# Patient Record
Sex: MALE | Race: WHITE | Employment: FULL TIME | URBAN - METROPOLITAN AREA
[De-identification: names, ages, dates, MRNs, and addresses within clinical notes are randomized per-mention and may not be internally consistent; named-entity substitution may affect disease eponyms.]

---

## 2018-01-18 ENCOUNTER — APPOINTMENT (OUTPATIENT)
Dept: CT IMAGING | Age: 68
DRG: 392 | End: 2018-01-18
Attending: PHYSICIAN ASSISTANT
Payer: MEDICARE

## 2018-01-18 ENCOUNTER — HOSPITAL ENCOUNTER (INPATIENT)
Age: 68
LOS: 4 days | Discharge: HOME | DRG: 392 | End: 2018-01-22
Attending: SURGERY | Admitting: SURGERY
Payer: MEDICARE

## 2018-01-18 DIAGNOSIS — K57.92 ACUTE DIVERTICULITIS: Primary | ICD-10-CM

## 2018-01-18 LAB
ANION GAP SERPL CALC-SCNC: 7 MMOL/L (ref 5–15)
BASOPHILS # BLD: 0 K/UL (ref 0–0.1)
BASOPHILS NFR BLD: 0 % (ref 0–1)
BUN SERPL-MCNC: 15 MG/DL (ref 6–20)
BUN/CREAT SERPL: 17 (ref 12–20)
CALCIUM SERPL-MCNC: 9.7 MG/DL (ref 8.5–10.1)
CHLORIDE SERPL-SCNC: 100 MMOL/L (ref 97–108)
CO2 SERPL-SCNC: 32 MMOL/L (ref 21–32)
CREAT SERPL-MCNC: 0.89 MG/DL (ref 0.7–1.3)
EOSINOPHIL # BLD: 0.1 K/UL (ref 0–0.4)
EOSINOPHIL NFR BLD: 1 % (ref 0–7)
ERYTHROCYTE [DISTWIDTH] IN BLOOD BY AUTOMATED COUNT: 13.1 % (ref 11.5–14.5)
GLUCOSE SERPL-MCNC: 79 MG/DL (ref 65–100)
HCT VFR BLD AUTO: 44.2 % (ref 36.6–50.3)
HGB BLD-MCNC: 15.4 G/DL (ref 12.1–17)
LYMPHOCYTES # BLD: 1.6 K/UL (ref 0.8–3.5)
LYMPHOCYTES NFR BLD: 17 % (ref 12–49)
MAGNESIUM SERPL-MCNC: 2.1 MG/DL (ref 1.6–2.4)
MCH RBC QN AUTO: 30.7 PG (ref 26–34)
MCHC RBC AUTO-ENTMCNC: 34.8 G/DL (ref 30–36.5)
MCV RBC AUTO: 88.2 FL (ref 80–99)
MONOCYTES # BLD: 0.9 K/UL (ref 0–1)
MONOCYTES NFR BLD: 10 % (ref 5–13)
NEUTS SEG # BLD: 6.7 K/UL (ref 1.8–8)
NEUTS SEG NFR BLD: 72 % (ref 32–75)
PLATELET # BLD AUTO: 263 K/UL (ref 150–400)
POTASSIUM SERPL-SCNC: 4 MMOL/L (ref 3.5–5.1)
RBC # BLD AUTO: 5.01 M/UL (ref 4.1–5.7)
SODIUM SERPL-SCNC: 139 MMOL/L (ref 136–145)
WBC # BLD AUTO: 9.3 K/UL (ref 4.1–11.1)

## 2018-01-18 PROCEDURE — 85025 COMPLETE CBC W/AUTO DIFF WBC: CPT | Performed by: PHYSICIAN ASSISTANT

## 2018-01-18 PROCEDURE — 74011250637 HC RX REV CODE- 250/637: Performed by: PHYSICIAN ASSISTANT

## 2018-01-18 PROCEDURE — 74177 CT ABD & PELVIS W/CONTRAST: CPT

## 2018-01-18 PROCEDURE — 74011636320 HC RX REV CODE- 636/320: Performed by: RADIOLOGY

## 2018-01-18 PROCEDURE — 65270000029 HC RM PRIVATE

## 2018-01-18 PROCEDURE — 74011000258 HC RX REV CODE- 258: Performed by: SURGERY

## 2018-01-18 PROCEDURE — 83735 ASSAY OF MAGNESIUM: CPT | Performed by: PHYSICIAN ASSISTANT

## 2018-01-18 PROCEDURE — 74011250636 HC RX REV CODE- 250/636: Performed by: SURGERY

## 2018-01-18 PROCEDURE — 36415 COLL VENOUS BLD VENIPUNCTURE: CPT | Performed by: PHYSICIAN ASSISTANT

## 2018-01-18 PROCEDURE — 74011250636 HC RX REV CODE- 250/636: Performed by: PHYSICIAN ASSISTANT

## 2018-01-18 PROCEDURE — 80048 BASIC METABOLIC PNL TOTAL CA: CPT | Performed by: PHYSICIAN ASSISTANT

## 2018-01-18 RX ORDER — ENOXAPARIN SODIUM 100 MG/ML
40 INJECTION SUBCUTANEOUS EVERY 24 HOURS
Status: DISCONTINUED | OUTPATIENT
Start: 2018-01-18 | End: 2018-01-22 | Stop reason: HOSPADM

## 2018-01-18 RX ORDER — SODIUM CHLORIDE 0.9 % (FLUSH) 0.9 %
5-10 SYRINGE (ML) INJECTION AS NEEDED
Status: DISCONTINUED | OUTPATIENT
Start: 2018-01-18 | End: 2018-01-22 | Stop reason: HOSPADM

## 2018-01-18 RX ORDER — HYDROMORPHONE HYDROCHLORIDE 2 MG/ML
0.5 INJECTION, SOLUTION INTRAMUSCULAR; INTRAVENOUS; SUBCUTANEOUS
Status: DISCONTINUED | OUTPATIENT
Start: 2018-01-18 | End: 2018-01-20

## 2018-01-18 RX ORDER — NALOXONE HYDROCHLORIDE 0.4 MG/ML
0.4 INJECTION, SOLUTION INTRAMUSCULAR; INTRAVENOUS; SUBCUTANEOUS AS NEEDED
Status: DISCONTINUED | OUTPATIENT
Start: 2018-01-18 | End: 2018-01-22 | Stop reason: HOSPADM

## 2018-01-18 RX ORDER — DIPHENHYDRAMINE HYDROCHLORIDE 50 MG/ML
12.5 INJECTION, SOLUTION INTRAMUSCULAR; INTRAVENOUS
Status: DISCONTINUED | OUTPATIENT
Start: 2018-01-18 | End: 2018-01-22 | Stop reason: HOSPADM

## 2018-01-18 RX ORDER — DEXTROSE, SODIUM CHLORIDE, AND POTASSIUM CHLORIDE 5; .45; .15 G/100ML; G/100ML; G/100ML
75 INJECTION INTRAVENOUS CONTINUOUS
Status: DISCONTINUED | OUTPATIENT
Start: 2018-01-18 | End: 2018-01-22

## 2018-01-18 RX ORDER — ONDANSETRON 2 MG/ML
4 INJECTION INTRAMUSCULAR; INTRAVENOUS
Status: DISCONTINUED | OUTPATIENT
Start: 2018-01-18 | End: 2018-01-22 | Stop reason: HOSPADM

## 2018-01-18 RX ORDER — SODIUM CHLORIDE 0.9 % (FLUSH) 0.9 %
5-10 SYRINGE (ML) INJECTION EVERY 8 HOURS
Status: DISCONTINUED | OUTPATIENT
Start: 2018-01-18 | End: 2018-01-22 | Stop reason: HOSPADM

## 2018-01-18 RX ORDER — ALPRAZOLAM 0.5 MG/1
0.5 TABLET ORAL
Status: DISCONTINUED | OUTPATIENT
Start: 2018-01-18 | End: 2018-01-22 | Stop reason: HOSPADM

## 2018-01-18 RX ADMIN — DEXTROSE MONOHYDRATE, SODIUM CHLORIDE, AND POTASSIUM CHLORIDE 125 ML/HR: 50; 4.5; 1.49 INJECTION, SOLUTION INTRAVENOUS at 12:31

## 2018-01-18 RX ADMIN — HYDROMORPHONE HYDROCHLORIDE 0.5 MG: 2 INJECTION INTRAMUSCULAR; INTRAVENOUS; SUBCUTANEOUS at 18:35

## 2018-01-18 RX ADMIN — Medication 10 ML: at 12:31

## 2018-01-18 RX ADMIN — PIPERACILLIN SODIUM AND TAZOBACTAM SODIUM 3.38 G: 3; .375 INJECTION, POWDER, LYOPHILIZED, FOR SOLUTION INTRAVENOUS at 12:30

## 2018-01-18 RX ADMIN — HYDROMORPHONE HYDROCHLORIDE 0.5 MG: 2 INJECTION INTRAMUSCULAR; INTRAVENOUS; SUBCUTANEOUS at 15:28

## 2018-01-18 RX ADMIN — HYDROMORPHONE HYDROCHLORIDE 0.5 MG: 2 INJECTION INTRAMUSCULAR; INTRAVENOUS; SUBCUTANEOUS at 21:06

## 2018-01-18 RX ADMIN — ALPRAZOLAM 0.5 MG: 0.5 TABLET ORAL at 21:06

## 2018-01-18 RX ADMIN — IOPAMIDOL 94 ML: 755 INJECTION, SOLUTION INTRAVENOUS at 14:10

## 2018-01-18 RX ADMIN — PIPERACILLIN SODIUM AND TAZOBACTAM SODIUM 3.38 G: 3; .375 INJECTION, POWDER, LYOPHILIZED, FOR SOLUTION INTRAVENOUS at 21:05

## 2018-01-18 RX ADMIN — HYDROMORPHONE HYDROCHLORIDE 0.5 MG: 2 INJECTION INTRAMUSCULAR; INTRAVENOUS; SUBCUTANEOUS at 12:31

## 2018-01-18 RX ADMIN — ENOXAPARIN SODIUM 40 MG: 40 INJECTION SUBCUTANEOUS at 16:27

## 2018-01-18 NOTE — IP AVS SNAPSHOT
[address] 1007 [facility] 
[phone] Patient: Luis M Hutchinson MRN: [id number] PEREZ:[id number] About your hospitalization You were admitted on:  January 18, 2018 You last received care in the:  [facility]  MED SURG 2 You were discharged on:  January 22, 2018 Why you were hospitalized Your primary diagnosis was:  Not on File Your diagnoses also included:  Acute Diverticulitis Follow-up Information Follow up With Details Comments Contact Info Ahsan Orozco MD Schedule an appointment as soon as possible for a visit  [address] 1007 [facility] 
[phone] Marry Isaacs MD Schedule an appointment as soon as possible for a visit in 4 weeks to discuss colonoscopy  [facility] [place] [address] [facility] [address] 
[phone] Carolynn Borja MD Go on 1/26/2018 Hospital follow-up appointment at 1:00PM [address] 
[address] 1007 [facility] 
[phone] Discharge Orders None A check keyon indicates which time of day the medication should be taken. My Medications START taking these medications Instructions Each Dose to Equal  
 Morning Noon Evening Bedtime  
 levoFLOXacin 750 mg tablet Commonly known as:  Kirstie Hamilton Your last dose was: Your next dose is: Take 1 Tab by mouth daily. 750 mg  
    
   
   
   
  
 metroNIDAZOLE 500 mg tablet Commonly known as:  FLAGYL Your last dose was: Your next dose is: Take 1 Tab by mouth three (3) times daily. 500 mg  
    
   
   
   
  
 traMADol 50 mg tablet Commonly known as:  ULTRAM  
   
Your last dose was: Your next dose is: Take 1 Tab by mouth every six (6) hours as needed. Max Daily Amount: 200 mg. Indications: Pain 50 mg CONTINUE taking these medications Instructions Each Dose to Equal  
 Morning Noon Evening Bedtime ALPRAZolam 0.5 mg tablet Commonly known as:  Claudia Carcamo Your last dose was: Your next dose is: Take 1 Tab by mouth every eight (8) hours as needed for Anxiety. Max Daily Amount: 1.5 mg.  
 0.5 mg  
    
   
   
   
  
  
STOP taking these medications HYDROcodone-acetaminophen 5-325 mg per tablet Commonly known as:  Debbie Apa Where to Get Your Medications Information on where to get these meds will be given to you by the nurse or doctor. ! Ask your nurse or doctor about these medications  
  levoFLOXacin 750 mg tablet  
 metroNIDAZOLE 500 mg tablet  
 traMADol 50 mg tablet Discharge Instructions Patient Discharge Instructions Moise Rosales / [id number] : 1950 Admitted 2018 Discharged: 2018 Take Home Medications · It is important that you take the medication exactly as they are prescribed. · Keep your medication in the bottles provided by the pharmacist and keep a list of the medication names, dosages, and times to be taken in your wallet. · Do not take other medications without consulting your doctor. · Do not drive, drink alcohol, or operate machinery when taking sedating pain medication. · Take colace daily while on pain medication to help prevent constipation. You may take over the counter laxatives such as Dulcolax or Miralax as needed for constipation What to do at [facility] Recommended diet: Low residue diet Recommended activity: As tolerated Follow up with Dr. Sumanth Concepcion in 2 weeks. Call [facility] to make an appointment. Follow up with Dr. Jae Bell (gastroenterology) for colonoscopy. Call Dr. Sumanth Concepcion or go to the ER if you develop worsening pain, fever, vomiting, or any other symptoms that concern you. Unresulted Labs-Please follow up with your PCP about these lab tests Order Current Status MAGNESIUM Collected (01/22/18 0400) Providers Seen During Your Hospitalization Provider Specialty Primary office phone Yan Sheffield MD General Surgery [phone] Your Primary Care Physician (PCP) Primary Care Physician Office Phone Office Fax Alicia Walls, [address]oni Clay [facility] [phone] You are allergic to the following No active allergies Recent Documentation Height Weight BMI Smoking Status 1.753 m 66.7 kg 21.71 kg/m2 Never Smoker Emergency Contacts Name Discharge Info Relation Home Work Mobile Nick Ramirez DISCHARGE CAREGIVER [3] Spouse [3] [phone] Patient Belongings The following personal items are in your possession at time of discharge: 
  Dental Appliances: None         Home Medications: None   Jewelry: With patient  Clothing: At bedside    Other Valuables: Cell Phone, At bedside Please provide this summary of care documentation to your next provider. Signatures-by signing, you are acknowledging that this After Visit Summary has been reviewed with you and you have received a copy. Patient Signature:  ____________________________________________________________ Date:  ____________________________________________________________  
  
Mamta Early Provider Signature:  ____________________________________________________________ Date:  ____________________________________________________________

## 2018-01-18 NOTE — PROGRESS NOTES
Met with pt for d/c planning. He lives with his wife in [place] and works full time at [address] [place] [address] in . Pt reported that he has never had HH, and he does not own any DME. Pt has Rx coverage. Pharmacy is [facility]. No discharge needs identified. CM will follow and assist as needed. Juan Lang LCSW    Care Management Interventions  PCP Verified by CM:  Yes (Dr. Breanna Awad)  Discharge Durable Medical Equipment: No  Physical Therapy Consult: No  Occupational Therapy Consult: No  Speech Therapy Consult: No  Current Support Network: Lives with Spouse  Confirm Follow Up Transport: Family  Plan discussed with Pt/Family/Caregiver: Yes  Discharge Location  Discharge Placement: Home

## 2018-01-18 NOTE — H&P
Surgery History and Physical    Subjective: Leila Cedeno is a 79 y.o. male who was direct admitted to the hospital from our office for acute diverticulitis. Pt states he developed abdominal pain in mid-December. He was seen by his PCP and was given course of Cipro/Flagyl without improvement. He returned to his PCP earlier this week and CT was obtained showing sigmoid diverticulitis, prompting referral to Dr. Robert Yost. Pt reports increased pain and nausea with PO intake. He has had some constipation and chills. He denies fever, hematochezia or melena. Last colonoscopy 9 years ago. Past Medical History:   Diagnosis Date    Hypertension      No past surgical history on file. No family history on file.   Social History     Social History    Marital status:      Spouse name: N/A    Number of children: N/A    Years of education: N/A     Social History Main Topics    Smoking status: Never Smoker    Smokeless tobacco: Not on file    Alcohol use Not on file    Drug use: Not on file    Sexual activity: Not on file     Other Topics Concern    Not on file     Social History Narrative      Current Facility-Administered Medications   Medication Dose Route Frequency    sodium chloride (NS) flush 5-10 mL  5-10 mL IntraVENous Q8H    sodium chloride (NS) flush 5-10 mL  5-10 mL IntraVENous PRN    HYDROmorphone (DILAUDID) injection 0.5 mg  0.5 mg IntraVENous Q3H PRN    naloxone (NARCAN) injection 0.4 mg  0.4 mg IntraVENous PRN    diphenhydrAMINE (BENADRYL) injection 12.5 mg  12.5 mg IntraVENous Q4H PRN    ondansetron (ZOFRAN) injection 4 mg  4 mg IntraVENous Q4H PRN    dextrose 5% - 0.45% NaCl with KCl 20 mEq/L infusion  125 mL/hr IntraVENous CONTINUOUS    piperacillin-tazobactam (ZOSYN) 3.375 g in 0.9% sodium chloride (MBP/ADV) 100 mL  3.375 g IntraVENous Q8H    diatrizoate meglumine-d.sodium (MD-GASTROVIEW,GASTROGRAFIN) 66-10 % contrast solution 30 mL  30 mL Oral RAD ONCE      No Known Allergies    Review of Systems:REVIEW OF SYSTEMS:     []     Unable to obtain  ROS due to  []    mental status change  []    sedated   []    intubated   []    Total of 12 systems reviewed as follows:    Constitutional: neg for fevers, weight loss, malaise, + chills  Eyes: negative for blurry vision  Ears, nose, mouth, throat, and face: negative for sore throat  Respiratory: negative for SOB  Cardiovascular: negative for CP  Gastrointestinal: + for nausea and abdominal pain,  constipation, negative for vomiting, diarrhea, melena, hematochezia  Genitourinary: negative for dysuria  Integument/breast: neg for skin rash  Hematologic/lymphatic: neg for bruising  Musculoskeletal: negative for muscle aches  Neurological: no dizziness or h/a    Objective:      Patient Vitals for the past 8 hrs:   BP Temp Pulse Resp SpO2   18 1139 123/77 97.8 °F (36.6 °C) (!) 53 16 96 %       Temp (24hrs), Av.8 °F (36.6 °C), Min:97.8 °F (36.6 °C), Max:97.8 °F (36.6 °C)      Physical Exam:  General:  Alert, cooperative, no distress, appears stated age. Eyes:  Conjunctivae/corneas clear. Nose: Nares normal. Septum midline   Mouth/Throat: Lips, mucosa, and tongue normal.    Neck: Supple, symmetrical, trachea midline   Lungs:   Clear to auscultation bilaterally. Heart:  Regular rate and rhythm   Abdomen:   Soft, non-distended, suprapubic and LLQ tenderness, no peritonitis    Extremities: Extremities normal, atraumatic, no cyanosis or edema. Skin: Skin color, texture, turgor normal. No rashes or lesions   Neuro: Alert, oriented, speech clear     Labs: No results for input(s): WBC, HGB, HCT, PLT, HGBEXT, HCTEXT, PLTEXT in the last 72 hours. No results for input(s): NA, K, CL, CO2, GLU, BUN, CREA, CA, MG, PHOS, ALB, TBIL, TBILI, SGOT, ALT in the last 72 hours. No results for input(s): INR in the last 72 hours.     No lab exists for component: INREXT      Assessment and Plan:     Acute diverticulitis    NPO, IVF, Zosyn, labs, obtain CT abd/pelvis. Further plan to follow review of imaging.        Signed By: DRU Serna     January 18, 2018

## 2018-01-18 NOTE — PROGRESS NOTES
1135: Notified Olga GONSALES that pt has arrived to floor. PA stated to keep pt NPO for now. PA to place admission orders. 1231: Notified Radiology contrast is being started.

## 2018-01-18 NOTE — PROGRESS NOTES
Primary Nurse Ebenezer Rodriguez RN and Nicolas Perry RN performed a dual skin assessment on this patient. No impairment noted. Leandro score is 22.

## 2018-01-19 LAB
ANION GAP SERPL CALC-SCNC: 8 MMOL/L (ref 5–15)
BASOPHILS # BLD: 0 K/UL (ref 0–0.1)
BASOPHILS NFR BLD: 1 % (ref 0–1)
BUN SERPL-MCNC: 12 MG/DL (ref 6–20)
BUN/CREAT SERPL: 13 (ref 12–20)
CALCIUM SERPL-MCNC: 9.3 MG/DL (ref 8.5–10.1)
CHLORIDE SERPL-SCNC: 101 MMOL/L (ref 97–108)
CO2 SERPL-SCNC: 30 MMOL/L (ref 21–32)
CREAT SERPL-MCNC: 0.89 MG/DL (ref 0.7–1.3)
EOSINOPHIL # BLD: 0.2 K/UL (ref 0–0.4)
EOSINOPHIL NFR BLD: 2 % (ref 0–7)
ERYTHROCYTE [DISTWIDTH] IN BLOOD BY AUTOMATED COUNT: 13 % (ref 11.5–14.5)
GLUCOSE SERPL-MCNC: 95 MG/DL (ref 65–100)
HCT VFR BLD AUTO: 43.2 % (ref 36.6–50.3)
HGB BLD-MCNC: 14.8 G/DL (ref 12.1–17)
LYMPHOCYTES # BLD: 1.7 K/UL (ref 0.8–3.5)
LYMPHOCYTES NFR BLD: 26 % (ref 12–49)
MAGNESIUM SERPL-MCNC: 2 MG/DL (ref 1.6–2.4)
MCH RBC QN AUTO: 30.5 PG (ref 26–34)
MCHC RBC AUTO-ENTMCNC: 34.3 G/DL (ref 30–36.5)
MCV RBC AUTO: 89.1 FL (ref 80–99)
MONOCYTES # BLD: 0.8 K/UL (ref 0–1)
MONOCYTES NFR BLD: 13 % (ref 5–13)
NEUTS SEG # BLD: 3.9 K/UL (ref 1.8–8)
NEUTS SEG NFR BLD: 58 % (ref 32–75)
PLATELET # BLD AUTO: 237 K/UL (ref 150–400)
POTASSIUM SERPL-SCNC: 3.8 MMOL/L (ref 3.5–5.1)
RBC # BLD AUTO: 4.85 M/UL (ref 4.1–5.7)
SODIUM SERPL-SCNC: 139 MMOL/L (ref 136–145)
WBC # BLD AUTO: 6.6 K/UL (ref 4.1–11.1)

## 2018-01-19 PROCEDURE — 83735 ASSAY OF MAGNESIUM: CPT | Performed by: PHYSICIAN ASSISTANT

## 2018-01-19 PROCEDURE — 65270000029 HC RM PRIVATE

## 2018-01-19 PROCEDURE — 74011250636 HC RX REV CODE- 250/636: Performed by: PHYSICIAN ASSISTANT

## 2018-01-19 PROCEDURE — 74011250637 HC RX REV CODE- 250/637: Performed by: PHYSICIAN ASSISTANT

## 2018-01-19 PROCEDURE — 80048 BASIC METABOLIC PNL TOTAL CA: CPT | Performed by: PHYSICIAN ASSISTANT

## 2018-01-19 PROCEDURE — 74011000258 HC RX REV CODE- 258: Performed by: SURGERY

## 2018-01-19 PROCEDURE — 74011250636 HC RX REV CODE- 250/636: Performed by: SURGERY

## 2018-01-19 PROCEDURE — 85025 COMPLETE CBC W/AUTO DIFF WBC: CPT | Performed by: PHYSICIAN ASSISTANT

## 2018-01-19 PROCEDURE — 74011000250 HC RX REV CODE- 250: Performed by: PHYSICIAN ASSISTANT

## 2018-01-19 PROCEDURE — 36415 COLL VENOUS BLD VENIPUNCTURE: CPT | Performed by: PHYSICIAN ASSISTANT

## 2018-01-19 RX ORDER — HYDROCODONE BITARTRATE AND ACETAMINOPHEN 10; 325 MG/1; MG/1
1 TABLET ORAL
Status: DISCONTINUED | OUTPATIENT
Start: 2018-01-19 | End: 2018-01-20

## 2018-01-19 RX ORDER — LEVOFLOXACIN 750 MG/1
750 TABLET ORAL DAILY
Qty: 12 TAB | Refills: 0 | Status: ON HOLD | OUTPATIENT
Start: 2018-01-19 | End: 2018-02-23

## 2018-01-19 RX ORDER — DOCUSATE SODIUM 100 MG/1
100 CAPSULE, LIQUID FILLED ORAL DAILY
Status: DISCONTINUED | OUTPATIENT
Start: 2018-01-19 | End: 2018-01-22 | Stop reason: HOSPADM

## 2018-01-19 RX ORDER — METRONIDAZOLE 500 MG/100ML
500 INJECTION, SOLUTION INTRAVENOUS EVERY 12 HOURS
Status: DISCONTINUED | OUTPATIENT
Start: 2018-01-19 | End: 2018-01-22 | Stop reason: HOSPADM

## 2018-01-19 RX ORDER — HYDROCODONE BITARTRATE AND ACETAMINOPHEN 5; 325 MG/1; MG/1
1 TABLET ORAL
Qty: 20 TAB | Refills: 0 | Status: SHIPPED | OUTPATIENT
Start: 2018-01-19 | End: 2018-01-22

## 2018-01-19 RX ORDER — METRONIDAZOLE 500 MG/1
500 TABLET ORAL 3 TIMES DAILY
Qty: 36 TAB | Refills: 0 | Status: ON HOLD | OUTPATIENT
Start: 2018-01-19 | End: 2018-02-23

## 2018-01-19 RX ORDER — LEVOFLOXACIN 5 MG/ML
750 INJECTION, SOLUTION INTRAVENOUS DAILY
Status: DISCONTINUED | OUTPATIENT
Start: 2018-01-19 | End: 2018-01-22 | Stop reason: HOSPADM

## 2018-01-19 RX ORDER — KETOROLAC TROMETHAMINE 30 MG/ML
15 INJECTION, SOLUTION INTRAMUSCULAR; INTRAVENOUS EVERY 6 HOURS
Status: COMPLETED | OUTPATIENT
Start: 2018-01-19 | End: 2018-01-22

## 2018-01-19 RX ADMIN — METRONIDAZOLE 500 MG: 500 INJECTION, SOLUTION INTRAVENOUS at 23:09

## 2018-01-19 RX ADMIN — HYDROMORPHONE HYDROCHLORIDE 0.5 MG: 2 INJECTION INTRAMUSCULAR; INTRAVENOUS; SUBCUTANEOUS at 07:09

## 2018-01-19 RX ADMIN — Medication 10 ML: at 22:00

## 2018-01-19 RX ADMIN — PIPERACILLIN SODIUM AND TAZOBACTAM SODIUM 3.38 G: 3; .375 INJECTION, POWDER, LYOPHILIZED, FOR SOLUTION INTRAVENOUS at 04:26

## 2018-01-19 RX ADMIN — LEVOFLOXACIN 750 MG: 5 INJECTION, SOLUTION INTRAVENOUS at 11:20

## 2018-01-19 RX ADMIN — ENOXAPARIN SODIUM 40 MG: 40 INJECTION SUBCUTANEOUS at 17:01

## 2018-01-19 RX ADMIN — METRONIDAZOLE 500 MG: 500 INJECTION, SOLUTION INTRAVENOUS at 11:20

## 2018-01-19 RX ADMIN — HYDROCODONE BITARTRATE AND ACETAMINOPHEN 1 TABLET: 10; 325 TABLET ORAL at 09:18

## 2018-01-19 RX ADMIN — HYDROCODONE BITARTRATE AND ACETAMINOPHEN 1 TABLET: 10; 325 TABLET ORAL at 14:56

## 2018-01-19 RX ADMIN — ALPRAZOLAM 0.5 MG: 0.5 TABLET ORAL at 21:06

## 2018-01-19 RX ADMIN — HYDROMORPHONE HYDROCHLORIDE 0.5 MG: 2 INJECTION INTRAMUSCULAR; INTRAVENOUS; SUBCUTANEOUS at 04:21

## 2018-01-19 RX ADMIN — DOCUSATE SODIUM 100 MG: 100 CAPSULE, LIQUID FILLED ORAL at 11:21

## 2018-01-19 RX ADMIN — HYDROCODONE BITARTRATE AND ACETAMINOPHEN 1 TABLET: 10; 325 TABLET ORAL at 21:06

## 2018-01-19 RX ADMIN — KETOROLAC TROMETHAMINE 15 MG: 30 INJECTION, SOLUTION INTRAMUSCULAR at 11:20

## 2018-01-19 RX ADMIN — HYDROMORPHONE HYDROCHLORIDE 0.5 MG: 2 INJECTION INTRAMUSCULAR; INTRAVENOUS; SUBCUTANEOUS at 00:55

## 2018-01-19 RX ADMIN — KETOROLAC TROMETHAMINE 15 MG: 30 INJECTION, SOLUTION INTRAMUSCULAR at 23:09

## 2018-01-19 RX ADMIN — KETOROLAC TROMETHAMINE 15 MG: 30 INJECTION, SOLUTION INTRAMUSCULAR at 17:01

## 2018-01-19 NOTE — ROUTINE PROCESS
Bedside and Verbal shift change report given to eRmy Bishop 69 (oncoming nurse) by Olivia OLIVAS (offgoing nurse). Report included the following information SBAR, Kardex, Intake/Output and Recent Results.

## 2018-01-19 NOTE — DISCHARGE INSTRUCTIONS
Patient Discharge Instructions    Mick Ramon / [id number] : 1950    Admitted 2018 Discharged: 2018     Take Home Medications       · It is important that you take the medication exactly as they are prescribed. · Keep your medication in the bottles provided by the pharmacist and keep a list of the medication names, dosages, and times to be taken in your wallet. · Do not take other medications without consulting your doctor. · Do not drive, drink alcohol, or operate machinery when taking sedating pain medication. · Take colace daily while on pain medication to help prevent constipation. You may take over the counter laxatives such as Dulcolax or Miralax as needed for constipation      What to do at Home    Recommended diet: Low residue diet     Recommended activity: As tolerated    Follow up with Dr. Lawler in 2 weeks. Call [facility] to make an appointment. Follow up with Dr. Katharina Hidalgo (gastroenterology) for colonoscopy. Call Dr. Lawler or go to the ER if you develop worsening pain, fever, vomiting, or any other symptoms that concern you.

## 2018-01-19 NOTE — PROGRESS NOTES
General Surgery Daily Progress Note    Patient: Angela Recinos MRN: [id number]  SSN: [id number]    YOB: 1950  Age: 79 y.o. Sex: male      Admit Date: 1/18/2018    Subjective:   Pain moderately improved. Denies N/V. No fever. Current Facility-Administered Medications   Medication Dose Route Frequency    ketorolac (TORADOL) injection 15 mg  15 mg IntraVENous Q6H    HYDROcodone-acetaminophen (NORCO)  mg tablet 1 Tab  1 Tab Oral Q4H PRN    levoFLOXacin (LEVAQUIN) 750 mg in D5W IVPB  750 mg IntraVENous Q24H    metroNIDAZOLE (FLAGYL) IVPB premix 500 mg  500 mg IntraVENous Q8H    docusate sodium (COLACE) capsule 100 mg  100 mg Oral DAILY    sodium chloride (NS) flush 5-10 mL  5-10 mL IntraVENous Q8H    sodium chloride (NS) flush 5-10 mL  5-10 mL IntraVENous PRN    HYDROmorphone (DILAUDID) injection 0.5 mg  0.5 mg IntraVENous Q3H PRN    naloxone (NARCAN) injection 0.4 mg  0.4 mg IntraVENous PRN    diphenhydrAMINE (BENADRYL) injection 12.5 mg  12.5 mg IntraVENous Q4H PRN    ondansetron (ZOFRAN) injection 4 mg  4 mg IntraVENous Q4H PRN    dextrose 5% - 0.45% NaCl with KCl 20 mEq/L infusion  75 mL/hr IntraVENous CONTINUOUS    enoxaparin (LOVENOX) injection 40 mg  40 mg SubCUTAneous Q24H    ALPRAZolam (XANAX) tablet 0.5 mg  0.5 mg Oral Q8H PRN        Objective:      01/17 1901 - 01/19 0700  In: 120 [P.O.:120]  Out: -   Patient Vitals for the past 8 hrs:   BP Temp Pulse Resp SpO2   01/19/18 0721 113/77 97.9 °F (36.6 °C) (!) 59 17 99 %   01/19/18 0413 145/81 99.1 °F (37.3 °C) 63 16 96 %       Physical Exam:  General: Alert, cooperative, NAD  Lungs: Unlabored  Heart:  Regular rate and  rhythm  Abdomen: Soft, mild LLQ tenderness, non-distended. + bowel sounds.    Extremities: Warm, moves all, no edema  Skin:  Warm and dry, no rash    Labs: Recent Labs      01/19/18   0426   WBC  6.6   HGB  14.8   HCT  43.2   PLT  237     Recent Labs      01/19/18   0426   NA  139   K  3.8   CL  101   CO2 30   GLU  95   BUN  12   CREA  0.89   CA  9.3   MG  2.0       Assessment / Plan:   · Acute sigmoid diverticulitis  · Advance diet  · Will switch to Levaquin/Flagyl IV today  · Decrease IVF  · Ambulate  · Nutritionist consult for low residue diet education. · If doing well tomorrow, plan to transition to low residue diet and observe on PO ABX for 24 hours with tentative discharge Sunday. · Will need outpatient follow up with GI for colonoscopy in 6-8 weeks.

## 2018-01-19 NOTE — PROGRESS NOTES
[address]   Automatic Dosing Conversion for Metronidazole (Flagyl©)   Metronidazole dosed at 500mg every 12 hours obtains peak serum levels of approximately 23ug/ml (more than 10 times the JOSHUA for B. fragilis [1-2ug/ml]) and trough levels of about 7 ug/ml. Based on these pharmacokinetic and pharmacodynamic properties, dosing of metronidazole every 12 or every 24 hours is appropriate for the treatment of anaerobic infections. Pharmacy will automatically change the dose of metronidazole to 500 mg every 12 hours for all indications except for the following:   · C. difficile infection   · CNS infections   · Non-anaerobic infections (giardiasis, trichomonas, H pylori, etc)      Metronidazole 500 mg q8h changed to 500 mg 12h    Thank you,    Harini Garcia.  Patrick Sears [facility]

## 2018-01-19 NOTE — PROGRESS NOTES
Problem: Falls - Risk of  Goal: *Absence of Falls  Document Sachin Fall Risk and appropriate interventions in the flowsheet.    Outcome: Progressing Towards Goal  Fall Risk Interventions:            Medication Interventions: Evaluate medications/consider consulting pharmacy, Patient to call before getting OOB, Teach patient to arise slowly

## 2018-01-19 NOTE — ROUTINE PROCESS
Bedside and Verbal shift change report given to Jostin Sierra RN (oncoming nurse) by Arielle Davis RN (offgoing nurse). Report included the following information SBAR, Kardex, Intake/Output, MAR and Recent Results.

## 2018-01-19 NOTE — PROGRESS NOTES
Nutrition Assessment:    RECOMMENDATIONS/INTERVENTION(S):   Advance diet to GI Lite as able  Monitor PO intakes, wt, GI status- BMs, lipase - 1658. Add Ensure HP vanilla- for lunch    ASSESSMENT:   1/19: 79 yr old male admitted for acute diverticulitis. PMHx: HTN, diverticulosis. Lipase elevated 1658. Consult received for Low Fiber diet education. Handout given and discussed with pt and spouse. Pt states last BM was 12/23. Then pt states he drank a bottle of miralax within a couple days and had diarrhea \"last week. \" Last BM likely closer to 1/15. Pt was constipated between end of December and last week but has had multiple BMs last week. Pt states he's lost 25 lbs over the last year. Claims he weighed 168 lbs a year ago and was down to 157 lbs last month and now weighs 143 lbs. Per chart, pt weighed 157 lbs 2+ yrs ago, and currently weighs 147 lbs. 10 lbs wt loss, unsure time frame. Pt had outburst at spouse about adding comments about wt loss. Spouse stated pt was trying to lose wt, pt disagrees, adamantly. Pt on full liquid diet currently. Advance to GI lite as able per GI. No chew/swallow difficulties. No food allergies. ALT 84, AST 38.      SUBJECTIVE/OBJECTIVE:   Diet Order: Full liquids  % Eaten:  Patient Vitals for the past 168 hrs:   % Diet Eaten   01/19/18 1507 100 %   01/19/18 0900 100 %     Pertinent Medications: [x] Reviewed    Past Medical History:   Diagnosis Date    Hypertension         Chemistries:  Lab Results   Component Value Date/Time    Sodium 139 01/19/2018 04:26 AM    Potassium 3.8 01/19/2018 04:26 AM    Chloride 101 01/19/2018 04:26 AM    CO2 30 01/19/2018 04:26 AM    Anion gap 8 01/19/2018 04:26 AM    Glucose 95 01/19/2018 04:26 AM    BUN 12 01/19/2018 04:26 AM    Creatinine 0.89 01/19/2018 04:26 AM    BUN/Creatinine ratio 13 01/19/2018 04:26 AM    GFR est AA >60 01/19/2018 04:26 AM    GFR est non-AA >60 01/19/2018 04:26 AM    Calcium 9.3 01/19/2018 04:26 AM    AST (SGOT) 38 11/18/2015 07:13 PM    Alk. phosphatase 42 11/18/2015 07:13 PM    Protein, total 6.9 11/18/2015 07:13 PM    Albumin 3.5 11/18/2015 07:13 PM    Globulin 3.4 11/18/2015 07:13 PM    A-G Ratio 1.0 11/18/2015 07:13 PM    ALT (SGPT) 84 11/18/2015 07:13 PM      Anthropometrics: Height: 5' 9\" (175.3 cm) Weight: 66.7 kg (147 lb)    IBW (%IBW):   ( ) UBW (%UBW):   (  %)    BMI: Body mass index is 21.71 kg/(m^2). This BMI is indicative of:     [x] Underweight for age    [] Normal    [] Overweight    []  Obesity    []  Extreme Obesity (BMI>40)    Estimated Nutrition Needs (Based on): 1862 Kcals/day (MXR(5775G4.6)) , 67 g (73g/day(1.0-1.1g/kg)) Protein  Carbohydrate: At Least 130 g/day  Fluids: 1850 mL/day    Last BM: 1/15? []Active     []Hyperactive  []Hypoactive       [] Absent   BS  Skin:    [x] Intact   [] Incision  [] Breakdown   [] DTI   [] Tears/Excoriation/Abrasion  []Edema [] Other:    Wt Readings from Last 30 Encounters:   01/19/18 66.7 kg (147 lb)   11/18/15 71.2 kg (157 lb)   07/26/15 69.9 kg (154 lb)      NUTRITION DIAGNOSES:   Problem:  Inadequate energy intake     Etiology: related to decreased ability to consume sufficient energy     Signs/Symptoms: as evidenced by Poor po intakes PTA, abdominal pain, constipation,       NUTRITION INTERVENTIONS:  Meals/Snacks: General/healthful diet   Supplements: Commercial supplement              GOAL:   Pt will consume >75% of meals w/o pain, tolerate low fiber within 2-4 days    Cultural, Lutheran, or Ethnic Dietary Needs: None     LEARNING NEEDS (Diet, Food/Nutrient-Drug Interaction):    [x] None Identified   [] Identified and Education Provided/Documented   [] Identified and Pt declined/was not appropriate      [x] Interdisciplinary Care Plan Reviewed/Documented    [x] Discharge Needs:    TBD   [] No Nutrition Related Discharge Needs    NUTRITION RISK:   Pt Is At Nutrition Risk  [x]     No Nutrition Risk Identified  []       PT SEEN FOR:    [x]  MD Consult: []Calorie Count []Diabetic Diet Education        [x]Diet Education     []Electrolyte Management     []General Nutrition Management and Supplements     []Management of Tube Feeding     []TPN Recommendations    []  RN Referral:  []MST score >=2     []Enteral/Parenteral Nutrition PTA     []Pregnant: Gestational DM or Multigestation                 [] Pressure Ulcer      []  Low BMI      []  Length of Stay       [] Dysphagia Diet     [] Ventilator      [] Follow-Up        Previous Recommendations:   [] Implemented          [] Not Implemented          [x] Not Applicable    Previous Goal:   [] Met              [] Progressing Towards Goal              [] Not Progressing Towards Goal   [x] Not Applicable              Madison Duron, [address]  Pager: [phone]  Office: [phone]

## 2018-01-20 LAB
ANION GAP SERPL CALC-SCNC: 8 MMOL/L (ref 5–15)
BASOPHILS # BLD: 0 K/UL (ref 0–0.1)
BASOPHILS NFR BLD: 0 % (ref 0–1)
BUN SERPL-MCNC: 11 MG/DL (ref 6–20)
BUN/CREAT SERPL: 14 (ref 12–20)
CALCIUM SERPL-MCNC: 9.3 MG/DL (ref 8.5–10.1)
CHLORIDE SERPL-SCNC: 103 MMOL/L (ref 97–108)
CO2 SERPL-SCNC: 28 MMOL/L (ref 21–32)
CREAT SERPL-MCNC: 0.78 MG/DL (ref 0.7–1.3)
EOSINOPHIL # BLD: 0.2 K/UL (ref 0–0.4)
EOSINOPHIL NFR BLD: 3 % (ref 0–7)
ERYTHROCYTE [DISTWIDTH] IN BLOOD BY AUTOMATED COUNT: 12.9 % (ref 11.5–14.5)
GLUCOSE BLD STRIP.AUTO-MCNC: 101 MG/DL (ref 65–100)
GLUCOSE SERPL-MCNC: 109 MG/DL (ref 65–100)
HCT VFR BLD AUTO: 39.6 % (ref 36.6–50.3)
HGB BLD-MCNC: 14.2 G/DL (ref 12.1–17)
LYMPHOCYTES # BLD: 1.8 K/UL (ref 0.8–3.5)
LYMPHOCYTES NFR BLD: 28 % (ref 12–49)
MAGNESIUM SERPL-MCNC: 2 MG/DL (ref 1.6–2.4)
MCH RBC QN AUTO: 31.2 PG (ref 26–34)
MCHC RBC AUTO-ENTMCNC: 35.9 G/DL (ref 30–36.5)
MCV RBC AUTO: 87 FL (ref 80–99)
MONOCYTES # BLD: 0.6 K/UL (ref 0–1)
MONOCYTES NFR BLD: 10 % (ref 5–13)
NEUTS SEG # BLD: 3.9 K/UL (ref 1.8–8)
NEUTS SEG NFR BLD: 59 % (ref 32–75)
PLATELET # BLD AUTO: 232 K/UL (ref 150–400)
POTASSIUM SERPL-SCNC: 3.8 MMOL/L (ref 3.5–5.1)
RBC # BLD AUTO: 4.55 M/UL (ref 4.1–5.7)
SERVICE CMNT-IMP: ABNORMAL
SODIUM SERPL-SCNC: 139 MMOL/L (ref 136–145)
WBC # BLD AUTO: 6.5 K/UL (ref 4.1–11.1)

## 2018-01-20 PROCEDURE — 82962 GLUCOSE BLOOD TEST: CPT

## 2018-01-20 PROCEDURE — 36415 COLL VENOUS BLD VENIPUNCTURE: CPT | Performed by: PHYSICIAN ASSISTANT

## 2018-01-20 PROCEDURE — 74011250636 HC RX REV CODE- 250/636: Performed by: PHYSICIAN ASSISTANT

## 2018-01-20 PROCEDURE — 83735 ASSAY OF MAGNESIUM: CPT | Performed by: PHYSICIAN ASSISTANT

## 2018-01-20 PROCEDURE — 80048 BASIC METABOLIC PNL TOTAL CA: CPT | Performed by: PHYSICIAN ASSISTANT

## 2018-01-20 PROCEDURE — 74011000250 HC RX REV CODE- 250: Performed by: PHYSICIAN ASSISTANT

## 2018-01-20 PROCEDURE — 74011250637 HC RX REV CODE- 250/637: Performed by: SURGERY

## 2018-01-20 PROCEDURE — 85025 COMPLETE CBC W/AUTO DIFF WBC: CPT | Performed by: PHYSICIAN ASSISTANT

## 2018-01-20 PROCEDURE — 65270000029 HC RM PRIVATE

## 2018-01-20 PROCEDURE — 74011250636 HC RX REV CODE- 250/636: Performed by: SURGERY

## 2018-01-20 PROCEDURE — 74011250637 HC RX REV CODE- 250/637: Performed by: PHYSICIAN ASSISTANT

## 2018-01-20 RX ORDER — HYDROMORPHONE HCL IN 0.9% NACL 15 MG/30ML
PATIENT CONTROLLED ANALGESIA VIAL INTRAVENOUS CONTINUOUS
Status: DISCONTINUED | OUTPATIENT
Start: 2018-01-20 | End: 2018-01-22

## 2018-01-20 RX ORDER — ACETAMINOPHEN 500 MG
1000 TABLET ORAL EVERY 8 HOURS
Status: DISCONTINUED | OUTPATIENT
Start: 2018-01-20 | End: 2018-01-21

## 2018-01-20 RX ORDER — DIAZEPAM 5 MG/1
2.5 TABLET ORAL
Status: DISCONTINUED | OUTPATIENT
Start: 2018-01-20 | End: 2018-01-20

## 2018-01-20 RX ADMIN — DEXTROSE MONOHYDRATE, SODIUM CHLORIDE, AND POTASSIUM CHLORIDE 75 ML/HR: 50; 4.5; 1.49 INJECTION, SOLUTION INTRAVENOUS at 02:47

## 2018-01-20 RX ADMIN — METRONIDAZOLE 500 MG: 500 INJECTION, SOLUTION INTRAVENOUS at 11:55

## 2018-01-20 RX ADMIN — DEXTROSE MONOHYDRATE, SODIUM CHLORIDE, AND POTASSIUM CHLORIDE 75 ML/HR: 50; 4.5; 1.49 INJECTION, SOLUTION INTRAVENOUS at 19:12

## 2018-01-20 RX ADMIN — KETOROLAC TROMETHAMINE 15 MG: 30 INJECTION, SOLUTION INTRAMUSCULAR at 17:29

## 2018-01-20 RX ADMIN — HYDROCODONE BITARTRATE AND ACETAMINOPHEN 1 TABLET: 10; 325 TABLET ORAL at 02:46

## 2018-01-20 RX ADMIN — DOCUSATE SODIUM 100 MG: 100 CAPSULE, LIQUID FILLED ORAL at 09:20

## 2018-01-20 RX ADMIN — KETOROLAC TROMETHAMINE 15 MG: 30 INJECTION, SOLUTION INTRAMUSCULAR at 11:54

## 2018-01-20 RX ADMIN — ALPRAZOLAM 0.5 MG: 0.5 TABLET ORAL at 19:23

## 2018-01-20 RX ADMIN — ACETAMINOPHEN 1000 MG: 500 TABLET ORAL at 19:23

## 2018-01-20 RX ADMIN — HYDROMORPHONE HYDROCHLORIDE 0.5 MG: 2 INJECTION INTRAMUSCULAR; INTRAVENOUS; SUBCUTANEOUS at 17:29

## 2018-01-20 RX ADMIN — ENOXAPARIN SODIUM 40 MG: 40 INJECTION SUBCUTANEOUS at 17:29

## 2018-01-20 RX ADMIN — Medication: at 19:14

## 2018-01-20 RX ADMIN — Medication 10 ML: at 06:59

## 2018-01-20 RX ADMIN — LEVOFLOXACIN 750 MG: 5 INJECTION, SOLUTION INTRAVENOUS at 09:20

## 2018-01-20 RX ADMIN — KETOROLAC TROMETHAMINE 15 MG: 30 INJECTION, SOLUTION INTRAMUSCULAR at 06:58

## 2018-01-20 NOTE — PROGRESS NOTES
4537-Responded to RRT and have paged general surgery. Patient with stable vital signs. Expresses extreme displeasure with his pain management. Complains of lower abdominal pain. Vitals are stable. Sats are 100% on room air. Neurological assessment negative. Patient has received scheduled toradol and one dose of Hydromorphone 0.5mg within the last 1/2 hour. Primary RN reports that patient has consistently reported at least 5/10 pain all day. Anticipate call back from surgery attending on call.  Adam OLIVAS

## 2018-01-20 NOTE — PROGRESS NOTES
Problem: Pain  Goal: *Control of Pain  Outcome: Progressing Towards Goal  Pain is being controlled with current pain medication.

## 2018-01-20 NOTE — PROGRESS NOTES
97 Raz Perry responded to RRT call. Pt screaming out in pain. VSS. He is complaining of some hand numbness bilaterally. NIH scale completed with a score of zero. Dr. Max Fuentes called by bedside RN. Latanya Buck, ICU RN remains at bedside for further monitoring.

## 2018-01-20 NOTE — ROUTINE PROCESS
Bedside and Verbal shift change report given to Suzanne RN (oncoming nurse) by Olivia RN (offgoing nurse). Report included the following information SBAR, Kardex, Intake/Output and Recent Results.

## 2018-01-20 NOTE — PROGRESS NOTES
Bedside and Verbal shift change report given to Miryam Kellogg RN (oncoming nurse) by Hermann Bertrand RN (offgoing nurse). Report included the following information SBAR, Kardex, ED Summary, Intake/Output, MAR and Recent Results.

## 2018-01-20 NOTE — PROGRESS NOTES
Spiritual Care Partner Volunteer visited patient in 26 on 1.19.18.   Documented by:    [id number] Temi Pollock M.Div M.S, Eugenio 601 available at [address] [place] [phone]

## 2018-01-21 LAB — MAGNESIUM SERPL-MCNC: 1.8 MG/DL (ref 1.6–2.4)

## 2018-01-21 PROCEDURE — 74011250637 HC RX REV CODE- 250/637: Performed by: PHYSICIAN ASSISTANT

## 2018-01-21 PROCEDURE — 74011250636 HC RX REV CODE- 250/636: Performed by: SURGERY

## 2018-01-21 PROCEDURE — 74011000250 HC RX REV CODE- 250: Performed by: PHYSICIAN ASSISTANT

## 2018-01-21 PROCEDURE — 65270000029 HC RM PRIVATE

## 2018-01-21 PROCEDURE — 74011250637 HC RX REV CODE- 250/637: Performed by: SURGERY

## 2018-01-21 PROCEDURE — 83735 ASSAY OF MAGNESIUM: CPT | Performed by: PHYSICIAN ASSISTANT

## 2018-01-21 PROCEDURE — 36415 COLL VENOUS BLD VENIPUNCTURE: CPT | Performed by: PHYSICIAN ASSISTANT

## 2018-01-21 PROCEDURE — 74011250636 HC RX REV CODE- 250/636: Performed by: PHYSICIAN ASSISTANT

## 2018-01-21 RX ORDER — ACETAMINOPHEN 325 MG/1
650 TABLET ORAL
Status: DISCONTINUED | OUTPATIENT
Start: 2018-01-21 | End: 2018-01-22 | Stop reason: HOSPADM

## 2018-01-21 RX ADMIN — DOCUSATE SODIUM 100 MG: 100 CAPSULE, LIQUID FILLED ORAL at 12:14

## 2018-01-21 RX ADMIN — KETOROLAC TROMETHAMINE 15 MG: 30 INJECTION, SOLUTION INTRAMUSCULAR at 00:00

## 2018-01-21 RX ADMIN — Medication: at 22:52

## 2018-01-21 RX ADMIN — KETOROLAC TROMETHAMINE 15 MG: 30 INJECTION, SOLUTION INTRAMUSCULAR at 17:31

## 2018-01-21 RX ADMIN — ALPRAZOLAM 0.5 MG: 0.5 TABLET ORAL at 21:59

## 2018-01-21 RX ADMIN — ACETAMINOPHEN 1000 MG: 500 TABLET ORAL at 03:05

## 2018-01-21 RX ADMIN — METRONIDAZOLE 500 MG: 500 INJECTION, SOLUTION INTRAVENOUS at 12:15

## 2018-01-21 RX ADMIN — DEXTROSE MONOHYDRATE, SODIUM CHLORIDE, AND POTASSIUM CHLORIDE 75 ML/HR: 50; 4.5; 1.49 INJECTION, SOLUTION INTRAVENOUS at 12:14

## 2018-01-21 RX ADMIN — KETOROLAC TROMETHAMINE 15 MG: 30 INJECTION, SOLUTION INTRAMUSCULAR at 12:14

## 2018-01-21 RX ADMIN — KETOROLAC TROMETHAMINE 15 MG: 30 INJECTION, SOLUTION INTRAMUSCULAR at 23:47

## 2018-01-21 RX ADMIN — ENOXAPARIN SODIUM 40 MG: 40 INJECTION SUBCUTANEOUS at 17:31

## 2018-01-21 RX ADMIN — KETOROLAC TROMETHAMINE 15 MG: 30 INJECTION, SOLUTION INTRAMUSCULAR at 05:26

## 2018-01-21 RX ADMIN — METRONIDAZOLE 500 MG: 500 INJECTION, SOLUTION INTRAVENOUS at 00:01

## 2018-01-21 RX ADMIN — Medication 10 ML: at 05:28

## 2018-01-21 RX ADMIN — METRONIDAZOLE 500 MG: 500 INJECTION, SOLUTION INTRAVENOUS at 23:47

## 2018-01-21 RX ADMIN — LEVOFLOXACIN 750 MG: 5 INJECTION, SOLUTION INTRAVENOUS at 12:33

## 2018-01-21 NOTE — PROGRESS NOTES
General Surgery Daily Progress Note    Patient: Marylee Pace MRN: [id number]  SSN: [id number]    YOB: 1950  Age: 79 y.o. Sex: male      Admit Date: 1/18/2018    Subjective:   Pain slightly better today, worst pain yesterday @ 1730. Rapid response was called. Vital signs were unchanged at the time, but patient could not feel his fingers or toes. Pain regimen was adjusted and he felt better. Currently denies N/V. No fever. No BM. Current Facility-Administered Medications   Medication Dose Route Frequency    acetaminophen (TYLENOL) tablet 650 mg  650 mg Oral Q4H PRN    HYDROmorphone (PF) 15 mg/30 ml (DILAUDID) PCA   IntraVENous CONTINUOUS    ketorolac (TORADOL) injection 15 mg  15 mg IntraVENous Q6H    levoFLOXacin (LEVAQUIN) 750 mg in D5W IVPB  750 mg IntraVENous DAILY    metroNIDAZOLE (FLAGYL) IVPB premix 500 mg  500 mg IntraVENous Q12H    docusate sodium (COLACE) capsule 100 mg  100 mg Oral DAILY    sodium chloride (NS) flush 5-10 mL  5-10 mL IntraVENous Q8H    sodium chloride (NS) flush 5-10 mL  5-10 mL IntraVENous PRN    naloxone (NARCAN) injection 0.4 mg  0.4 mg IntraVENous PRN    diphenhydrAMINE (BENADRYL) injection 12.5 mg  12.5 mg IntraVENous Q4H PRN    ondansetron (ZOFRAN) injection 4 mg  4 mg IntraVENous Q4H PRN    dextrose 5% - 0.45% NaCl with KCl 20 mEq/L infusion  75 mL/hr IntraVENous CONTINUOUS    enoxaparin (LOVENOX) injection 40 mg  40 mg SubCUTAneous Q24H    ALPRAZolam (XANAX) tablet 0.5 mg  0.5 mg Oral Q8H PRN        Objective:         Patient Vitals for the past 8 hrs:   BP Temp Pulse Resp SpO2   01/21/18 0512 125/77 98.3 °F (36.8 °C) 60 18 96 %       Physical Exam:  General: Alert, cooperative, NAD  Lungs: Unlabored  Heart:  Regular rate and  rhythm  Abdomen: Soft, mild suprapubic tenderness, non-distended. + bowel sounds.    Extremities: Warm, moves all, no edema  Skin:  Warm and dry, no rash    Labs:   Recent Labs      01/20/18   0300   WBC  6.5   HGB 14.2   HCT  39.6   PLT  232     Recent Labs      01/21/18   0345  01/20/18   0300   NA   --   139   K   --   3.8   CL   --   103   CO2   --   28   GLU   --   109*   BUN   --   11   CREA   --   0.78   CA   --   9.3   MG  1.8  2.0       Assessment / Plan:   · Acute sigmoid diverticulitis. Still has pain. · Stay with fulls. NPO after midnight for possible AM CT. Will discuss with Chris  · AM Labs  · Continue PCA. · Levaquin/Flagyl IV   · Ambulate  · Nutritionist consult for low residue diet education. · Will need outpatient follow up with GI for colonoscopy in 6-8 weeks.      Ruddy Mascorro MD

## 2018-01-21 NOTE — PROGRESS NOTES
Problem: Falls - Risk of  Goal: *Absence of Falls  Document Sachin Fall Risk and appropriate interventions in the flowsheet.    Outcome: Progressing Towards Goal  Fall Risk Interventions:            Medication Interventions: Teach patient to arise slowly

## 2018-01-21 NOTE — ROUTINE PROCESS
Bedside and Verbal shift change report given to Phoebe Doe RN (oncoming nurse) by BRENDON Posada (offgoing nurse). Report included the following information SBAR, Kardex, Procedure Summary, Intake/Output, MAR, Accordion, Recent Results and Med Rec Status.

## 2018-01-21 NOTE — ROUTINE PROCESS
0700: Bedside and Verbal shift change report given to BRENDON Costello (oncoming nurse) by BRENDON Posada (offgoing nurse). Report included the following information SBAR, Kardex, Procedure Summary, Intake/Output, MAR, Accordion, Recent Results and Med Rec Status. 1900: Bedside and Verbal shift change report [facility] (on[facility] nurse) by BRENDON Costello (offgoing nurse). Report included the following information SBAR, Kardex, Procedure Summary, Intake/Output, MAR, Accordion, Recent Results and Med Rec Status.

## 2018-01-21 NOTE — PROGRESS NOTES
General Surgery Daily Progress Note    Patient: Annabel Castellon MRN: [id number]  SSN: [id number]    YOB: 1950  Age: 79 y.o. Sex: male      Admit Date: 1/18/2018    Subjective:   Pain still at 5/10. Denies N/V. No fever. Current Facility-Administered Medications   Medication Dose Route Frequency    HYDROmorphone (PF) 15 mg/30 ml (DILAUDID) PCA   IntraVENous CONTINUOUS    acetaminophen (TYLENOL) tablet 1,000 mg  1,000 mg Oral Q8H    ketorolac (TORADOL) injection 15 mg  15 mg IntraVENous Q6H    levoFLOXacin (LEVAQUIN) 750 mg in D5W IVPB  750 mg IntraVENous DAILY    metroNIDAZOLE (FLAGYL) IVPB premix 500 mg  500 mg IntraVENous Q12H    docusate sodium (COLACE) capsule 100 mg  100 mg Oral DAILY    sodium chloride (NS) flush 5-10 mL  5-10 mL IntraVENous Q8H    sodium chloride (NS) flush 5-10 mL  5-10 mL IntraVENous PRN    naloxone (NARCAN) injection 0.4 mg  0.4 mg IntraVENous PRN    diphenhydrAMINE (BENADRYL) injection 12.5 mg  12.5 mg IntraVENous Q4H PRN    ondansetron (ZOFRAN) injection 4 mg  4 mg IntraVENous Q4H PRN    dextrose 5% - 0.45% NaCl with KCl 20 mEq/L infusion  75 mL/hr IntraVENous CONTINUOUS    enoxaparin (LOVENOX) injection 40 mg  40 mg SubCUTAneous Q24H    ALPRAZolam (XANAX) tablet 0.5 mg  0.5 mg Oral Q8H PRN        Objective:      01/19 0701 - 01/20 1900  In: 960 [P.O.:960]  Out: -   Patient Vitals for the past 8 hrs:   BP Temp Pulse Resp SpO2   01/20/18 1743 134/90 97.9 °F (36.6 °C) 82 - 100 %   01/20/18 1727 (!) 143/91 - 68 18 97 %   01/20/18 1228 148/72 98.2 °F (36.8 °C) 66 18 98 %       Physical Exam:  General: Alert, cooperative, NAD  Lungs: Unlabored  Heart:  Regular rate and  rhythm  Abdomen: Soft, mild LLQ tenderness, non-distended. + bowel sounds.    Extremities: Warm, moves all, no edema  Skin:  Warm and dry, no rash    Labs:   Recent Labs      01/20/18   0300   WBC  6.5   HGB  14.2   HCT  39.6   PLT  232     Recent Labs      01/20/18   0300   NA  139   K  3.8 CL  103   CO2  28   GLU  109*   BUN  11   CREA  0.78   CA  9.3   MG  2.0       Assessment / Plan:   · Acute sigmoid diverticulitis. Still has pain. · Stay with fulls. · Will increase pain medication over the next 12 hours and re-assess,. · Levaquin/Flagyl IV   · Decrease IVF  · Ambulate  · Nutritionist consult for low residue diet education. · Will need outpatient follow up with GI for colonoscopy in 6-8 weeks.

## 2018-01-22 VITALS
SYSTOLIC BLOOD PRESSURE: 130 MMHG | HEART RATE: 55 BPM | OXYGEN SATURATION: 96 % | WEIGHT: 147 LBS | HEIGHT: 69 IN | DIASTOLIC BLOOD PRESSURE: 82 MMHG | RESPIRATION RATE: 18 BRPM | BODY MASS INDEX: 21.77 KG/M2 | TEMPERATURE: 98.1 F

## 2018-01-22 PROCEDURE — 74011250636 HC RX REV CODE- 250/636: Performed by: PHYSICIAN ASSISTANT

## 2018-01-22 PROCEDURE — 74011250637 HC RX REV CODE- 250/637: Performed by: PHYSICIAN ASSISTANT

## 2018-01-22 RX ORDER — TRAMADOL HYDROCHLORIDE 50 MG/1
50 TABLET ORAL
Status: DISCONTINUED | OUTPATIENT
Start: 2018-01-22 | End: 2018-01-22 | Stop reason: HOSPADM

## 2018-01-22 RX ORDER — TRAMADOL HYDROCHLORIDE 50 MG/1
50 TABLET ORAL
Qty: 20 TAB | Refills: 0 | Status: SHIPPED | OUTPATIENT
Start: 2018-01-22

## 2018-01-22 RX ADMIN — KETOROLAC TROMETHAMINE 15 MG: 30 INJECTION, SOLUTION INTRAMUSCULAR at 06:00

## 2018-01-22 RX ADMIN — DEXTROSE MONOHYDRATE, SODIUM CHLORIDE, AND POTASSIUM CHLORIDE 75 ML/HR: 50; 4.5; 1.49 INJECTION, SOLUTION INTRAVENOUS at 06:35

## 2018-01-22 RX ADMIN — DOCUSATE SODIUM 100 MG: 100 CAPSULE, LIQUID FILLED ORAL at 08:51

## 2018-01-22 RX ADMIN — LEVOFLOXACIN 750 MG: 5 INJECTION, SOLUTION INTRAVENOUS at 08:52

## 2018-01-22 NOTE — ROUTINE PROCESS
1/22/18   10:25AM  PCP HELEN appt scheduled with Dr. Jayla Banda on 1/26/18 at 1:00PM. Appt added to AVS. Olvin Baldwin CM Specialist

## 2018-01-22 NOTE — DISCHARGE SUMMARY
Surgery Discharge Summary     Patient ID:  Moise Rosales  [id number]  54 y.o.  1950    Admit date: 1/18/2018    Discharge date and time: 1/22/2018 11:04 AM     Admission Diagnoses:    Patient Active Problem List   Diagnosis Code    Acute diverticulitis K57.92       Discharge Diagnoses: There are no discharge diagnoses documented for the most recent discharge. Patient Active Problem List   Diagnosis Code    Acute diverticulitis K57.92       Procedures for this admission: [facility] Course: Mr. Sara Obrien was directly admitted to the hospital for acute sigmoid diverticulitis with failure of outpatient management. CT on admission showed sigmoid diverticulitis without perforation or abscess. He was managed with bowel rest and IV ABX. Pain improved and he was transition to PO ABX and diet was advanced. Pt was discharged home in stable condition on Levaquin/Flagyl and low residue diet. He was instructed to f/u with GI for colonoscopy in 6-8 weeks. Disposition: home    Discharged Condition : stable    Instructions: Follow-up in office  in 2 weeks.               Signed:  DRU Augustin  1/22/2018  11:12 AM

## 2018-01-22 NOTE — PROGRESS NOTES
Discharge instructions and prescriptions reviewed with the patient and his wife and any questions answered. Peripheral IV removed and work note given to patient.

## 2018-01-22 NOTE — ROUTINE PROCESS
Bedside and Written shift change report given to Marquis Mckeon RN (oncoming nurse) by BRENDON Posada (offgoing nurse).  Report included the following information SBAR, Kardex, Procedure Summary, Intake/Output, MAR, Accordion and Recent Results

## 2018-01-22 NOTE — PROGRESS NOTES
9680  Patient informed RN that doctor stated he was allowed to not be bothered from 7726-3857 in order for him to get sleep. RN reviewed progress notes from MDs and could not find anything indicating permission to stop vitals. RN informed patient of this and informed him that he had the right to refuse any treatment he wishes. Informed RN that he did not want to be awakened for vitals during the night. RN clarified with patient any overnight medications that were ordered. Patient stated it was ok for RN to awaken patient for overnight medication. Will continue to monitor patient. 3:25 AM  Charge RN notified by patient that someone had come in and drawn blood against his wishes. Stated he will be naming said person in his lawsuit when he files it in the morning. Reiterated that his doctor promised him he would not be bothered until 0700. RN came to see patient. Patient requested name of person to be written on board. RN stated that she would review with charge nurse if this was acceptable. Patient states he is furious. RN reminded patient of earlier discussion that there were no orders to not disturb after 2200. However, orders were placed to draw blood at 0400. Will notify supervisor of situation and continue to monitor. 0330  Supervisor notified of situation. Paged on call for Dr. Lona Downs to notify of situation. Left message with on call center. Will continue to monitor.

## 2018-02-18 NOTE — H&P
Patient Name: Bandar Fernandez   Account #: [id number]    Gender: Male    (age): 1950 (79)       Provider:     Francisco Javier Goddard MD        Referring Physician:     Jayla Banda MD  [address], [place], [address]  [phone] (phone)  [phone] (fax)        Chief Complaint: abdominal pain           History of Present Illness: The patient complains of abdominal pain. Symptoms began 7 weeks ago. It is localized as right lower quadrant pain. It is detailed as moderate in nature. Pain quality is described as band-like. It also radiates to the testicular. Discomfort typically lasts many minutes. It usually starts daily. Symptom triggers include nothing specific. Alleviating factors include nothing specific. Symptoms have been non-progressive/stable since onset. Alarm features noted: none. The patient also reports none,. Colonoscopy ten years ago. Finished recommended antibiotics after d/c from [facility] with resolution left. See [facility] CT report; inflammation about sigmoid without other finding. Past Medical History      Medical Conditions: Diverticulitis  High blood pressure  pancreatitis   Surgical Procedures: Tonsillectomy   Dx Studies: Colonoscopy,   Endo Posting, 2018   Medications: amlodipine 10 mg  Antioxidant  clonidine HCl 0.2 mg  metoprolol succinate 50 mg  tramadol 50 mg   Allergies: Patient has no known allergies   Immunizations: No Immunizations      Social History      Alcohol: Alcohol consumption: Monthly. Tobacco: Never smoker   Drugs: None   Exercise: Exercise less than 3 times a week. Caffeine: Daily. Family History No history of Colon Cancer, Colon Polyps, Esophogeal Cancer, GI Cancers, IBD (Crohn's or UC), Liver disease        Review of Systems:   Cardiovascular: Denies chest pain, irregular heart beat, palpitations, peripheral edema, syncope, Sweats.    Constitutional: Presents suffers from fatigue, loss of appetite. Denies fever, weight gain, weight loss. ENMT: Denies nose bleeds, sore throat, hearing loss. Endocrine: Denies excessive thirst, heat intolerance. Eyes: Denies loss of vision. Gastrointestinal: Presents suffers from abdominal pain, abdominal swelling, change in bowel habits, constipation, stomach cramps, Stool incontinence. Denies diarrhea, Bloating/gas, heartburn, jaundice, nausea, rectal bleeding, vomiting, dysphagia, rectal pain, hematemesis. Genitourinary: Denies dark urine, dysuria, frequent urination, hematuria, incontinence. Hematologic/Lymphatic: Denies easy bruising, prolonged bleeding. Integumentary: Denies itching, rashes, sun sensitivity. Musculoskeletal: Presents suffers from gout, joint pain. Denies arthritis, back pain, muscle weakness, stiffness. Neurological: Presents suffers from frequent headaches. Denies dizziness, fainting, memory loss. Psychiatric: Denies anxiety, depression, difficulty sleeping, hallucinations, nervousness, panic attacks, paranoia. Respiratory: Presents suffers from wheezing. Denies cough, dyspnea. Vital Signs: See RN notes      Physical Exam:   Constitutional:      Appearance: No distress, appears comfortable. Skin:      Inspection: No rash, no jaundice. Head/face: Inspection: Normacephalic, atraumatic. Eyes:      Conjunctivae/lids: Normal.   ENMT:      External: Normal.   Neck:      Neck: Normal appearance, trachea midline. Respiratory:      Effort: Normal respiratory effort, comfortable, speaks in complete sentences. Auscultation: normal breath sounds, no rubs, wheezes or rhonchi. Cardiovascular: Auscultation: normal, S1 and S2, no gallops , no rubs or murmurs . Gastrointestinal/Abdomen:      Abdomen: non-distended, nontender. Liver/Spleen: normal, normal size, Liver size and consistency normal, spleen is non-palpable.    Musculoskeletal:      Gait/station: normal.   Muscles: normal strength and tone, no atrophy or abnormal movements. Psychiatric:      Judgment/insight: Normal, normal judgement, normal insight. Mood and affect: Normal mood, affect full, no evidence of depression, anxiety or agitation. Lymphatic:      Neck: No lymphadenopathy in the cervical or supraclavicular chain. Impressions: Abnormal findings on diagnostic imaging of other parts of digestive tract  Screening colonoscopy (Screening for colonic neoplasia)  Low back pain       Plan: I've discussed colonoscopy possible biopsy, polypectomy, cautery, injection, alternatives, complications including but not limited to pain, cardiopulmonary event, bleeding, perforation requiring additional blood transfusion or operative repair; all questions answered.

## 2018-02-23 ENCOUNTER — HOSPITAL ENCOUNTER (OUTPATIENT)
Age: 68
Setting detail: OUTPATIENT SURGERY
Discharge: HOME | End: 2018-02-23
Attending: INTERNAL MEDICINE | Admitting: INTERNAL MEDICINE
Payer: MEDICARE

## 2018-02-23 ENCOUNTER — ANESTHESIA EVENT (OUTPATIENT)
Dept: ENDOSCOPY | Age: 68
End: 2018-02-23
Payer: MEDICARE

## 2018-02-23 ENCOUNTER — ANESTHESIA (OUTPATIENT)
Dept: ENDOSCOPY | Age: 68
End: 2018-02-23
Payer: MEDICARE

## 2018-02-23 VITALS
BODY MASS INDEX: 21.77 KG/M2 | RESPIRATION RATE: 14 BRPM | WEIGHT: 147 LBS | HEART RATE: 58 BPM | TEMPERATURE: 97.7 F | DIASTOLIC BLOOD PRESSURE: 69 MMHG | OXYGEN SATURATION: 100 % | HEIGHT: 69 IN | SYSTOLIC BLOOD PRESSURE: 115 MMHG

## 2018-02-23 PROCEDURE — 76040000019: Performed by: INTERNAL MEDICINE

## 2018-02-23 PROCEDURE — 77030009426 HC FCPS BIOP ENDOSC BSC -B: Performed by: INTERNAL MEDICINE

## 2018-02-23 PROCEDURE — 76060000031 HC ANESTHESIA FIRST 0.5 HR: Performed by: INTERNAL MEDICINE

## 2018-02-23 PROCEDURE — 74011250636 HC RX REV CODE- 250/636: Performed by: INTERNAL MEDICINE

## 2018-02-23 PROCEDURE — 77030013992 HC SNR POLYP ENDOSC BSC -B: Performed by: INTERNAL MEDICINE

## 2018-02-23 PROCEDURE — 88305 TISSUE EXAM BY PATHOLOGIST: CPT | Performed by: INTERNAL MEDICINE

## 2018-02-23 PROCEDURE — 74011250636 HC RX REV CODE- 250/636

## 2018-02-23 PROCEDURE — 74011250637 HC RX REV CODE- 250/637: Performed by: INTERNAL MEDICINE

## 2018-02-23 RX ORDER — METOPROLOL SUCCINATE 50 MG/1
50 TABLET, EXTENDED RELEASE ORAL DAILY
COMMUNITY

## 2018-02-23 RX ORDER — PROPOFOL 10 MG/ML
INJECTION, EMULSION INTRAVENOUS
Status: DISCONTINUED | OUTPATIENT
Start: 2018-02-23 | End: 2018-02-23 | Stop reason: HOSPADM

## 2018-02-23 RX ORDER — FLUMAZENIL 0.1 MG/ML
0.2 INJECTION INTRAVENOUS
Status: DISCONTINUED | OUTPATIENT
Start: 2018-02-23 | End: 2018-02-23 | Stop reason: HOSPADM

## 2018-02-23 RX ORDER — SODIUM CHLORIDE 9 MG/ML
50 INJECTION, SOLUTION INTRAVENOUS CONTINUOUS
Status: DISCONTINUED | OUTPATIENT
Start: 2018-02-23 | End: 2018-02-23 | Stop reason: HOSPADM

## 2018-02-23 RX ORDER — LEVOFLOXACIN 750 MG/1
750 TABLET ORAL ONCE
Status: COMPLETED | OUTPATIENT
Start: 2018-02-23 | End: 2018-02-23

## 2018-02-23 RX ORDER — PROPOFOL 10 MG/ML
INJECTION, EMULSION INTRAVENOUS AS NEEDED
Status: DISCONTINUED | OUTPATIENT
Start: 2018-02-23 | End: 2018-02-23 | Stop reason: HOSPADM

## 2018-02-23 RX ORDER — ATROPINE SULFATE 0.1 MG/ML
0.5 INJECTION INTRAVENOUS
Status: DISCONTINUED | OUTPATIENT
Start: 2018-02-23 | End: 2018-02-23 | Stop reason: HOSPADM

## 2018-02-23 RX ORDER — AMLODIPINE BESYLATE 10 MG/1
10 TABLET ORAL DAILY
COMMUNITY

## 2018-02-23 RX ORDER — CLONIDINE HYDROCHLORIDE 0.2 MG/1
0.2 TABLET ORAL
COMMUNITY

## 2018-02-23 RX ORDER — LEVOFLOXACIN 750 MG/1
750 TABLET ORAL DAILY
Qty: 10 TAB | Refills: 0 | Status: SHIPPED | OUTPATIENT
Start: 2018-02-23 | End: 2018-03-05

## 2018-02-23 RX ORDER — MULTIVIT WITH MINERALS/HERBS
1 TABLET ORAL DAILY
COMMUNITY

## 2018-02-23 RX ORDER — MELATONIN
1000 DAILY
COMMUNITY

## 2018-02-23 RX ORDER — ASCORBIC ACID 500 MG
500 TABLET ORAL DAILY
COMMUNITY

## 2018-02-23 RX ORDER — MULTIVITAMIN WITH IRON
1 TABLET ORAL DAILY
COMMUNITY

## 2018-02-23 RX ORDER — MIDAZOLAM HYDROCHLORIDE 1 MG/ML
.25-5 INJECTION, SOLUTION INTRAMUSCULAR; INTRAVENOUS
Status: DISCONTINUED | OUTPATIENT
Start: 2018-02-23 | End: 2018-02-23 | Stop reason: HOSPADM

## 2018-02-23 RX ORDER — METRONIDAZOLE 250 MG/1
500 TABLET ORAL ONCE
Status: COMPLETED | OUTPATIENT
Start: 2018-02-23 | End: 2018-02-23

## 2018-02-23 RX ORDER — NALOXONE HYDROCHLORIDE 0.4 MG/ML
0.4 INJECTION, SOLUTION INTRAMUSCULAR; INTRAVENOUS; SUBCUTANEOUS
Status: DISCONTINUED | OUTPATIENT
Start: 2018-02-23 | End: 2018-02-23 | Stop reason: HOSPADM

## 2018-02-23 RX ORDER — LANOLIN ALCOHOL/MO/W.PET/CERES
500 CREAM (GRAM) TOPICAL DAILY
COMMUNITY

## 2018-02-23 RX ORDER — FENTANYL CITRATE 50 UG/ML
100 INJECTION, SOLUTION INTRAMUSCULAR; INTRAVENOUS
Status: DISCONTINUED | OUTPATIENT
Start: 2018-02-23 | End: 2018-02-23 | Stop reason: HOSPADM

## 2018-02-23 RX ORDER — EPINEPHRINE 0.1 MG/ML
1 INJECTION INTRACARDIAC; INTRAVENOUS
Status: DISCONTINUED | OUTPATIENT
Start: 2018-02-23 | End: 2018-02-23 | Stop reason: HOSPADM

## 2018-02-23 RX ORDER — DEXTROMETHORPHAN/PSEUDOEPHED 2.5-7.5/.8
1.2 DROPS ORAL
Status: DISCONTINUED | OUTPATIENT
Start: 2018-02-23 | End: 2018-02-23 | Stop reason: HOSPADM

## 2018-02-23 RX ORDER — METRONIDAZOLE 500 MG/1
500 TABLET ORAL 2 TIMES DAILY
Qty: 20 TAB | Refills: 0 | Status: SHIPPED | OUTPATIENT
Start: 2018-02-23 | End: 2018-03-05

## 2018-02-23 RX ADMIN — PROPOFOL 80 MG: 10 INJECTION, EMULSION INTRAVENOUS at 07:16

## 2018-02-23 RX ADMIN — METRONIDAZOLE 500 MG: 250 TABLET ORAL at 08:08

## 2018-02-23 RX ADMIN — PROPOFOL 125 MCG/KG/MIN: 10 INJECTION, EMULSION INTRAVENOUS at 07:16

## 2018-02-23 RX ADMIN — SODIUM CHLORIDE 50 ML/HR: 900 INJECTION, SOLUTION INTRAVENOUS at 07:45

## 2018-02-23 RX ADMIN — LEVOFLOXACIN 750 MG: 750 TABLET, FILM COATED ORAL at 08:09

## 2018-02-23 RX ADMIN — SODIUM CHLORIDE 50 ML/HR: 900 INJECTION, SOLUTION INTRAVENOUS at 07:00

## 2018-02-23 NOTE — PROCEDURES
[address]  [phone]      2018    Colonoscopy Procedure Note  Moise Rosales  :  1950  [facility] Medical Record Number: [id number]    Indications:     Screening colonoscopy  PCP:  Wayne Pathak MD  Anesthesia/Sedation: Conscious Sedation/Moderate Sedation  Endoscopist:  Dr. Abhinav Tenorio  Complications:  None  Estimate Blood Loss:  None    Permit:  The indications, risks, benefits and alternatives were reviewed with the patient or their decision maker who was provided an opportunity to ask questions and all questions were answered. The specific risks of colonoscopy with conscious sedation were reviewed, including but not limited to anesthetic complication, bleeding, adverse drug reaction, missed lesion, infection, IV site reactions, and intestinal perforation which would lead to the need for surgical repair. Alternatives to colonoscopy including radiographic imaging, observation without testing, or laboratory testing were reviewed including the limitations of those alternatives. After considering the options and having all their questions answered, the patient or their decision maker provided both verbal and written consent to proceed. Procedure in Detail:  After obtaining informed consent, positioning of the patient in the left lateral decubitus position, and conduction of a pre-procedure pause or \"time out\" the endoscope was introduced into the anus and advanced to the terminal ileum. The quality of the colonic preparation was adequate. A careful inspection was made as the colonoscope was withdrawn, findings and interventions are described below. Appendiceal orifice photographed    Findings:    Too numerous to count sigmoid, descending diverticulosis with sigmoid intense edema, purulent exudate  Sessile 10 mm transverse polyp area hepatic flexure    Specimens: polyp removed cold snare    Complications:   None; patient tolerated the procedure well. Estimated blood loss: none    Impression:  Active colonic diverticulitits; incidental small polyp    Recommendations:      - resume antibiotics; advise follow up Dr Max Fuentes   No routine follow up exam planned    Thank you for entrusting me with this patient's care. Please do not hesitate to contact me with any questions or if I can be of assistance with any of your other patients' GI needs.     Signed By: Segundo Damon MD                        February 23, 2018

## 2018-02-23 NOTE — ANESTHESIA POSTPROCEDURE EVALUATION
Post-Anesthesia Evaluation and Assessment    Patient: Marii Falcon MRN: [id number]  SSN: [id number]    YOB: 1950  Age: 79 y.o. Sex: male       Cardiovascular Function/Vital Signs  Visit Vitals    BP 93/65    Pulse (!) 59    Temp 36.5 °C (97.7 °F)    Resp 17    Ht 5' 9\" (1.753 m)    Wt 66.7 kg (147 lb)    SpO2 99%    BMI 21.71 kg/m2       Patient is status post MAC anesthesia for Procedure(s):  COLONOSCOPY  ENDOSCOPIC POLYPECTOMY. Nausea/Vomiting: None    Postoperative hydration reviewed and adequate. Pain:  Pain Scale 1: Numeric (0 - 10) (02/23/18 0747)  Pain Intensity 1: 0 (02/23/18 0747)   Managed    Neurological Status: At baseline    Mental Status and Level of Consciousness: Arousable    Pulmonary Status:   O2 Device: Room air (02/23/18 0747)   Adequate oxygenation and airway patent    Complications related to anesthesia: None    Post-anesthesia assessment completed.  No concerns    Signed By: Emilio Milton MD     February 23, 2018

## 2018-02-23 NOTE — ANESTHESIA PREPROCEDURE EVALUATION
Anesthetic History   No history of anesthetic complications            Review of Systems / Medical History  Patient summary reviewed, nursing notes reviewed and pertinent labs reviewed    Pulmonary  Within defined limits                 Neuro/Psych   Within defined limits           Cardiovascular    Hypertension: well controlled                   GI/Hepatic/Renal  Within defined limits              Endo/Other  Within defined limits           Other Findings              Physical Exam    Airway  Mallampati: II  TM Distance: 4 - 6 cm  Neck ROM: normal range of motion   Mouth opening: Normal     Cardiovascular    Rhythm: regular  Rate: normal         Dental  No notable dental hx       Pulmonary  Breath sounds clear to auscultation               Abdominal         Other Findings            Anesthetic Plan    ASA: 2  Anesthesia type: MAC            Anesthetic plan and risks discussed with: Patient

## 2018-02-23 NOTE — PERIOP NOTES
[id number]  Anesthesia staff at patient's bedside administering anesthesia and monitoring patients vital signs throughout procedure. See anesthesia note. 0839  Endoscope was pre-cleaned at bedside immediately following procedure by Lila Mc 50  Patient tolerated procedure without problems. Abdomen soft and patient arousable and voices no complaints Report received from CRNA, see anesthesia note. Patient transported to endoscopy recovery area. Report given to Alvarez Nava RN.

## 2018-02-23 NOTE — ROUTINE PROCESS
Mick Ramon  1950  [id number]    Situation:  Verbal report received from: Bee Pope RN  Procedure: Procedure(s):  COLONOSCOPY  ENDOSCOPIC POLYPECTOMY    Background:    Preoperative diagnosis: ABNORMAL FINDINGS ON DIAGNOSTIC IMAGING OF OTHER PARTS OF DIGESTIVE TRACT  SCREENING FOR COLONIC NEOPLASIA  Postoperative diagnosis: * No post-op diagnosis entered *    :  Dr. Katharina Hidalgo  Assistant(s): Endoscopy Technician-1: Prince Williamson  Endoscopy RN-1: Bee Pope RN    Specimens:   ID Type Source Tests Collected by Time Destination   1 : hepatic flexure polyp Preservative Hepatic Flexure  Calli Duarte MD 2/23/2018 2093 Pathology     H. Pylori  no    Assessment:  Intra-procedure medications     Anesthesia gave intra-procedu    Intravenous fluids: NS@ KVO     Vital signs stable     Abdominal assessment: round and soft     Recommendation:  Discharge patient per MD order. Family or Friend   Permission to share finding with family or friend yes. Endoscopy discharge instructions have been reviewed and given to patient and spouse. The patient and spouse verbalized understanding and acceptance of instructions.

## 2018-02-23 NOTE — DISCHARGE INSTRUCTIONS
[facility]  [id number]  1950    DISCHARGE INSTRUCTIONS  Discomfort:  Redness at IV site- apply warm compress to area; if redness or soreness persist- contact your physician. There may be a slight amount of blood passed from the rectum. Gaseous discomfort - walking, belching will help relieve any discomfort. You may not operate a vehicle for 12 hours. You may not engage in an occupation involving machinery or appliances for rest of today. You may not drink alcoholic beverages for at least 12 hours. Avoid making any critical decisions for at least 24 hours. DIET:   High fiber diet. - however -  remember your colon is empty and a heavy meal will produce gas. Avoid these foods:  vegetables, fried / greasy foods, carbonated drinks for today. ACTIVITY:  You may resume your normal daily activities it is recommended that you spend the remainder of the day resting -  avoid any strenuous activity. CALL M.D. ANY SIGN OF:   Increasing pain, nausea, vomiting  Abdominal distension (swelling)  New increased bleeding (oral or rectal)  Fever (chills)  Pain in chest area  Bloody discharge from nose or mouth  Shortness of breath     Follow-up Instructions:   Active colonic diverticulitits; incidental small polyp  Resume antibiotics    Call Dr. Xavier Jasso for follow up appointment        Gm Tom from Nurse    The following personal items collected during your admission are returned to you:   Dental Appliance: Dental Appliances: Lowers, Uppers  Vision: Visual Aid: Glasses, At bedside  Hearing Aid:    Jewelry:    Clothing:    Other Valuables:    Valuables sent to safe:

## 2018-03-05 ENCOUNTER — HOSPITAL ENCOUNTER (OUTPATIENT)
Dept: PREADMISSION TESTING | Age: 68
Discharge: HOME | DRG: 331 | End: 2018-03-05
Payer: MEDICARE

## 2018-03-05 VITALS
RESPIRATION RATE: 16 BRPM | BODY MASS INDEX: 21.58 KG/M2 | HEART RATE: 83 BPM | OXYGEN SATURATION: 97 % | WEIGHT: 145.72 LBS | SYSTOLIC BLOOD PRESSURE: 104 MMHG | HEIGHT: 69 IN | TEMPERATURE: 98 F | DIASTOLIC BLOOD PRESSURE: 71 MMHG

## 2018-03-05 LAB
ABO + RH BLD: NORMAL
ALBUMIN SERPL-MCNC: 3.8 G/DL (ref 3.5–5)
ALBUMIN/GLOB SERPL: 1.4 {RATIO} (ref 1.1–2.2)
ALP SERPL-CCNC: 45 U/L (ref 45–117)
ALT SERPL-CCNC: 69 U/L (ref 12–78)
ANION GAP SERPL CALC-SCNC: 7 MMOL/L (ref 5–15)
AST SERPL-CCNC: 42 U/L (ref 15–37)
ATRIAL RATE: 56 BPM
BASOPHILS # BLD: 0 K/UL (ref 0–0.1)
BASOPHILS NFR BLD: 0 % (ref 0–1)
BILIRUB SERPL-MCNC: 0.4 MG/DL (ref 0.2–1)
BLOOD GROUP ANTIBODIES SERPL: NORMAL
BUN SERPL-MCNC: 15 MG/DL (ref 6–20)
BUN/CREAT SERPL: 21 (ref 12–20)
CALCIUM SERPL-MCNC: 8.9 MG/DL (ref 8.5–10.1)
CALCULATED P AXIS, ECG09: 65 DEGREES
CALCULATED R AXIS, ECG10: 53 DEGREES
CALCULATED T AXIS, ECG11: 50 DEGREES
CHLORIDE SERPL-SCNC: 103 MMOL/L (ref 97–108)
CO2 SERPL-SCNC: 33 MMOL/L (ref 21–32)
CREAT SERPL-MCNC: 0.7 MG/DL (ref 0.7–1.3)
DIAGNOSIS, 93000: NORMAL
DIFFERENTIAL METHOD BLD: NORMAL
EOSINOPHIL # BLD: 0.1 K/UL (ref 0–0.4)
EOSINOPHIL NFR BLD: 1 % (ref 0–7)
ERYTHROCYTE [DISTWIDTH] IN BLOOD BY AUTOMATED COUNT: 13 % (ref 11.5–14.5)
GLOBULIN SER CALC-MCNC: 2.8 G/DL (ref 2–4)
GLUCOSE SERPL-MCNC: 72 MG/DL (ref 65–100)
HCT VFR BLD AUTO: 42 % (ref 36.6–50.3)
HGB BLD-MCNC: 14.2 G/DL (ref 12.1–17)
IMM GRANULOCYTES # BLD: 0 K/UL (ref 0–0.04)
IMM GRANULOCYTES NFR BLD AUTO: 0 % (ref 0–0.5)
LYMPHOCYTES # BLD: 1.4 K/UL (ref 0.8–3.5)
LYMPHOCYTES NFR BLD: 20 % (ref 12–49)
MCH RBC QN AUTO: 31.2 PG (ref 26–34)
MCHC RBC AUTO-ENTMCNC: 33.8 G/DL (ref 30–36.5)
MCV RBC AUTO: 92.3 FL (ref 80–99)
MONOCYTES # BLD: 0.9 K/UL (ref 0–1)
MONOCYTES NFR BLD: 13 % (ref 5–13)
NEUTS SEG # BLD: 4.5 K/UL (ref 1.8–8)
NEUTS SEG NFR BLD: 65 % (ref 32–75)
NRBC # BLD: 0 K/UL (ref 0–0.01)
NRBC BLD-RTO: 0 PER 100 WBC
P-R INTERVAL, ECG05: 170 MS
PLATELET # BLD AUTO: 195 K/UL (ref 150–400)
PMV BLD AUTO: 11.4 FL (ref 8.9–12.9)
POTASSIUM SERPL-SCNC: 4.3 MMOL/L (ref 3.5–5.1)
PROT SERPL-MCNC: 6.6 G/DL (ref 6.4–8.2)
Q-T INTERVAL, ECG07: 416 MS
QRS DURATION, ECG06: 82 MS
QTC CALCULATION (BEZET), ECG08: 401 MS
RBC # BLD AUTO: 4.55 M/UL (ref 4.1–5.7)
SODIUM SERPL-SCNC: 143 MMOL/L (ref 136–145)
SPECIMEN EXP DATE BLD: NORMAL
VENTRICULAR RATE, ECG03: 56 BPM
WBC # BLD AUTO: 7 K/UL (ref 4.1–11.1)

## 2018-03-05 PROCEDURE — 93005 ELECTROCARDIOGRAM TRACING: CPT

## 2018-03-05 PROCEDURE — 85025 COMPLETE CBC W/AUTO DIFF WBC: CPT | Performed by: SURGERY

## 2018-03-05 PROCEDURE — 86900 BLOOD TYPING SEROLOGIC ABO: CPT | Performed by: ANESTHESIOLOGY

## 2018-03-05 PROCEDURE — 80053 COMPREHEN METABOLIC PANEL: CPT | Performed by: SURGERY

## 2018-03-05 PROCEDURE — 36415 COLL VENOUS BLD VENIPUNCTURE: CPT | Performed by: SURGERY

## 2018-03-05 RX ORDER — ALPRAZOLAM 1 MG/1
1 TABLET ORAL
COMMUNITY

## 2018-03-05 NOTE — PERIOP NOTES
[facility]  PREOPERATIVE INSTRUCTIONS    Surgery Date:   3/7/18      Surgery arrival time given by surgeon: NO  (If [facility] staff will call you between 3pm - 7pm the day before surgery with your arrival time. If your surgery is on a Monday, we will call you the preceding Friday. Please call [phone] after 7pm if you did not receive your arrival time.)  1. Report  to the 2nd Floor Admitting Desk on the day of your surgery. Bring your insurance card, photo identification, and any copayment (if applicable). 2. You must have a responsible adult to drive you home and stay with you the first 24 hours after surgery if you are going home the same day of your surgery. 3. Nothing to eat or drink after midnight the night before surgery. This means NO water, gum, mints, coffee, juice, etc.    4. MEDICATIONS TO TAKE THE MORNING OF SURGERY WITH A SIP OF WATER:Amlodipine  5. No alcoholic beverages 24 hours before and after your surgery. 6. If you are being admitted to the hospital,please leave personal belongings/luggage in your car until you have an assigned hospital room number. ( The hospital discharge time is 12 PM NOON. Your adult  should be at the hospital prior to the noon discharge time unless otherwise instructed.)   7. STOP Aspirin and/or any non-steroidal anti-inflammatory drugs (i.e. Ibuprofen, Naproxen, Advil, Aleve) as directed by your surgeon. You may take Tylenol. Stop herbal supplements 1 week prior to  surgery. 8. If you are currently taking Plavix, Coumadin,or any other blood-thinning/ anticoagulant medication contact your surgeon for instructions. 9. Wear comfortable clothes. Wear your glasses instead of contacts. Please leave all money, jewelry and valuables at home. No make up, particularly mascara, the day of surgery. 10.  REMOVE ALL body piercings, rings,and jewelry and leave at home. Wear your hair loose or down, no pony-tails, buns, or any metal hair clips.    11. If you shower the morning of surgery, please do not apply any lotions, powders, or deodorants afterwards. Do not shave any body area within 24 hours of your surgery. 12. Please follow all instructions to avoid any potential surgical cancellation. 13. Should your physical condition change, (i.e. fever, cold, flu, etc.) please notify your surgeon as soon as possible. 14. It is important to be on time. If a situation occurs where you may be delayed, please call:  [phone] / on the day of surgery. 15. The Preadmission Testing staff can be reached at 21 [phone]. 16. Special instructions: free  parking,bowel prep per surgeon,Bring completed PTA medication list with you on the day of your surgery. 17. The patient was contacted  in person. He  verbalize  understanding of all instructions does not  need reinforcement.

## 2018-03-06 ENCOUNTER — ANESTHESIA EVENT (OUTPATIENT)
Dept: SURGERY | Age: 68
DRG: 331 | End: 2018-03-06
Payer: MEDICARE

## 2018-03-06 NOTE — PERIOP NOTES
Patient is having surgery with both Dr. Max Fuentes and Dr. Linette Balbuena tomorrow, 3/7/2018. Spoke to [employer] from Dr. Curt Degroot office confirming that both Dr. Max Fuentes and Dr. Linette Balbuena will see the patient in preop before surgery to sign the consent.   DOS: 3/7/2018

## 2018-03-07 ENCOUNTER — ANESTHESIA (OUTPATIENT)
Dept: SURGERY | Age: 68
DRG: 331 | End: 2018-03-07
Payer: MEDICARE

## 2018-03-07 ENCOUNTER — HOSPITAL ENCOUNTER (INPATIENT)
Age: 68
LOS: 4 days | Discharge: HOME | DRG: 331 | End: 2018-03-11
Attending: SURGERY | Admitting: SURGERY
Payer: MEDICARE

## 2018-03-07 DIAGNOSIS — K57.32 SIGMOID DIVERTICULITIS: Primary | ICD-10-CM

## 2018-03-07 DIAGNOSIS — K57.92 ACUTE DIVERTICULITIS: ICD-10-CM

## 2018-03-07 PROCEDURE — 77030022474 HC RELD STPLR ENDO GIA COVD -C: Performed by: SURGERY

## 2018-03-07 PROCEDURE — 74011250636 HC RX REV CODE- 250/636: Performed by: ANESTHESIOLOGY

## 2018-03-07 PROCEDURE — 77030032490 HC SLV COMPR SCD KNE COVD -B: Performed by: SURGERY

## 2018-03-07 PROCEDURE — 76060000039 HC ANESTHESIA 4 TO 4.5 HR: Performed by: SURGERY

## 2018-03-07 PROCEDURE — 74011250636 HC RX REV CODE- 250/636: Performed by: SURGERY

## 2018-03-07 PROCEDURE — 77030011640 HC PAD GRND REM COVD -A: Performed by: SURGERY

## 2018-03-07 PROCEDURE — 77030036554: Performed by: SURGERY

## 2018-03-07 PROCEDURE — 77030022952 HC TRCR ENDOSC COVD -C: Performed by: SURGERY

## 2018-03-07 PROCEDURE — 77030002986 HC SUT PROL J&J -A: Performed by: SURGERY

## 2018-03-07 PROCEDURE — 0DJD8ZZ INSPECTION OF LOWER INTESTINAL TRACT, VIA NATURAL OR ARTIFICIAL OPENING ENDOSCOPIC: ICD-10-PCS | Performed by: SURGERY

## 2018-03-07 PROCEDURE — 77030018673: Performed by: SURGERY

## 2018-03-07 PROCEDURE — 77030018832 HC SOL IRR H20 ICUM -A: Performed by: SURGERY

## 2018-03-07 PROCEDURE — 77030026438 HC STYL ET INTUB CARD -A: Performed by: ANESTHESIOLOGY

## 2018-03-07 PROCEDURE — 77030015791 HC CATH FOL DRN LXF BARD -A: Performed by: SURGERY

## 2018-03-07 PROCEDURE — C9290 INJ, BUPIVACAINE LIPOSOME: HCPCS | Performed by: SURGERY

## 2018-03-07 PROCEDURE — 77030032490 HC SLV COMPR SCD KNE COVD -B

## 2018-03-07 PROCEDURE — 88307 TISSUE EXAM BY PATHOLOGIST: CPT | Performed by: SURGERY

## 2018-03-07 PROCEDURE — 74011000250 HC RX REV CODE- 250: Performed by: ANESTHESIOLOGY

## 2018-03-07 PROCEDURE — 0DTN0ZZ RESECTION OF SIGMOID COLON, OPEN APPROACH: ICD-10-PCS | Performed by: SURGERY

## 2018-03-07 PROCEDURE — 77030010507 HC ADH SKN DERMBND J&J -B: Performed by: SURGERY

## 2018-03-07 PROCEDURE — 77030019927 HC TBNG IRR CYSTO BAXT -A: Performed by: SURGERY

## 2018-03-07 PROCEDURE — 77030037032 HC INSRT SCIS CLICKLLINE DISP STOR -B: Performed by: SURGERY

## 2018-03-07 PROCEDURE — 77030008684 HC TU ET CUF COVD -B: Performed by: ANESTHESIOLOGY

## 2018-03-07 PROCEDURE — 77030022473 HC HNDL ENDO GIA UNIV USDA -C: Performed by: SURGERY

## 2018-03-07 PROCEDURE — 77030035051: Performed by: SURGERY

## 2018-03-07 PROCEDURE — 77030002966 HC SUT PDS J&J -A: Performed by: SURGERY

## 2018-03-07 PROCEDURE — C1765 ADHESION BARRIER: HCPCS | Performed by: SURGERY

## 2018-03-07 PROCEDURE — C1729 CATH, DRAINAGE: HCPCS | Performed by: SURGERY

## 2018-03-07 PROCEDURE — C1758 CATHETER, URETERAL: HCPCS | Performed by: SURGERY

## 2018-03-07 PROCEDURE — 97161 PT EVAL LOW COMPLEX 20 MIN: CPT

## 2018-03-07 PROCEDURE — 74011000258 HC RX REV CODE- 258: Performed by: SURGERY

## 2018-03-07 PROCEDURE — 74011000250 HC RX REV CODE- 250: Performed by: SURGERY

## 2018-03-07 PROCEDURE — 76010000175 HC OR TIME 4 TO 4.5 HR INTENSV-TIER 1: Performed by: SURGERY

## 2018-03-07 PROCEDURE — 77030018836 HC SOL IRR NACL ICUM -A: Performed by: SURGERY

## 2018-03-07 PROCEDURE — 77030008467 HC STPLR SKN COVD -B: Performed by: SURGERY

## 2018-03-07 PROCEDURE — 77030002933 HC SUT MCRYL J&J -A: Performed by: SURGERY

## 2018-03-07 PROCEDURE — 76210000006 HC OR PH I REC 0.5 TO 1 HR: Performed by: SURGERY

## 2018-03-07 PROCEDURE — 74011250637 HC RX REV CODE- 250/637: Performed by: INTERNAL MEDICINE

## 2018-03-07 PROCEDURE — 74011000250 HC RX REV CODE- 250

## 2018-03-07 PROCEDURE — 77030034850: Performed by: SURGERY

## 2018-03-07 PROCEDURE — 77030020782 HC GWN BAIR PAWS FLX 3M -B

## 2018-03-07 PROCEDURE — 77030019908 HC STETH ESOPH SIMS -A: Performed by: ANESTHESIOLOGY

## 2018-03-07 PROCEDURE — 74011250636 HC RX REV CODE- 250/636

## 2018-03-07 PROCEDURE — 77030034628 HC LIGASURE LAP SEAL DIV MD COVD -F: Performed by: SURGERY

## 2018-03-07 PROCEDURE — C1769 GUIDE WIRE: HCPCS | Performed by: SURGERY

## 2018-03-07 PROCEDURE — 97116 GAIT TRAINING THERAPY: CPT

## 2018-03-07 PROCEDURE — 65270000029 HC RM PRIVATE

## 2018-03-07 PROCEDURE — 74011250636 HC RX REV CODE- 250/636: Performed by: PHYSICIAN ASSISTANT

## 2018-03-07 PROCEDURE — 77030011264 HC ELECTRD BLD EXT COVD -A: Performed by: SURGERY

## 2018-03-07 PROCEDURE — 77030009527 HC GEL PRT SYS AMR -E: Performed by: SURGERY

## 2018-03-07 PROCEDURE — 77030008771 HC TU NG SALEM SUMP -A: Performed by: ANESTHESIOLOGY

## 2018-03-07 PROCEDURE — 0T788DZ DILATION OF BILATERAL URETERS WITH INTRALUMINAL DEVICE, VIA NATURAL OR ARTIFICIAL OPENING ENDOSCOPIC: ICD-10-PCS | Performed by: UROLOGY

## 2018-03-07 PROCEDURE — 74011250637 HC RX REV CODE- 250/637: Performed by: SURGERY

## 2018-03-07 PROCEDURE — 77030010286 HC STPLR ENDOSC COVD -D: Performed by: SURGERY

## 2018-03-07 PROCEDURE — 77030013079 HC BLNKT BAIR HGGR 3M -A: Performed by: ANESTHESIOLOGY

## 2018-03-07 PROCEDURE — 77030020061 HC IV BLD WRMR ADMIN SET 3M -B: Performed by: ANESTHESIOLOGY

## 2018-03-07 PROCEDURE — 77030008756 HC TU IRR SUC STRY -B: Performed by: SURGERY

## 2018-03-07 PROCEDURE — 77030020747 HC TU INSUF ENDOSC TELE -A: Performed by: SURGERY

## 2018-03-07 PROCEDURE — 77030035048 HC TRCR ENDOSC OPTCL COVD -B: Performed by: SURGERY

## 2018-03-07 PROCEDURE — 77030016151 HC PROTCTR LNS DFOG COVD -B: Performed by: SURGERY

## 2018-03-07 RX ORDER — HEPARIN SODIUM 5000 [USP'U]/ML
5000 INJECTION, SOLUTION INTRAVENOUS; SUBCUTANEOUS EVERY 8 HOURS
Status: DISCONTINUED | OUTPATIENT
Start: 2018-03-07 | End: 2018-03-08

## 2018-03-07 RX ORDER — ONDANSETRON 2 MG/ML
4 INJECTION INTRAMUSCULAR; INTRAVENOUS AS NEEDED
Status: DISCONTINUED | OUTPATIENT
Start: 2018-03-07 | End: 2018-03-07 | Stop reason: HOSPADM

## 2018-03-07 RX ORDER — LIDOCAINE HYDROCHLORIDE 10 MG/ML
0.1 INJECTION, SOLUTION EPIDURAL; INFILTRATION; INTRACAUDAL; PERINEURAL AS NEEDED
Status: DISCONTINUED | OUTPATIENT
Start: 2018-03-07 | End: 2018-03-07 | Stop reason: HOSPADM

## 2018-03-07 RX ORDER — ROCURONIUM BROMIDE 10 MG/ML
INJECTION, SOLUTION INTRAVENOUS AS NEEDED
Status: DISCONTINUED | OUTPATIENT
Start: 2018-03-07 | End: 2018-03-07 | Stop reason: HOSPADM

## 2018-03-07 RX ORDER — SODIUM CHLORIDE 0.9 % (FLUSH) 0.9 %
5-10 SYRINGE (ML) INJECTION AS NEEDED
Status: DISCONTINUED | OUTPATIENT
Start: 2018-03-07 | End: 2018-03-07 | Stop reason: HOSPADM

## 2018-03-07 RX ORDER — SUCCINYLCHOLINE CHLORIDE 20 MG/ML
INJECTION INTRAMUSCULAR; INTRAVENOUS AS NEEDED
Status: DISCONTINUED | OUTPATIENT
Start: 2018-03-07 | End: 2018-03-07 | Stop reason: HOSPADM

## 2018-03-07 RX ORDER — FENTANYL CITRATE 50 UG/ML
INJECTION, SOLUTION INTRAMUSCULAR; INTRAVENOUS AS NEEDED
Status: DISCONTINUED | OUTPATIENT
Start: 2018-03-07 | End: 2018-03-07 | Stop reason: HOSPADM

## 2018-03-07 RX ORDER — ALPRAZOLAM 0.5 MG/1
1 TABLET ORAL
Status: DISCONTINUED | OUTPATIENT
Start: 2018-03-07 | End: 2018-03-11 | Stop reason: HOSPADM

## 2018-03-07 RX ORDER — SODIUM CHLORIDE 0.9 % (FLUSH) 0.9 %
5-10 SYRINGE (ML) INJECTION EVERY 8 HOURS
Status: DISCONTINUED | OUTPATIENT
Start: 2018-03-07 | End: 2018-03-07 | Stop reason: HOSPADM

## 2018-03-07 RX ORDER — SODIUM CHLORIDE, SODIUM LACTATE, POTASSIUM CHLORIDE, CALCIUM CHLORIDE 600; 310; 30; 20 MG/100ML; MG/100ML; MG/100ML; MG/100ML
125 INJECTION, SOLUTION INTRAVENOUS CONTINUOUS
Status: DISCONTINUED | OUTPATIENT
Start: 2018-03-07 | End: 2018-03-08

## 2018-03-07 RX ORDER — DEXAMETHASONE SODIUM PHOSPHATE 4 MG/ML
INJECTION, SOLUTION INTRA-ARTICULAR; INTRALESIONAL; INTRAMUSCULAR; INTRAVENOUS; SOFT TISSUE AS NEEDED
Status: DISCONTINUED | OUTPATIENT
Start: 2018-03-07 | End: 2018-03-07 | Stop reason: HOSPADM

## 2018-03-07 RX ORDER — LIDOCAINE HYDROCHLORIDE 20 MG/ML
INJECTION, SOLUTION EPIDURAL; INFILTRATION; INTRACAUDAL; PERINEURAL AS NEEDED
Status: DISCONTINUED | OUTPATIENT
Start: 2018-03-07 | End: 2018-03-07 | Stop reason: HOSPADM

## 2018-03-07 RX ORDER — HYDROMORPHONE HCL IN 0.9% NACL 15 MG/30ML
PATIENT CONTROLLED ANALGESIA VIAL INTRAVENOUS
Status: DISCONTINUED | OUTPATIENT
Start: 2018-03-07 | End: 2018-03-09

## 2018-03-07 RX ORDER — NEOSTIGMINE METHYLSULFATE 1 MG/ML
INJECTION INTRAVENOUS AS NEEDED
Status: DISCONTINUED | OUTPATIENT
Start: 2018-03-07 | End: 2018-03-07 | Stop reason: HOSPADM

## 2018-03-07 RX ORDER — SODIUM CHLORIDE 0.9 % (FLUSH) 0.9 %
5-10 SYRINGE (ML) INJECTION AS NEEDED
Status: DISCONTINUED | OUTPATIENT
Start: 2018-03-07 | End: 2018-03-11 | Stop reason: HOSPADM

## 2018-03-07 RX ORDER — CEFAZOLIN SODIUM/WATER 2 G/20 ML
2 SYRINGE (ML) INTRAVENOUS
Status: DISCONTINUED | OUTPATIENT
Start: 2018-03-07 | End: 2018-03-07

## 2018-03-07 RX ORDER — ONDANSETRON 2 MG/ML
INJECTION INTRAMUSCULAR; INTRAVENOUS AS NEEDED
Status: DISCONTINUED | OUTPATIENT
Start: 2018-03-07 | End: 2018-03-07 | Stop reason: HOSPADM

## 2018-03-07 RX ORDER — DIPHENHYDRAMINE HYDROCHLORIDE 50 MG/ML
12.5 INJECTION, SOLUTION INTRAMUSCULAR; INTRAVENOUS AS NEEDED
Status: DISCONTINUED | OUTPATIENT
Start: 2018-03-07 | End: 2018-03-07 | Stop reason: HOSPADM

## 2018-03-07 RX ORDER — MIDAZOLAM HYDROCHLORIDE 1 MG/ML
INJECTION, SOLUTION INTRAMUSCULAR; INTRAVENOUS AS NEEDED
Status: DISCONTINUED | OUTPATIENT
Start: 2018-03-07 | End: 2018-03-07 | Stop reason: HOSPADM

## 2018-03-07 RX ORDER — DIPHENHYDRAMINE HYDROCHLORIDE 50 MG/ML
12.5 INJECTION, SOLUTION INTRAMUSCULAR; INTRAVENOUS
Status: ACTIVE | OUTPATIENT
Start: 2018-03-07 | End: 2018-03-08

## 2018-03-07 RX ORDER — SODIUM CHLORIDE, SODIUM LACTATE, POTASSIUM CHLORIDE, CALCIUM CHLORIDE 600; 310; 30; 20 MG/100ML; MG/100ML; MG/100ML; MG/100ML
125 INJECTION, SOLUTION INTRAVENOUS CONTINUOUS
Status: DISCONTINUED | OUTPATIENT
Start: 2018-03-07 | End: 2018-03-07 | Stop reason: HOSPADM

## 2018-03-07 RX ORDER — LABETALOL HYDROCHLORIDE 5 MG/ML
10 INJECTION, SOLUTION INTRAVENOUS
Status: DISCONTINUED | OUTPATIENT
Start: 2018-03-07 | End: 2018-03-07 | Stop reason: HOSPADM

## 2018-03-07 RX ORDER — LABETALOL HYDROCHLORIDE 5 MG/ML
INJECTION, SOLUTION INTRAVENOUS AS NEEDED
Status: DISCONTINUED | OUTPATIENT
Start: 2018-03-07 | End: 2018-03-07 | Stop reason: HOSPADM

## 2018-03-07 RX ORDER — HYDROMORPHONE HYDROCHLORIDE 2 MG/ML
INJECTION, SOLUTION INTRAMUSCULAR; INTRAVENOUS; SUBCUTANEOUS AS NEEDED
Status: DISCONTINUED | OUTPATIENT
Start: 2018-03-07 | End: 2018-03-07 | Stop reason: HOSPADM

## 2018-03-07 RX ORDER — SODIUM CHLORIDE, SODIUM LACTATE, POTASSIUM CHLORIDE, CALCIUM CHLORIDE 600; 310; 30; 20 MG/100ML; MG/100ML; MG/100ML; MG/100ML
100 INJECTION, SOLUTION INTRAVENOUS CONTINUOUS
Status: DISCONTINUED | OUTPATIENT
Start: 2018-03-07 | End: 2018-03-07 | Stop reason: HOSPADM

## 2018-03-07 RX ORDER — SODIUM CHLORIDE 0.9 % (FLUSH) 0.9 %
5-10 SYRINGE (ML) INJECTION EVERY 8 HOURS
Status: DISCONTINUED | OUTPATIENT
Start: 2018-03-07 | End: 2018-03-11 | Stop reason: HOSPADM

## 2018-03-07 RX ORDER — GLYCOPYRROLATE 0.2 MG/ML
INJECTION INTRAMUSCULAR; INTRAVENOUS AS NEEDED
Status: DISCONTINUED | OUTPATIENT
Start: 2018-03-07 | End: 2018-03-07 | Stop reason: HOSPADM

## 2018-03-07 RX ORDER — EPHEDRINE SULFATE 50 MG/ML
INJECTION, SOLUTION INTRAVENOUS AS NEEDED
Status: DISCONTINUED | OUTPATIENT
Start: 2018-03-07 | End: 2018-03-07 | Stop reason: HOSPADM

## 2018-03-07 RX ORDER — HEPARIN SODIUM 5000 [USP'U]/ML
5000 INJECTION, SOLUTION INTRAVENOUS; SUBCUTANEOUS EVERY 8 HOURS
Status: DISCONTINUED | OUTPATIENT
Start: 2018-03-07 | End: 2018-03-07

## 2018-03-07 RX ORDER — FAMOTIDINE 10 MG/ML
INJECTION INTRAVENOUS AS NEEDED
Status: DISCONTINUED | OUTPATIENT
Start: 2018-03-07 | End: 2018-03-07 | Stop reason: HOSPADM

## 2018-03-07 RX ORDER — METOPROLOL SUCCINATE 50 MG/1
50 TABLET, EXTENDED RELEASE ORAL DAILY
Status: DISCONTINUED | OUTPATIENT
Start: 2018-03-07 | End: 2018-03-11 | Stop reason: HOSPADM

## 2018-03-07 RX ORDER — ONDANSETRON 2 MG/ML
4 INJECTION INTRAMUSCULAR; INTRAVENOUS
Status: DISCONTINUED | OUTPATIENT
Start: 2018-03-07 | End: 2018-03-11 | Stop reason: HOSPADM

## 2018-03-07 RX ORDER — SODIUM CHLORIDE 0.9 % (FLUSH) 0.9 %
5-10 SYRINGE (ML) INJECTION EVERY 8 HOURS
Status: DISCONTINUED | OUTPATIENT
Start: 2018-03-07 | End: 2018-03-08

## 2018-03-07 RX ORDER — SODIUM CHLORIDE, SODIUM LACTATE, POTASSIUM CHLORIDE, CALCIUM CHLORIDE 600; 310; 30; 20 MG/100ML; MG/100ML; MG/100ML; MG/100ML
INJECTION, SOLUTION INTRAVENOUS
Status: DISCONTINUED | OUTPATIENT
Start: 2018-03-07 | End: 2018-03-07 | Stop reason: HOSPADM

## 2018-03-07 RX ORDER — PROPOFOL 10 MG/ML
INJECTION, EMULSION INTRAVENOUS AS NEEDED
Status: DISCONTINUED | OUTPATIENT
Start: 2018-03-07 | End: 2018-03-07 | Stop reason: HOSPADM

## 2018-03-07 RX ORDER — AMLODIPINE BESYLATE 5 MG/1
10 TABLET ORAL DAILY
Status: DISCONTINUED | OUTPATIENT
Start: 2018-03-08 | End: 2018-03-11 | Stop reason: HOSPADM

## 2018-03-07 RX ORDER — HYDROMORPHONE HYDROCHLORIDE 1 MG/ML
.25-1 INJECTION, SOLUTION INTRAMUSCULAR; INTRAVENOUS; SUBCUTANEOUS
Status: DISCONTINUED | OUTPATIENT
Start: 2018-03-07 | End: 2018-03-07 | Stop reason: HOSPADM

## 2018-03-07 RX ADMIN — NEOSTIGMINE METHYLSULFATE 3 MG: 1 INJECTION INTRAVENOUS at 11:22

## 2018-03-07 RX ADMIN — EPHEDRINE SULFATE 10 MG: 50 INJECTION, SOLUTION INTRAVENOUS at 07:49

## 2018-03-07 RX ADMIN — SODIUM CHLORIDE, SODIUM LACTATE, POTASSIUM CHLORIDE, CALCIUM CHLORIDE: 600; 310; 30; 20 INJECTION, SOLUTION INTRAVENOUS at 10:39

## 2018-03-07 RX ADMIN — ONDANSETRON 4 MG: 2 INJECTION INTRAMUSCULAR; INTRAVENOUS at 11:10

## 2018-03-07 RX ADMIN — METOPROLOL SUCCINATE 50 MG: 50 TABLET, FILM COATED, EXTENDED RELEASE ORAL at 16:59

## 2018-03-07 RX ADMIN — LABETALOL HYDROCHLORIDE 10 MG: 5 INJECTION INTRAVENOUS at 12:07

## 2018-03-07 RX ADMIN — MIDAZOLAM HYDROCHLORIDE 2 MG: 1 INJECTION, SOLUTION INTRAMUSCULAR; INTRAVENOUS at 07:44

## 2018-03-07 RX ADMIN — GLYCOPYRROLATE 0.5 MG: 0.2 INJECTION INTRAMUSCULAR; INTRAVENOUS at 11:22

## 2018-03-07 RX ADMIN — LABETALOL HYDROCHLORIDE 2.5 MG: 5 INJECTION, SOLUTION INTRAVENOUS at 09:10

## 2018-03-07 RX ADMIN — FAMOTIDINE 20 MG: 10 INJECTION INTRAVENOUS at 08:08

## 2018-03-07 RX ADMIN — LIDOCAINE HYDROCHLORIDE 40 MG: 20 INJECTION, SOLUTION EPIDURAL; INFILTRATION; INTRACAUDAL; PERINEURAL at 07:46

## 2018-03-07 RX ADMIN — ROCURONIUM BROMIDE 20 MG: 10 INJECTION, SOLUTION INTRAVENOUS at 08:44

## 2018-03-07 RX ADMIN — PROPOFOL 200 MG: 10 INJECTION, EMULSION INTRAVENOUS at 07:46

## 2018-03-07 RX ADMIN — Medication: at 15:50

## 2018-03-07 RX ADMIN — HEPARIN SODIUM 5000 UNITS: 5000 INJECTION, SOLUTION INTRAVENOUS; SUBCUTANEOUS at 17:27

## 2018-03-07 RX ADMIN — FENTANYL CITRATE 50 MCG: 50 INJECTION, SOLUTION INTRAMUSCULAR; INTRAVENOUS at 08:38

## 2018-03-07 RX ADMIN — Medication 10 ML: at 14:07

## 2018-03-07 RX ADMIN — MIDAZOLAM HYDROCHLORIDE 3 MG: 1 INJECTION, SOLUTION INTRAMUSCULAR; INTRAVENOUS at 07:42

## 2018-03-07 RX ADMIN — CEFOXITIN SODIUM 2 G: 2 POWDER, FOR SOLUTION INTRAVENOUS at 07:58

## 2018-03-07 RX ADMIN — ONDANSETRON 4 MG: 2 INJECTION INTRAMUSCULAR; INTRAVENOUS at 07:46

## 2018-03-07 RX ADMIN — HYDROMORPHONE HYDROCHLORIDE 0.5 MG: 2 INJECTION, SOLUTION INTRAMUSCULAR; INTRAVENOUS; SUBCUTANEOUS at 11:41

## 2018-03-07 RX ADMIN — ALPRAZOLAM 1 MG: 0.5 TABLET ORAL at 23:16

## 2018-03-07 RX ADMIN — EPHEDRINE SULFATE 10 MG: 50 INJECTION, SOLUTION INTRAVENOUS at 07:53

## 2018-03-07 RX ADMIN — ROCURONIUM BROMIDE 5 MG: 10 INJECTION, SOLUTION INTRAVENOUS at 07:46

## 2018-03-07 RX ADMIN — FENTANYL CITRATE 50 MCG: 50 INJECTION, SOLUTION INTRAMUSCULAR; INTRAVENOUS at 08:41

## 2018-03-07 RX ADMIN — DEXAMETHASONE SODIUM PHOSPHATE 8 MG: 4 INJECTION, SOLUTION INTRA-ARTICULAR; INTRALESIONAL; INTRAMUSCULAR; INTRAVENOUS; SOFT TISSUE at 07:46

## 2018-03-07 RX ADMIN — ROCURONIUM BROMIDE 45 MG: 10 INJECTION, SOLUTION INTRAVENOUS at 08:00

## 2018-03-07 RX ADMIN — SODIUM CHLORIDE, SODIUM LACTATE, POTASSIUM CHLORIDE, AND CALCIUM CHLORIDE 125 ML/HR: 600; 310; 30; 20 INJECTION, SOLUTION INTRAVENOUS at 17:28

## 2018-03-07 RX ADMIN — SODIUM CHLORIDE, SODIUM LACTATE, POTASSIUM CHLORIDE, AND CALCIUM CHLORIDE 125 ML/HR: 600; 310; 30; 20 INJECTION, SOLUTION INTRAVENOUS at 12:02

## 2018-03-07 RX ADMIN — SUCCINYLCHOLINE CHLORIDE 100 MG: 20 INJECTION INTRAMUSCULAR; INTRAVENOUS at 07:46

## 2018-03-07 RX ADMIN — ROCURONIUM BROMIDE 10 MG: 10 INJECTION, SOLUTION INTRAVENOUS at 10:32

## 2018-03-07 RX ADMIN — SODIUM CHLORIDE, SODIUM LACTATE, POTASSIUM CHLORIDE, AND CALCIUM CHLORIDE 100 ML/HR: 600; 310; 30; 20 INJECTION, SOLUTION INTRAVENOUS at 06:18

## 2018-03-07 RX ADMIN — SODIUM CHLORIDE, SODIUM LACTATE, POTASSIUM CHLORIDE, AND CALCIUM CHLORIDE: 600; 310; 30; 20 INJECTION, SOLUTION INTRAVENOUS at 09:54

## 2018-03-07 RX ADMIN — FENTANYL CITRATE 50 MCG: 50 INJECTION, SOLUTION INTRAMUSCULAR; INTRAVENOUS at 10:19

## 2018-03-07 RX ADMIN — SODIUM CHLORIDE, SODIUM LACTATE, POTASSIUM CHLORIDE, CALCIUM CHLORIDE: 600; 310; 30; 20 INJECTION, SOLUTION INTRAVENOUS at 07:50

## 2018-03-07 RX ADMIN — FENTANYL CITRATE 250 MCG: 50 INJECTION, SOLUTION INTRAMUSCULAR; INTRAVENOUS at 07:46

## 2018-03-07 RX ADMIN — Medication: at 12:11

## 2018-03-07 RX ADMIN — CEFOXITIN SODIUM 2 G: 2 POWDER, FOR SOLUTION INTRAVENOUS at 11:41

## 2018-03-07 NOTE — CONSULTS
Consult History and Physical Exam    NAME:  Niall Gilliam   :   1950   MRN:  [id number]     PCP:  Sabine Dozier MD   Referring: Stella Allen MD     Date/Time:  3/7/2018           Assessment/Plan:       HTN:  BP elevated post op but now improved. -- resume Toprol and amlodipine   -- will hold clonidine tonight    Anxiety:   -- hold QHS xanax while on PCA    Hepatitis C:  Minimally elevated AST. PLTS wnl.   -- resume outpatient follow up    Sigmoid diverticulitis (3/7/2018):  Post op care per surgeon. Subjective:   REASON FOR CONSULT: HTN    CHIEF COMPLAINT: Abd pain    HISTORY OF PRESENT ILLNESS:     Mr. Bobbi Bland is a 79 y.o.  male who is admitted for laparascopic sigmoid colectomy. Mr. Bobbi Bland today complains of abdominal pain at surgical sites. Pain better with use of PCA. Pain worse with movement. No n/v. No fever, chills. Denies sob or chest pains. Took AM BP meds today at 5 AM.  Reports bp usually well controlled. Past Medical History:   Diagnosis Date    Diverticulitis 2018    Hepatitis C 1992    Hypertension     Pancreatitis 2016        Past Surgical History:   Procedure Laterality Date    COLONOSCOPY N/A 2018    COLONOSCOPY performed by Ronald Amanda MD at [address] [place] [address]       Social History   Substance Use Topics    Smoking status: Never Smoker    Smokeless tobacco: Never Used    Alcohol use No        Family History   Problem Relation Age of Onset    Cancer Father         No Known Allergies     Prior to Admission medications    Medication Sig Start Date End Date Taking? Authorizing Provider   ALPRAZolam Venia Retort) 1 mg tablet Take 1 mg by mouth nightly as needed for Anxiety. Yes Historical Provider   metoprolol succinate (TOPROL-XL) 50 mg XL tablet Take 50 mg by mouth daily. Takes at 5 pm  Indications: hypertension   Yes Historical Provider   amLODIPine (NORVASC) 10 mg tablet Take 10 mg by mouth daily. Indications: hypertension   Yes Historical Provider   cloNIDine HCl (CATAPRES) 0.2 mg tablet Take 0.2 mg by mouth nightly. Indications: hypertension   Yes Historical Provider   cyanocobalamin (VITAMIN B12) 500 mcg tablet Take 500 mcg by mouth daily. Yes Historical Provider   multivitamin with iron (DAILY MULTI-VITAMINS/IRON) tablet Take 1 Tab by mouth daily. Historical Provider   ascorbic acid, vitamin C, (VITAMIN C) 500 mg tablet Take 500 mg by mouth daily. Historical Provider   b complex vitamins tablet Take 1 Tab by mouth daily. Historical Provider   cholecalciferol (VITAMIN D3) 1,000 unit tablet Take 1,000 Units by mouth daily. Historical Provider   traMADol (ULTRAM) 50 mg tablet Take 1 Tab by mouth every six (6) hours as needed. Max Daily Amount: 200 mg. Indications: Pain 1/22/18   DRU Dejesus         Review of Systems:  (bold if positive, if negative)    Gen:  Eyes:  ENT:  CVS:  Pulm:  GI:  Abdominal pain  :    MS:  Skin:  Hem:  Renal:    Neuro:            Objective:      VITALS:    Vital signs reviewed; most recent are:    Visit Vitals    /87 (BP 1 Location: Left arm, BP Patient Position: At rest)    Pulse 83    Temp 97.7 °F (36.5 °C)    Resp 12    Ht 5' 9\" (1.753 m)    Wt 66.1 kg (145 lb 11.6 oz)    SpO2 99%    BMI 21.52 kg/m2     SpO2 Readings from Last 6 Encounters:   03/07/18 99%   03/05/18 97%   02/23/18 100%   01/22/18 96%   11/18/15 96%   07/26/15 96%    O2 Flow Rate (L/min): 2 l/min     Intake/Output Summary (Last 24 hours) at 03/07/18 1344  Last data filed at 03/07/18 1149   Gross per 24 hour   Intake             3000 ml   Output              150 ml   Net             2850 ml            Exam:     Physical Exam:    Gen:  Well-developed, well-nourished, in no acute distress  HEENT:  Pink conjunctivae, hearing intact to voice, moist mucous membranes  Neck:  Supple  Resp:  No accessory muscle use, clear breath sounds without wheezes rales or rhonchi  Card:   No murmurs, normal S1, S2 without peripheral edema  Abd:  Soft, mild distended  Musc:  No cyanosis or clubbing  Skin:  No rashes or ulcers  Neuro:  Cranial nerves 3-12 are grossly intact,  strength is 5/5 bilaterally, dorsi / plantarflexion strength is 5/5 bilaterally, follows commands appropriately  Psych:  Alert with good insight. Oriented to person, place, and time       Labs:    Recent Labs      03/05/18   1147   WBC  7.0   HGB  14.2   HCT  42.0   PLT  195     Recent Labs      03/05/18   1147   NA  143   K  4.3   CL  103   CO2  33*   GLU  72   BUN  15   CREA  0.70   CA  8.9   ALB  3.8   TBILI  0.4   SGOT  42*   ALT  69     Lab Results   Component Value Date/Time    Glucose (POC) 101 (H) 01/20/2018 05:46 PM       No results for input(s): INR in the last 72 hours. No lab exists for component: INREXT      Reviewed prior medical records in preparation for this consult: Old medical records.     Surrogate decision maker:  spouse    Total time spent with patient: [phone] [facility] discussed with: Patient    Discussed:  Care Plan  ___________________________________________________    Attending Physician: Mary Adams MD

## 2018-03-07 NOTE — BRIEF OP NOTE
BRIEF OPERATIVE NOTE    Date of Procedure: 3/7/2018   Preoperative Diagnosis: DIVERTICULITIS SIGMOID COLON   Postoperative Diagnosis: DIVERTICULITIS SIGMOID COLON     Procedure(s):  LAPAROSCOPIC SIGMOID COLECTOMY  Surgeon(s) and Role:  Panel 1:     * Amy Griggs MD - Primary    Panel 2:     * Asher Baugh MD - Primary         Assistant Staff: None      Surgical Staff:  Circ-1: Luana Ochoa RN  Circ-Relief: Glenn Yancey RN  Scrub Tech-1: Linda Shannon  Scrub Tech-Relief: Tressia Shone  Surg Asst-1: Angie Hutchison  Surg Asst-Relief: Atilio Bautista  Event Time In   Incision Start 0802   Incision Close      Anesthesia: General   Estimated Blood Loss: 50 cc  Specimens:   ID Type Source Tests Collected by Time Destination   1 : sigmoid colon and anastomotic rings Preservative Colon  Amy Griggs MD 3/7/2018 1007 Pathology      Findings: Inflamed thickened distal sigmoid colon with multiple diverticula. No bubbles on anastomotic leak test. Anastomotic donuts intact.   Complications: None  Implants: * No implants in log *

## 2018-03-07 NOTE — IP AVS SNAPSHOT
[address] [facility] 
 
 
 [address] 
[phone] Patient: Marii Falcon MRN: [id number] ALEJANDRA:4/26/1906 About your hospitalization You were admitted on:  March 7, 2018 You last received care in the:  [facility] 4M POST SURG ORT 2 You were discharged on:  March 11, 2018 Why you were hospitalized Your primary diagnosis was:  Not on File Your diagnoses also included:  Sigmoid Diverticulitis Follow-up Information Follow up With Details Comments Contact Info Luma Peters MD Schedule an appointment as soon as possible for a visit in 2 weeks  [address] 
[phone] Maxwell Mcelroy MD   [address] 
[phone] Discharge Orders None A check keyon indicates which time of day the medication should be taken. My Medications START taking these medications Instructions Each Dose to Equal  
 Morning Noon Evening Bedtime * oxyCODONE-acetaminophen 5-325 mg per tablet Commonly known as:  PERCOCET Your last dose was: Your next dose is: Take 1-2 Tabs by mouth every four (4) hours as needed for Pain. Max Daily Amount: 12 Tabs. 1-2 Tab * oxyCODONE-acetaminophen 7.5-325 mg per tablet Commonly known as:  PERCOCET 7.5 Your last dose was: Your next dose is: Take 1 Tab by mouth every four (4) hours as needed for Pain. Max Daily Amount: 6 Tabs. 1 Tab * Notice: This list has 2 medication(s) that are the same as other medications prescribed for you. Read the directions carefully, and ask your doctor or other care provider to review them with you. CONTINUE taking these medications Instructions Each Dose to Equal  
 Morning Noon Evening Bedtime  
 amLODIPine 10 mg tablet Commonly known as:  Rachelle Murilol Your last dose was: Your next dose is: Take 10 mg by mouth daily. Indications: hypertension 10 mg  
    
   
   
   
  
 b complex vitamins tablet Your last dose was: Your next dose is: Take 1 Tab by mouth daily. 1 Tab  
    
   
   
   
  
 cloNIDine HCl 0.2 mg tablet Commonly known as:  CATAPRES Your last dose was: Your next dose is: Take 0.2 mg by mouth nightly. Indications: hypertension  
 0.2 mg  
    
   
   
   
  
 cyanocobalamin 500 mcg tablet Commonly known as:  VITAMIN B12 Your last dose was: Your next dose is: Take 500 mcg by mouth daily. 500 mcg DAILY MULTI-VITAMINS/IRON tablet Generic drug:  multivitamin with iron Your last dose was: Your next dose is: Take 1 Tab by mouth daily. 1 Tab  
    
   
   
   
  
 metoprolol succinate 50 mg XL tablet Commonly known as:  TOPROL-XL Your last dose was: Your next dose is: Take 50 mg by mouth daily. Takes at 5 pm  Indications: hypertension 50 mg  
    
   
   
   
  
 traMADol 50 mg tablet Commonly known as:  ULTRAM  
   
Your last dose was: Your next dose is: Take 1 Tab by mouth every six (6) hours as needed. Max Daily Amount: 200 mg. Indications: Pain 50 mg  
    
   
   
   
  
 VITAMIN C 500 mg tablet Generic drug:  ascorbic acid (vitamin C) Your last dose was: Your next dose is: Take 500 mg by mouth daily. 500 mg  
    
   
   
   
  
 VITAMIN D3 1,000 unit tablet Generic drug:  cholecalciferol Your last dose was: Your next dose is: Take 1,000 Units by mouth daily. 1000 Units XANAX 1 mg tablet Generic drug:  ALPRAZolam  
   
Your last dose was: Your next dose is: Take 1 mg by mouth nightly as needed for Anxiety. 1 mg Where to Get Your Medications Information on where to get these meds will be given to you by the nurse or doctor. ! Ask your nurse or doctor about these medications  
  oxyCODONE-acetaminophen 5-325 mg per tablet  
 oxyCODONE-acetaminophen 7.5-325 mg per tablet Discharge Instructions Patient Discharge Instructions Julissa Fortune / [id number] : 1950 Admitted 3/7/2018 Discharged: 3/8/2018 Take Home Medications · It is important that you take the medication exactly as they are prescribed. · Keep your medication in the bottles provided by the pharmacist and keep a list of the medication names, dosages, and times to be taken in your wallet. · Do not take other medications without consulting your doctor. · Do not drive, drink alcohol, or operate machinery when taking sedating pain medication. · Take colace daily while on pain medication to help prevent constipation. You may take over the counter laxatives such as Dulcolax or Miralax as needed for constipation What to do at [facility] Recommended diet: Regular Recommended activity: No heavy lifting or strenuous activity for 6 weeks. You may shower. You may drive once pain has improved and you no longer require pain medication. Follow up with Dr. Toni Saldaña in 2 weeks. Call [facility] to make an appointment. Call Dr. Toni Saldaña or go to the ER if you develop worsening pain, fever, vomiting, or any other symptoms that concern you. Providers Seen During Your Hospitalization Provider Specialty Primary office phone Dawna Ordoñez MD General Surgery [phone] Your Primary Care Physician (PCP) Primary Care Physician Office Phone Office Fax Minta Market, [address] Loni Clay Jr [facility] [phone] You are allergic to the following No active allergies Recent Documentation Height Weight BMI Smoking Status 1.753 m 66.1 kg 21.52 kg/m2 Never Smoker Emergency Contacts Name Discharge Info Relation Home Work Mobile Nick Ramirez DISCHARGE CAREGIVER [3] Spouse [3] [phone] Patient Belongings The following personal items are in your possession at time of discharge: 
  Dental Appliances: None  Visual Aid: Glasses, With patient      Home Medications: None   Jewelry: None  Clothing:  (Clothing to PACU)    Other Valuables: None Discharge Instructions Attachments/References BOWEL RESECTION: LAPAROSCOPIC: POST-OP (ENGLISH) Patient Handouts Laparoscopic Bowel Resection: What to Expect at Home Your Recovery You have had part of your small or large intestine taken out. You are likely to have pain that comes and goes for the next few days. You may feel like you have the flu. You also may have a low fever and feel tired and nauseated. This is common. You should feel better after 1 to 2 weeks and will probably be back to normal in 2 to 4 weeks. Your bowel movements may not be regular for several weeks. Also, you may have some blood in your stool. This care sheet gives you a general idea about how long it will take for you to recover. But each person recovers at a different pace. Follow the steps below to get better as quickly as possible. How can you care for yourself at home? Activity ? · Rest when you feel tired. Getting enough sleep will help you recover. ? · Try to walk each day. Start by walking a little more than you did the day before. Bit by bit, increase the amount you walk. Walking boosts blood flow and helps prevent pneumonia and constipation. ? · Avoid strenuous activities, such as biking, jogging, weight lifting, or aerobic exercise, until your doctor says it is okay. ? · Avoid lifting anything that would make you strain.  This may include heavy grocery bags and milk containers, a heavy briefcase or backpack, cat litter or dog food bags, a vacuum , or a child. ? · Ask your doctor when you can drive again. ? · You will probably need to take 2 to 4 weeks off from work. It depends on the type of work you do and how you feel. ? · You may shower 24 to 48 hours after surgery, if your doctor says it is okay. Pat the cut (incision) dry. Do not take a bath for the first 2 weeks, or until your doctor tells you it is okay. ? · Ask your doctor when it is okay for you to have sex. Diet ? · You may not have much appetite after the surgery. But try to eat a healthy diet. Your doctor will tell you about any foods you should not eat. ? · Eat a low-fiber diet for several weeks after surgery. Eat many small meals throughout the day. Add high-fiber foods a little at a time. ? · Eat yogurt. It puts good bacteria into your colon and helps prevent diarrhea. ? · Try to avoid nuts, seeds, and corn for a while. They may be hard to digest.  
? · You may need to take vitamins that contain sodium and potassium. Your doctor will tell you whether you should take any vitamins or supplements. ? · Drink plenty of fluids (enough so that your urine is light yellow or clear like water) to prevent dehydration. Choose water and other caffeine-free clear liquids until you feel better. If you have kidney, heart, or liver disease and have to limit fluids, talk with your doctor before you increase the amount of fluids you drink. Medicines ? · Your doctor will tell you if and when you can restart your medicines. He or she will also give you instructions about taking any new medicines. ? · If you take blood thinners, such as warfarin (Coumadin), clopidogrel (Plavix), or aspirin, be sure to talk to your doctor. He or she will tell you if and when to start taking those medicines again. Make sure that you understand exactly what your doctor wants you to do. ? · Take pain medicines exactly as directed. ¨ If the doctor gave you a prescription medicine for pain, take it as prescribed. ¨ If you are not taking a prescription pain medicine, ask your doctor if you can take an over-the-counter medicine. ? · If your doctor prescribed antibiotics, take them as directed. Do not stop taking them just because you feel better. You need to take the full course of antibiotics. ? · You may need to take some medicines in a different form. You will be told whether to crush pills or take a liquid form of the medicine. ? · If your doctor gives you a stool softener, take it as directed. Incision care ? · If you have strips of tape on the incisions, leave the tape on for a week or until it falls off.  
? · Wash the area daily with warm, soapy water and pat it dry. Don't use hydrogen peroxide or alcohol, which can slow healing. You may cover the area with a gauze bandage if it weeps or rubs against clothing. Change the bandage every day. Follow-up care is a key part of your treatment and safety. Be sure to make and go to all appointments, and call your doctor if you are having problems. It's also a good idea to know your test results and keep a list of the medicines you take. When should you call for help? Call 911 anytime you think you may need emergency care. For example, call if: 
? · You passed out (lost consciousness). ? · You are short of breath. ?Call your doctor now or seek immediate medical care if: 
? · You have pain that does not get better after you take pain medicine. ? · You cannot pass stool or gas. ? · You are sick to your stomach and cannot keep fluids down. ? · Bright red blood has soaked through your bandage. ? · You have loose stitches, or your incision comes open. ? · You have signs of a blood clot in your leg (called a deep vein thrombosis), such as: 
¨ Pain in your calf, back of the knee, thigh, or groin. ¨ Redness and swelling in your leg or groin. ? · You have signs of infection, such as: 
¨ Increased pain, swelling, warmth, or redness. ¨ Red streaks leading from the incision. ¨ Pus draining from the incision. ¨ A fever. ? Watch closely for any changes in your health, and be sure to contact your doctor if you have any problems. Where can you learn more? Go to http://bettie-den.info/. Enter H191 in the search box to learn more about \"Laparoscopic Bowel Resection: What to Expect at Home. \" Current as of: May 12, 2017 Content Version: 11.4 © 2850-5355 [employer]. Care instructions adapted under license by [employer] (which disclaims liability or warranty for this information). If you have questions about a medical condition or this instruction, always ask your healthcare professional. [address] any warranty or liability for your use of this information. Please provide this summary of care documentation to your next provider. Signatures-by signing, you are acknowledging that this After Visit Summary has been reviewed with you and you have received a copy. Patient Signature:  ____________________________________________________________ Date:  ____________________________________________________________  
  
Bella Tenorio Provider Signature:  ____________________________________________________________ Date:  ____________________________________________________________

## 2018-03-07 NOTE — PROGRESS NOTES
Received patient as admission to room 431 from PACU. Patient drowsy, responds to voice. Continuous pulse ox placed on for monitoring . Oriented patient to room and call bell system. Bed alarm set. Primary Nurse Suresh Oneill RN and Bere San RN performed a dual skin assessment on this patient No impairment noted aside from surgical incision sites.   Leandro score is 20

## 2018-03-07 NOTE — OP NOTES
[address] REPORT    [facility].  MR#: [id number]  : 1950  ACCOUNT #: [de-identified]   DATE OF SERVICE: 2018    PREOPERATIVE DIAGNOSIS:  Recurrent diverticulitis. POSTOPERATIVE DIAGNOSIS:  Recurrent diverticulitis. PROCEDURE PERFORMED:  Rigid cystourethroscopy with bilateral ureteral catheter placement. OPERATIVE SURGEON:  Naye Mandujano MD.     ASSISTANT:  none    ANESTHESIA:  General.    IMPLANTS:  none    PROCEDURE FINDINGS:    1. Normal urethra and bladder. 2  Bilateral 5 Spanish open-ended catheter was placed to 25 cm without difficulty. INDICATION FOR PROCEDURE:  A 60-year-old male who is undergoing laparoscopic sigmoidectomy for recurrent diverticulitis and request was made for preoperative ureteral stent placement for intraoperative identification. We discussed the procedure with the patient, he agreed to proceed. PROCEDURE IN DETAIL:  After informed consent was obtained, the patient was taken to the operating room. Timeout was performed. Perioperative antibiotics were dosed per the colorectal surgeon and he was prepped and draped in the dorsal lithotomy position. We began the case with a 21 Spanish rigid cystoscope which was advanced in the bladder with the aforementioned findings. Full cystoscopy was performed which showed no appreciable mucosal abnormalities concerning for possible fistula. We then passed the 5 Western Amanda open-ended catheters over a wire into the bilateral ureteral orifices. These were placed without any resistance up to 25 cm and then the scope was removed and we placed a Crandall catheter securing the ureteral catheters to this and the patient was handed over to colorectal surgery for their portion of the procedure. COMPLICATIONS:  None. SPECIMENS REMOVED:  None. TUBES AND DRAINS:    1.  Bilateral 5-Spanish ureteral catheters. 2.  Crandall catheter. ESTIMATED BLOOD LOSS:  None.     FLUIDS:  See anesthesia record. URINE OUTPUT:  Not recordable. DISPOSITION:  The patient can have his catheters removed at the end of the case if there were no concerns for ureteral injury. Otherwise, please contact urology for intraoperative assistance.       Rajni Sage       [facility] / [place]  D: 03/07/2018 09:02     T: 03/07/2018 09:45  JOB #: [id number]

## 2018-03-07 NOTE — ANESTHESIA POSTPROCEDURE EVALUATION
Post-Anesthesia Evaluation and Assessment    Patient: Ricardo Narayan MRN: [id number]  SSN: [id number]    YOB: 1950  Age: 79 y.o. Sex: male       Cardiovascular Function/Vital Signs  Visit Vitals    /83    Pulse 78    Temp 36.6 °C (97.8 °F)    Resp 13    Ht 5' 9\" (1.753 m)    Wt 66.1 kg (145 lb 11.6 oz)    SpO2 98%    BMI 21.52 kg/m2       Patient is status post general anesthesia for Procedure(s):  LAPAROSCOPIC SIGMOID COLECTOMY POSSIBLE OPEN  CYSTOSCOPY URETERAL STENT INSERTION BILATERAL. Nausea/Vomiting: None    Postoperative hydration reviewed and adequate. Pain:  Pain Scale 1: Numeric (0 - 10) (03/07/18 1206)  Pain Intensity 1: 0 (03/07/18 1206)   Managed    Neurological Status:   Neuro (WDL): Within Defined Limits (03/07/18 1206)   At baseline    Mental Status and Level of Consciousness: Arousable    Pulmonary Status:   O2 Device: Nasal cannula (03/07/18 1206)   Adequate oxygenation and airway patent    Complications related to anesthesia: None    Post-anesthesia assessment completed.  No concerns    Signed By: Myles Mckinney MD     March 7, 2018

## 2018-03-07 NOTE — H&P
Assessment:     Recurrent sigmoid diverticulitis    Plan:     Consent for : laparoscopic sigmoid colectomy possible open      Signed By: Arnoldo Sena MD  [facility]  Office:  [phone]    March 7, 2018          General Surgery H&P    Subjective: The patient is a 79 y.o. male who presents for elective laparoscopic sigmoid colectomy for recurrent diverticulitis. He underwent colonoscopy prior to surgery that was negative for malignancy. There have been no changes in his health since his clinic appointment. Past Medical History:   Diagnosis Date    Diverticulitis 2018    Hepatitis C 1992    Hypertension     Pancreatitis 2016         Past Surgical History:   Procedure Laterality Date    COLONOSCOPY N/A 2/23/2018    COLONOSCOPY performed by Francisco Javier Goddard MD at [address] [place] [address]      History reviewed. No pertinent family history.   Social History     Social History    Marital status:      Spouse name: N/A    Number of children: N/A    Years of education: N/A     Social History Main Topics    Smoking status: Never Smoker    Smokeless tobacco: Never Used    Alcohol use No    Drug use: Yes     Special: Marijuana      Comment: 1 month ago recreational    Sexual activity: Not Asked     Other Topics Concern    None     Social History Narrative      Current Facility-Administered Medications   Medication Dose Route Frequency    lidocaine (PF) (XYLOCAINE) 10 mg/mL (1 %) injection 0.1 mL  0.1 mL SubCUTAneous PRN    lactated Ringers infusion  100 mL/hr IntraVENous CONTINUOUS    sodium chloride (NS) flush 5-10 mL  5-10 mL IntraVENous Q8H    sodium chloride (NS) flush 5-10 mL  5-10 mL IntraVENous PRN    sodium chloride (NS) flush 5-10 mL  5-10 mL IntraVENous Q8H    sodium chloride (NS) flush 5-10 mL  5-10 mL IntraVENous PRN    lidocaine (PF) (XYLOCAINE) 10 mg/mL (1 %) injection 0.1 mL  0.1 mL SubCUTAneous PRN    ceFAZolin (ANCEF) 2 g/20 mL in sterile water IV syringe  2 g IntraVENous ON CALL TO OR    cefOXitin (MEFOXIN) 2 g in 0.9% sodium chloride 50 mL  2 g IntraVENous ONCE      No Known Allergies    Review of Systems:     []     Unable to obtain  ROS due to  []    mental status change  []    sedated   []    intubated   [x]    Total of 12 system negative, unless specified below or in HPI:  Constitutional: negative fever, negative chills, negative weight loss  Eyes:   negative visual changes  ENT:   negative sore throat, tongue or lip swelling  Respiratory:  negative cough, negative dyspnea  Cards:  negative for chest pain, palpitations, lower extremity edema  GI:   negative for nausea, vomiting, diarrhea, and abdominal pain  :  negative for frequency, dysuria  Integument:  negative for rash and pruritus  Heme:  negative for easy bruising and gum/nose bleeding  Musculoskel: negative for myalgias,  back pain and muscle weakness  Neuro:  negative for headaches, dizziness, vertigo  Psych:  negative for feelings of anxiety, depression     Objective:      Patient Vitals for the past 8 hrs:   BP Temp Pulse Resp SpO2 Height Weight   18 0615 112/79 98 °F (36.7 °C) 69 18 97 % 5' 9\" (1.753 m) 66.1 kg (145 lb 11.6 oz)       Temp (24hrs), Av °F (36.7 °C), Min:98 °F (36.7 °C), Max:98 °F (36.7 °C)      Physical Exam:  General:  Alert, cooperative, no distress, appears stated age. Eyes:  Conjunctivae/corneas clear. PERRL, EOMs intact. Nose: Nares normal. Septum midline. Mucosa normal. No drainage or sinus tenderness. Mouth/Throat: Lips, mucosa, and tongue normal. Teeth and gums normal.   Neck: Supple, symmetrical, trachea midline, no adenopathy, thyroid: no enlargment/tenderness/nodules, no carotid bruit and no JVD. Back:   Symmetric, no curvature. ROM normal. No CVA tenderness. Lungs:   Clear to auscultation bilaterally. Heart:  Regular rate and rhythm, S1, S2 normal, no murmur, click, rub or gallop. Abdomen:   Soft, non-tender.  Bowel sounds normal. No masses,  No organomegaly. Extremities: Extremities normal, atraumatic, no cyanosis or edema. Pulses: 2+ and symmetric all extremities. Skin: Skin color, texture, turgor normal. No rashes or lesions   Lymph nodes: Cervical, supraclavicular, and axillary nodes normal.     Labs: Recent Labs      03/05/18   1147   WBC  7.0   HGB  14.2   HCT  42.0   PLT  195     Recent Labs      03/05/18   1147   NA  143   K  4.3   CL  103   CO2  33*   GLU  72   BUN  15   CREA  0.70   CA  8.9   ALB  3.8   TBILI  0.4   SGOT  42*   ALT  69     No results for input(s): INR in the last 72 hours.     No lab exists for component: INREXT

## 2018-03-07 NOTE — PERIOP NOTES
TRANSFER - OUT REPORT:    Verbal report given to [facility] AT EDUARDO WORLEY RN(name) on Leann Battles  being transferred to [facility](unit) for routine post - op       Report consisted of patients Situation, Background, Assessment and   Recommendations(SBAR). Information from the following report(s) SBAR, Kardex and MAR and incision site was reviewed with the receiving nurse. Lines:   Peripheral IV 03/07/18 Left Forearm (Active)   Site Assessment Clean, dry, & intact 3/7/2018 12:00 PM   Phlebitis Assessment 0 3/7/2018 12:00 PM   Infiltration Assessment 0 3/7/2018 12:00 PM   Dressing Status Clean, dry, & intact 3/7/2018 12:00 PM   Dressing Type Transparent 3/7/2018 12:00 PM   Hub Color/Line Status Pink;Patent 3/7/2018 12:00 PM   Action Taken Open ports on tubing capped 3/7/2018  6:16 AM   Alcohol Cap Used Yes 3/7/2018  6:16 AM       Peripheral IV Left Hand (Active)   Site Assessment Clean, dry, & intact 3/7/2018 12:00 PM   Phlebitis Assessment 0 3/7/2018 12:00 PM   Infiltration Assessment 0 3/7/2018 12:00 PM   Dressing Status Clean, dry, & intact 3/7/2018 12:00 PM   Dressing Type Transparent 3/7/2018 12:00 PM   Hub Color/Line Status Matthew Fearing; Infusing;Patent 3/7/2018 12:00 PM        Opportunity for questions and clarification was provided.       Patient transported with:   O2 @ 3 liters  Registered Nurse

## 2018-03-07 NOTE — PROGRESS NOTES
Patient concerned, states he \"needs to stand up to urinate\". Explained to patient that he has ram cath inserted, showed patient ram bag which has approximately 600 cc of pink colored urine present. No kinks noted in ram tubing.

## 2018-03-07 NOTE — PROGRESS NOTES
Problem: Mobility Impaired (Adult and Pediatric)  Goal: *Acute Goals and Plan of Care (Insert Text)  Physical Therapy Goals  Initiated 3/7/2018  1. Patient will move from supine to sit and sit to supine , scoot up and down and roll side to side in bed with independence within 7 day(s). 2.  Patient will transfer from bed to chair and chair to bed with independence using the least restrictive device within 7 day(s). 3.  Patient will perform sit to stand with independence within 7 day(s). 4.  Patient will ambulate with independence for 200 feet with the least restrictive device within 7 day(s). 5.  Patient will ascend/descend 4 stairs with  handrail(s) with independence within 7 day(s). physical Therapy EVALUATION  Patient: Marii Falcon (89 y.o. male)  Date: 3/7/2018  Primary Diagnosis: DIVERTICULITIS SIGMOID COLON   Sigmoid diverticulitis  Procedure(s) (LRB):  LAPAROSCOPIC SIGMOID COLECTOMY POSSIBLE OPEN (N/A)  CYSTOSCOPY URETERAL STENT INSERTION BILATERAL (Bilateral) Day of Surgery   Precautions: fall       ASSESSMENT :  Based on the objective data described below, the patient presents with generalized and debility. Sit on the edge of bed with CGA, sit to stand CGA, ambulate without assistive device to the recliner CGA, no loss of balance, OOB to chair as tolerated performed some active range of motion exercise on both LE all planes. Educate and  Instructed patient to continue active range of motion exercise on both legs while up on chair or on bed. Notified nurse marjorie greed to monitor and assist back to bed when ask. Documentation for home O2:     ROOM AIR    AT REST   O2 SATS  100% HR  70   ROOM AIR WITH ACTIVITY 02 SATS  97% HR  75   ROOM AIR PATIENT LEFT COMFORTABLY  SITTING/SUPINE O2 SATS  99% HR  75       Patient will benefit from skilled intervention to address the above impairments.   Patients rehabilitation potential is considered to be Excellent  Factors which may influence rehabilitation potential include:   [x]         None noted  []         Mental ability/status  []         Medical condition  []         Home/family situation and support systems  []         Safety awareness  []         Pain tolerance/management  []         Other:      PLAN :  Recommendations and Planned Interventions:  [x]           Bed Mobility Training             []    Neuromuscular Re-Education  [x]           Transfer Training                   []    Orthotic/Prosthetic Training  [x]           Gait Training                         []    Modalities  [x]           Therapeutic Exercises           []    Edema Management/Control  [x]           Therapeutic Activities            []    Patient and Family Training/Education  []           Other (comment):    Frequency/Duration: Patient will be followed by physical therapy  5 times a week to address goals. Discharge Recommendations: None  Further Equipment Recommendations for Discharge: none     SUBJECTIVE:   Patient stated I feel fine to sit up on the chair.     OBJECTIVE DATA SUMMARY:   HISTORY:    Past Medical History:   Diagnosis Date    Diverticulitis 2018    Hepatitis C 1992    Hypertension     Pancreatitis 2016     Past Surgical History:   Procedure Laterality Date    COLONOSCOPY N/A 2/23/2018    COLONOSCOPY performed by King Bunn MD at [address] [place] [address]     Prior Level of Function/Home Situation: Independent community ambulator without assistive device.   Personal factors and/or comorbidities impacting plan of care:     Home Situation  Home Environment: Private residence  # Steps to Enter: 4  One/Two Story Residence: Two story  Living Alone: No  Patient Expects to be Discharged to[de-identified] Private residence  Current DME Used/Available at Home: None    EXAMINATION/PRESENTATION/DECISION MAKING:   Critical Behavior:              Hearing:    Range Of Motion:  AROM: Within functional limits           PROM: Within functional limits           Strength: Strength: Within functional limits                    Tone & Sensation:                                  Coordination:  Coordination: Within functional limits  Vision:      Functional Mobility:  Bed Mobility:  Rolling: Contact guard assistance  Supine to Sit: Contact guard assistance  Sit to Supine: Contact guard assistance  Scooting: Contact guard assistance  Transfers:  Sit to Stand: Contact guard assistance  Stand to Sit: Contact guard assistance  Stand Pivot Transfers: Contact guard assistance     Bed to Chair: Contact guard assistance              Balance:   Sitting: Intact  Standing: Intact; Without support  Ambulation/Gait Training:  Distance (ft): 10 Feet (ft)     Ambulation - Level of Assistance: Stand-by assistance     Gait Description (WDL): Exceptions to WDL                             Therapeutic Exercises:    Instructed patient to continue active range of motion exercise on both legs while up on chair or on bed. Functional Measure:  Tinetti test:    Sitting Balance: 1  Arises: 2  Attempts to Rise: 2  Immediate Standing Balance: 2  Standing Balance: 2  Nudged: 2  Eyes Closed: 1  Turn 360 Degrees - Continuous/Discontinuous: 1  Turn 360 Degrees - Steady/Unsteady: 1  Sitting Down: 2  Balance Score: 16  Indication of Gait: 1  R Step Length/Height: 1  L Step Length/Height: 1  R Foot Clearance: 1  L Foot Clearance: 1  Step Symmetry: 1  Step Continuity: 1  Path: 2  Trunk: 2  Walking Time: 0  Gait Score: 11  Total Score: 27       Tinetti Test and G-code impairment scale:  Percentage of Impairment CH    0%   CI    1-19% CJ    20-39% CK    40-59% CL    60-79% CM    80-99% CN     100%   Tinetti  Score 0-28 28 23-27 17-22 12-16 6-11 1-5 0       Tinetti Tool Score Risk of Falls  <19 = High Fall Risk  19-24 = Moderate Fall Risk  25-28 = Low Fall Risk  Tinetti ME. Performance-Oriented Assessment of Mobility Problems in Elderly Patients. [place] 66; [id number].  (Scoring Description: PT Bulletin Feb. 10, 1993)    Older adults: (Benja et al, 2009; n = 1000 [facility] elderly evaluated with ABC, KENNETH, ADL, and IADL)  · Mean KENNETH score for males aged 69-68 years = 26.21(3.40)  · Mean KENNETH score for females age 69-68 years = 25.16(4.30)  · Mean KENNETH score for males over 80 years = 23.29(6.02)  · Mean KENNETH score for females over 80 years = 17.20(8.32)       G codes: In compliance with CMSs Claims Based Outcome Reporting, the following G-code set was chosen for this patient based on their primary functional limitation being treated: The outcome measure chosen to determine the severity of the functional limitation was the tinetti with a score of 27/28 which was correlated with the impairment scale. ? Mobility - Walking and Moving Around:     - CURRENT STATUS: CI - 1%-19% impaired, limited or restricted    - GOAL STATUS: CH - 0% impaired, limited or restricted    - D/C STATUS:  ---------------To be determined---------------      Physical Therapy Evaluation Charge Determination   History Examination Presentation Decision-Making   LOW Complexity : Zero comorbidities / personal factors that will impact the outcome / POC LOW Complexity : 1-2 Standardized tests and measures addressing body structure, function, activity limitation and / or participation in recreation  LOW Complexity : Stable, uncomplicated  Other outcome measures tinetti  LOW       Based on the above components, the patient evaluation is determined to be of the following complexity level: LOW     Pain:  Pain Scale 1: Numeric (0 - 10)  Pain Intensity 1: 0              Activity Tolerance:   Good. Please refer to the flowsheet for vital signs taken during this treatment.   After treatment:   [x]         Patient left in no apparent distress sitting up in chair  []         Patient left in no apparent distress in bed  [x]         Call bell left within reach  [x]         Nursing notified  []         Caregiver present  []         Bed alarm activated    COMMUNICATION/EDUCATION:   The patients plan of care was discussed with: Registered Nurse, Physician and patient. [x]         Fall prevention education was provided and the patient/caregiver indicated understanding. [x]         Patient/family have participated as able in goal setting and plan of care. [x]         Patient/family agree to work toward stated goals and plan of care. []         Patient understands intent and goals of therapy, but is neutral about his/her participation. []         Patient is unable to participate in goal setting and plan of care. Thank you for this referral.  Suzi Mathis, PT,WCC.    Time Calculation: 25 mins

## 2018-03-07 NOTE — PROGRESS NOTES
Request for pre-op ureteral stent placement. Pt h/o recurrent diverticulitis. Denies any prior  surgery or h/o kidney stones. Discussed cysto/stent placement with patient, consent obtained. Proceed to OR.   Discussed possible risks of ureteral injury during sigmoidectomy and need for repair vs. Longer duration of stents

## 2018-03-07 NOTE — OP NOTES
Date: 03/07/18    Pre-operative Diagnosis: Recurrent diverticulitis of the sigmoid colon     Post-operative Diagnosis:  Recurrent diverticulitis of the sigmoid colon     Procedure: Laparoscopic sigmoid colectomy     Surgeon: Jaja Castellanos MD     Assistant: Nataly Jeff     Anesthesia: General     Estimated Blood Loss: 50 cc     Findings: Inflamed thickened distal sigmoid colon with multiple diverticula. No bubbles on anastomotic leak test. Anastomotic donuts intact     Specimen: Sigmoid colon, anastomotic donuts         Indication: 79year-old male with recurrent sigmoid diverticulitis. Colonoscopy prior to surgery was negative for malignancy. He presented for elective sigmoid colectomy.          Procedure: After written informed consent was obtained He was brought to the operating room, placed supine on the table and general endotracheal anesthesia was induced without complication. He was positioned in the low lithoomy position in yellow-fin stirrups. All pressure points were padded. The abdomen and perineum was prepped and draped in the usual sterile fashion. A time-out was performed verifying the correct patient, procedure, allergies, laterality and administration of antibiotics. He received Cefoxitin pre-incision. Ureteral stents were placed by the Urology service. A Crandall catheter was placed under sterile conditions. SCDs were placed and turned on for DVT prophylaxis. The rectum was irrigated with betadine mixed with 1L sterile saline.       The abdomen was entered via placement of a 5 mm optical trocar placed under laparoscopic guidance in the right upper quadrant midclavicular line. Pneumoperitoneum was established and maintained at 15 mm Hg. The laparoscope was inserted and inspection of the entry area showed not injury to bowel or blood vessels. Next three 5 mm ports were placed in the supraumbilical, right lower quadrant, and left lower quadrant.  The patient was positioned in Trendelenburg with the right side down. The sigmoid colon was inflamed and densely adherent to the anterior wall in the pelvis. The small bowel was retracted out of the pelvis and placed in the right upper quadrant. The sigmoid colon was retracted upward exposing the base of the mesentery. The base of the mesentery was scored with the LigaSure device. The BHAVNA pedicle was isolated with a combination of blunt dissection. The left ureter was identified and protected. The BHAVNA pedicle was divided with a Endo-SAVANA vascular stapler. The sigmoid and descending colon was mobilized off the retroperitoneum by incising Toldt's fascia with the LigaSure device. The descending colon and sigmoid colon were retracted medially off the retroperitoneum working cephalad towards the spleen. To facilitate this dissection a left lower quadrant hand Gelport was placed in the left lower quadrant at the site of the previously placed 5 mm port. Attention was then turned to the rectum. The upper rectum was identified by the splaying of the taenia coli. The mesorectum was divided with the LigaSure device and the upper rectum was divided with an Endo-SAVANA purple load. The sigmoid colon and upper rectum were brought out through the Gelport. The descending colon was divided in a soft , non-inflamed region. The remaining mesenteric vessels were taken with the LigaSure device. The specimen was passed off the field. A 31 mm anvil was secured in the descending colon with an 0 prolene suture. The descending colon with the anvil was placed in the abdomen and pneumoperitoneum was re-established. The 31 mm EEA stapler was passed trans-anally and positioned at the center of the staple line in the upper rectum. The spike was deployed and the anvil was docked. Care was taken to make sure the mesentery did not twist. The EEA stapler was fired and the device was removed. The anastomotic donuts were inspected and were both intact.  The pelvis was irrigated with saline and a proctoscope was inserted. The colon proximal to the anastomosis was occluded and air was insufflated into the colon and rectum. No bubbles were seen. The saline was suctioned. Hemostasis was satisfactory.      The handport was removed and the fascia was closed in 2 layers with #1 PDS. The skin was irrigated and closed in 2 layers, the deep dermal layer with 3-0 Vicryl and the skin with a running 4-0 Monocryl. Dermabond was applied.      All sponge and instrument counts were correct x 2. The patient tolerated the procedure well. He was extubated and transferred to the PACU for recovery.  I went and discussed the findings with the patient's wife in the waiting area.      Luma Peters MD

## 2018-03-07 NOTE — BRIEF OP NOTE
BRIEF OPERATIVE NOTE    Date of Procedure: 3/7/2018   Preoperative Diagnosis: DIVERTICULITIS SIGMOID COLON   Postoperative Diagnosis: * No post-op diagnosis entered *    Procedure(s):  CYSTOSCOPY WITH INSERTION BILATERAL URETERAL STENTS    Surgeon(s) and Role:    Panel 2:     * Tracy Carvalho MD - Primary         Assistant Staff: None      Surgical Staff:  Circ-1: Radha Aj RN  Scrub Tech-1: David Nunes  Surg Asst-1: Burgess Mayte Cosme  No case tracking events are documented in the log. Anesthesia: General   Estimated Blood Loss: none  Specimens: * No specimens in log *   Findings: normal urethra, mild prostatic hypertrophy. No bladder lesions.   Bilateral 5Fr ureteral catheters placed to 25cm w/o difficulty   Complications: none  Implants: * No implants in log *

## 2018-03-07 NOTE — ANESTHESIA PREPROCEDURE EVALUATION
Anesthetic History   No history of anesthetic complications            Review of Systems / Medical History  Patient summary reviewed, nursing notes reviewed and pertinent labs reviewed    Pulmonary  Within defined limits                 Neuro/Psych   Within defined limits           Cardiovascular    Hypertension: well controlled                   GI/Hepatic/Renal  Within defined limits              Endo/Other  Within defined limits           Other Findings   Comments: Hep C           Physical Exam    Airway  Mallampati: II  TM Distance: 4 - 6 cm  Neck ROM: normal range of motion   Mouth opening: Normal     Cardiovascular    Rhythm: regular  Rate: normal         Dental  No notable dental hx       Pulmonary  Breath sounds clear to auscultation               Abdominal         Other Findings            Anesthetic Plan    ASA: 2  Anesthesia type: general          Induction: Intravenous  Anesthetic plan and risks discussed with: Patient

## 2018-03-07 NOTE — IP AVS SNAPSHOT
[address] 
[phone] Patient: Niall Gilliam MRN: [id number] IBX:5/07/4803 A check keyon indicates which time of day the medication should be taken. My Medications START taking these medications Instructions Each Dose to Equal  
 Morning Noon Evening Bedtime * oxyCODONE-acetaminophen 5-325 mg per tablet Commonly known as:  PERCOCET Your last dose was: Your next dose is: Take 1-2 Tabs by mouth every four (4) hours as needed for Pain. Max Daily Amount: 12 Tabs. 1-2 Tab * oxyCODONE-acetaminophen 7.5-325 mg per tablet Commonly known as:  PERCOCET 7.5 Your last dose was: Your next dose is: Take 1 Tab by mouth every four (4) hours as needed for Pain. Max Daily Amount: 6 Tabs. 1 Tab * Notice: This list has 2 medication(s) that are the same as other medications prescribed for you. Read the directions carefully, and ask your doctor or other care provider to review them with you. CONTINUE taking these medications Instructions Each Dose to Equal  
 Morning Noon Evening Bedtime  
 amLODIPine 10 mg tablet Commonly known as:  Rulon Dona Your last dose was: Your next dose is: Take 10 mg by mouth daily. Indications: hypertension 10 mg  
    
   
   
   
  
 b complex vitamins tablet Your last dose was: Your next dose is: Take 1 Tab by mouth daily. 1 Tab  
    
   
   
   
  
 cloNIDine HCl 0.2 mg tablet Commonly known as:  CATAPRES Your last dose was: Your next dose is: Take 0.2 mg by mouth nightly. Indications: hypertension  
 0.2 mg  
    
   
   
   
  
 cyanocobalamin 500 mcg tablet Commonly known as:  VITAMIN B12 Your last dose was: Your next dose is: Take 500 mcg by mouth daily. 500 mcg DAILY MULTI-VITAMINS/IRON tablet Generic drug:  multivitamin with iron Your last dose was: Your next dose is: Take 1 Tab by mouth daily. 1 Tab  
    
   
   
   
  
 metoprolol succinate 50 mg XL tablet Commonly known as:  TOPROL-XL Your last dose was: Your next dose is: Take 50 mg by mouth daily. Takes at 5 pm  Indications: hypertension 50 mg  
    
   
   
   
  
 traMADol 50 mg tablet Commonly known as:  ULTRAM  
   
Your last dose was: Your next dose is: Take 1 Tab by mouth every six (6) hours as needed. Max Daily Amount: 200 mg. Indications: Pain 50 mg  
    
   
   
   
  
 VITAMIN C 500 mg tablet Generic drug:  ascorbic acid (vitamin C) Your last dose was: Your next dose is: Take 500 mg by mouth daily. 500 mg  
    
   
   
   
  
 VITAMIN D3 1,000 unit tablet Generic drug:  cholecalciferol Your last dose was: Your next dose is: Take 1,000 Units by mouth daily. 1000 Units XANAX 1 mg tablet Generic drug:  ALPRAZolam  
   
Your last dose was: Your next dose is: Take 1 mg by mouth nightly as needed for Anxiety. 1 mg Where to Get Your Medications Information on where to get these meds will be given to you by the nurse or doctor. ! Ask your nurse or doctor about these medications  
  oxyCODONE-acetaminophen 5-325 mg per tablet  
 oxyCODONE-acetaminophen 7.5-325 mg per tablet

## 2018-03-08 LAB
ANION GAP SERPL CALC-SCNC: 7 MMOL/L (ref 5–15)
BUN SERPL-MCNC: 7 MG/DL (ref 6–20)
BUN/CREAT SERPL: 11 (ref 12–20)
CALCIUM SERPL-MCNC: 8.3 MG/DL (ref 8.5–10.1)
CHLORIDE SERPL-SCNC: 103 MMOL/L (ref 97–108)
CO2 SERPL-SCNC: 29 MMOL/L (ref 21–32)
CREAT SERPL-MCNC: 0.66 MG/DL (ref 0.7–1.3)
ERYTHROCYTE [DISTWIDTH] IN BLOOD BY AUTOMATED COUNT: 13.1 % (ref 11.5–14.5)
GLUCOSE SERPL-MCNC: 102 MG/DL (ref 65–100)
HCT VFR BLD AUTO: 37.7 % (ref 36.6–50.3)
HGB BLD-MCNC: 12.4 G/DL (ref 12.1–17)
MAGNESIUM SERPL-MCNC: 1.5 MG/DL (ref 1.6–2.4)
MCH RBC QN AUTO: 30.3 PG (ref 26–34)
MCHC RBC AUTO-ENTMCNC: 32.9 G/DL (ref 30–36.5)
MCV RBC AUTO: 92.2 FL (ref 80–99)
NRBC # BLD: 0 K/UL (ref 0–0.01)
NRBC BLD-RTO: 0 PER 100 WBC
PHOSPHATE SERPL-MCNC: 4.1 MG/DL (ref 2.6–4.7)
PLATELET # BLD AUTO: 191 K/UL (ref 150–400)
PMV BLD AUTO: 11.4 FL (ref 8.9–12.9)
POTASSIUM SERPL-SCNC: 3.6 MMOL/L (ref 3.5–5.1)
RBC # BLD AUTO: 4.09 M/UL (ref 4.1–5.7)
SODIUM SERPL-SCNC: 139 MMOL/L (ref 136–145)
WBC # BLD AUTO: 18.5 K/UL (ref 4.1–11.1)

## 2018-03-08 PROCEDURE — 97530 THERAPEUTIC ACTIVITIES: CPT

## 2018-03-08 PROCEDURE — 85027 COMPLETE CBC AUTOMATED: CPT | Performed by: SURGERY

## 2018-03-08 PROCEDURE — 74011250637 HC RX REV CODE- 250/637: Performed by: SURGERY

## 2018-03-08 PROCEDURE — 97116 GAIT TRAINING THERAPY: CPT

## 2018-03-08 PROCEDURE — 36415 COLL VENOUS BLD VENIPUNCTURE: CPT | Performed by: SURGERY

## 2018-03-08 PROCEDURE — 83735 ASSAY OF MAGNESIUM: CPT | Performed by: SURGERY

## 2018-03-08 PROCEDURE — 80048 BASIC METABOLIC PNL TOTAL CA: CPT | Performed by: SURGERY

## 2018-03-08 PROCEDURE — 74011250637 HC RX REV CODE- 250/637: Performed by: PHYSICIAN ASSISTANT

## 2018-03-08 PROCEDURE — 65270000029 HC RM PRIVATE

## 2018-03-08 PROCEDURE — 84100 ASSAY OF PHOSPHORUS: CPT | Performed by: SURGERY

## 2018-03-08 PROCEDURE — 74011250637 HC RX REV CODE- 250/637: Performed by: INTERNAL MEDICINE

## 2018-03-08 PROCEDURE — 74011250636 HC RX REV CODE- 250/636: Performed by: INTERNAL MEDICINE

## 2018-03-08 PROCEDURE — 74011250636 HC RX REV CODE- 250/636: Performed by: PHYSICIAN ASSISTANT

## 2018-03-08 PROCEDURE — 74011250636 HC RX REV CODE- 250/636: Performed by: SURGERY

## 2018-03-08 RX ORDER — DEXTROSE, SODIUM CHLORIDE, AND POTASSIUM CHLORIDE 5; .45; .15 G/100ML; G/100ML; G/100ML
100 INJECTION INTRAVENOUS CONTINUOUS
Status: DISCONTINUED | OUTPATIENT
Start: 2018-03-08 | End: 2018-03-09

## 2018-03-08 RX ORDER — ACETAMINOPHEN 325 MG/1
650 TABLET ORAL EVERY 6 HOURS
Status: DISCONTINUED | OUTPATIENT
Start: 2018-03-08 | End: 2018-03-09

## 2018-03-08 RX ORDER — MAGNESIUM SULFATE HEPTAHYDRATE 40 MG/ML
2 INJECTION, SOLUTION INTRAVENOUS ONCE
Status: COMPLETED | OUTPATIENT
Start: 2018-03-08 | End: 2018-03-08

## 2018-03-08 RX ORDER — ENOXAPARIN SODIUM 100 MG/ML
40 INJECTION SUBCUTANEOUS DAILY
Status: DISCONTINUED | OUTPATIENT
Start: 2018-03-08 | End: 2018-03-11 | Stop reason: HOSPADM

## 2018-03-08 RX ADMIN — Medication: at 13:48

## 2018-03-08 RX ADMIN — Medication 10 ML: at 20:56

## 2018-03-08 RX ADMIN — ACETAMINOPHEN 650 MG: 325 TABLET ORAL at 17:09

## 2018-03-08 RX ADMIN — Medication 10 ML: at 13:51

## 2018-03-08 RX ADMIN — AMLODIPINE BESYLATE 10 MG: 5 TABLET ORAL at 09:15

## 2018-03-08 RX ADMIN — ACETAMINOPHEN 650 MG: 325 TABLET ORAL at 11:59

## 2018-03-08 RX ADMIN — MAGNESIUM SULFATE HEPTAHYDRATE 2 G: 40 INJECTION, SOLUTION INTRAVENOUS at 09:45

## 2018-03-08 RX ADMIN — METOPROLOL SUCCINATE 50 MG: 50 TABLET, FILM COATED, EXTENDED RELEASE ORAL at 17:09

## 2018-03-08 RX ADMIN — HEPARIN SODIUM 5000 UNITS: 5000 INJECTION, SOLUTION INTRAVENOUS; SUBCUTANEOUS at 01:34

## 2018-03-08 RX ADMIN — HEPARIN SODIUM 5000 UNITS: 5000 INJECTION, SOLUTION INTRAVENOUS; SUBCUTANEOUS at 09:16

## 2018-03-08 RX ADMIN — ACETAMINOPHEN 650 MG: 325 TABLET ORAL at 20:54

## 2018-03-08 RX ADMIN — DEXTROSE MONOHYDRATE, SODIUM CHLORIDE, AND POTASSIUM CHLORIDE 100 ML/HR: 50; 4.5; 1.49 INJECTION, SOLUTION INTRAVENOUS at 17:09

## 2018-03-08 RX ADMIN — DEXTROSE MONOHYDRATE, SODIUM CHLORIDE, AND POTASSIUM CHLORIDE 100 ML/HR: 50; 4.5; 1.49 INJECTION, SOLUTION INTRAVENOUS at 09:14

## 2018-03-08 RX ADMIN — ALPRAZOLAM 1 MG: 0.5 TABLET ORAL at 20:54

## 2018-03-08 RX ADMIN — SODIUM CHLORIDE, SODIUM LACTATE, POTASSIUM CHLORIDE, AND CALCIUM CHLORIDE 125 ML/HR: 600; 310; 30; 20 INJECTION, SOLUTION INTRAVENOUS at 01:34

## 2018-03-08 NOTE — PROGRESS NOTES
3/8/2018  9:52 AM  CM met with pt for assessment. Demographics and PCP were confirmed. Pt is a 79year old,  male who lives in a private residence with his wife (4 exterior steps, 1 flight interior steps). PTA, pt was able to complete ADLs without the use of DME. Pt has prescription drug coverage, and prefers [facility] [address] [facility] on [address] Pt has prior outpatient PT approx 4 years ago. No discharge service needs indicated. Pt notified of DC times, and pt's wife will provide transport upon discharge. Melissa Durán MA    Care Management Interventions  PCP Verified by CM: Yes Paulette Mcclendon)  Palliative Care Criteria Met (RRAT>21 & CHF Dx)?: No  Mode of Transport at Discharge:  Other (see comment) (Wife)  Thonyt Signup: No  Discharge Durable Medical Equipment: No  Physical Therapy Consult: Yes  Occupational Therapy Consult: No  Speech Therapy Consult: No  Current Support Network: Lives with Spouse  Confirm Follow Up Transport: Family  Plan discussed with Pt/Family/Caregiver: Yes  Discharge Location  Discharge Placement: Home with family assistance

## 2018-03-08 NOTE — PROGRESS NOTES
Spiritual Care Partner Volunteer visited patient in post surgical unit 4th floor on 3/8/2018. Documented by:   Rev. Ana Crocker.  Rula Small MA, [facility]    Lead  Profession Development & Advancement

## 2018-03-08 NOTE — PROGRESS NOTES
Spiritual Care Assessment/Progress Note  [address]      NAME: Betito Au      MRN: [id number]  AGE: 79 y.o.  SEX: male  Gnosticism Affiliation: Sikh   Language: English     3/8/2018     Total Time (in minutes): 9     Spiritual Assessment begun in [facility] 4M POST SURG ORT 2 through conversation with:         [x]Patient        [] Family    [] Friend(s)        Reason for Consult: Advance medical directive consult     Spiritual beliefs: (Please include comment if needed)     [x] Involved in a nathaniel tradition/spiritual practice: Camilla Hahn     [] Supported by a nathaniel community:      [] Claims no spiritual orientation:      [] Seeking spiritual identity:           [] Adheres to an individual form of spirituality:      [] Not able to assess:                     Identified resources for coping:      [] Prayer                  [] Devotional reading               [] Music                  [] Guided Imagery     [x] Family/friends                 [] Pet visits     [] Other:       Interventions offered during this visit: (See comments for more details)    Patient Interventions: Advance medical directive consult, Initial/Spiritual assessment, patient floor, Prayer (assurance of)     Family/Friend(s): Advance medical directive consult     Plan of Care:     [] Discuss Spiritual/Cultural needs    [] Support AMD and/or advance care planning process      [] Support grieving process   [] Coordinate Rites/Rituals    [] Coordination with community clergy   [] No spiritual needs identified at this time   [x] Detailed Plan of Care below (See Comments)  [] Make referral to Music Therapy  [] Make referral to Pet Therapy     [] Make referral to Addiction services  [] Make referral to [facility]  [] Make referral to Spiritual Care Partner  [] No future visits requested             Comments: Responded to in-basket request for patient to complete an AMD.  Discussed what an Advance Directive is with patient and his wife who was present. Patient did not desire to complete an AMD at this time. Patient would like for his wife to be his medical power of  and he would like to try all things for at least ten days, per patient. Offered presence and care to patient. Pt is Anabaptism. Assured pt of pastoral care availability and support, and able to assist pt in completion of document at a later time if he changes his mind. Please page as needed/desired. [phone]. Visit by: Francisca Stearns. Ana Crocker.  Rula Small MA, [facility]    Lead  Profession Development & Advancement

## 2018-03-08 NOTE — PROGRESS NOTES
Problem: Mobility Impaired (Adult and Pediatric)  Goal: *Acute Goals and Plan of Care (Insert Text)  Physical Therapy Goals  Initiated 3/7/2018  1. Patient will move from supine to sit and sit to supine , scoot up and down and roll side to side in bed with independence within 7 day(s). 2.  Patient will transfer from bed to chair and chair to bed with independence using the least restrictive device within 7 day(s). 3.  Patient will perform sit to stand with independence within 7 day(s). 4.  Patient will ambulate with independence for 200 feet with the least restrictive device within 7 day(s). 5.  Patient will ascend/descend 4 stairs with  handrail(s) with independence within 7 day(s). physical Therapy TREATMENT  Patient: Julissa Fortune (48 y.o. male)  Date: 3/8/2018  Diagnosis: DIVERTICULITIS SIGMOID COLON   Sigmoid diverticulitis <principal problem not specified>  Procedure(s) (LRB):  LAPAROSCOPIC SIGMOID COLECTOMY POSSIBLE OPEN (N/A)  CYSTOSCOPY URETERAL STENT INSERTION BILATERAL (Bilateral) 1 Day Post-Op  Precautions:    Chart, physical therapy assessment, plan of care and goals were reviewed. ASSESSMENT:  Pt CGA supine to sit to stand. Pt ambulated 130ft with no device CGA. Pts balance is fair to good on level surface. Pt CGA to min assist back to bed. Pt progressing with mobility. Continue goals. Progression toward goals:  [x]      Improving appropriately and progressing toward goals  []      Improving slowly and progressing toward goals  []      Not making progress toward goals and plan of care will be adjusted     PLAN:  Patient continues to benefit from skilled intervention to address the above impairments. Continue treatment per established plan of care.   Discharge Recommendations:  Home Health vs None  Further Equipment Recommendations for Discharge:  none     SUBJECTIVE:       OBJECTIVE DATA SUMMARY:   Critical Behavior:  Neurologic State: Alert  Orientation Level: Oriented X4  Cognition: Appropriate decision making, Appropriate for age attention/concentration, Appropriate safety awareness, Follows commands     Functional Mobility Training:  Bed Mobility:     Supine to Sit: Contact guard assistance  Sit to Supine: Contact guard assistance;Minimum assistance            Transfers:  Sit to Stand: Contact guard assistance  Stand to Sit: Contact guard assistance                             Balance:     Ambulation/Gait Training:  Distance (ft): 130 Feet (ft)  Assistive Device: Gait belt  Ambulation - Level of Assistance: Contact guard assistance        Gait Abnormalities: Decreased step clearance        Base of Support: Narrowed     Speed/Cherie: Pace decreased (<100 feet/min)  Step Length: Right shortened;Left shortened                Pain:  Pain Scale 1: Numeric (0 - 10) (using PCA)  Pain Intensity 1: 6  Pain Location 1: Abdomen  Pain Orientation 1: Anterior  Pain Description 1: Aching     Activity Tolerance:   Pt progressing well. Continue goals. Please refer to the flowsheet for vital signs taken during this treatment.   After treatment:   [] Patient left in no apparent distress sitting up in chair  [x] Patient left in no apparent distress in bed  [] Call bell left within reach  [] Nursing notified  [] Caregiver present  [] Bed alarm activated    COMMUNICATION/COLLABORATION:   The patients plan of care was discussed with: Physical Therapist    Vanna Gill PTA   Time Calculation: 23 mins

## 2018-03-08 NOTE — ACP (ADVANCE CARE PLANNING)
Responded to in-basket request for patient to complete an AMD.  Discussed what an Advance Directive is with patient and his wife who was present. Patient did not desire to complete an AMD at this time. Patient would like for his wife to be his medical power of  and he would like to try all things for at least ten days, per patient. Offered presence and care to patient. Pt is Anabaptism. Assured pt of pastoral care availability and support, and able to assist pt in completion of document at a later time if he changes his mind. Please page as needed/desired. [phone]. Visit by: Samara Carrasco. Flaquito Kilpatrick.  Rula Small MA, [facility]    Lead  Profession Development & Advancement

## 2018-03-08 NOTE — PROGRESS NOTES
General Surgery Daily Progress Note    Patient: Linette Campa MRN: [id number]  SSN: [id number]    YOB: 1950  Age: 79 y.o. Sex: male      Admit Date: 3/7/2018    Subjective:   Pain not well controlled on current PCA settings, tolerating clears. No BM or flatus, did pass a little blood from rectum. Denies N/V. Current Facility-Administered Medications   Medication Dose Route Frequency    dextrose 5% - 0.45% NaCl with KCl 20 mEq/L infusion  100 mL/hr IntraVENous CONTINUOUS    acetaminophen (TYLENOL) tablet 650 mg  650 mg Oral Q6H    magnesium sulfate 2 g/50 ml IVPB (premix or compounded)  2 g IntraVENous ONCE    sodium chloride (NS) flush 5-10 mL  5-10 mL IntraVENous Q8H    sodium chloride (NS) flush 5-10 mL  5-10 mL IntraVENous PRN    sodium chloride (NS) flush 5-10 mL  5-10 mL IntraVENous Q8H    sodium chloride (NS) flush 5-10 mL  5-10 mL IntraVENous PRN    ondansetron (ZOFRAN) injection 4 mg  4 mg IntraVENous Q4H PRN    HYDROmorphone (PF) 15 mg/30 ml (DILAUDID) PCA   IntraVENous TITRATE    ALPRAZolam (XANAX) tablet 1 mg  1 mg Oral QHS PRN    heparin (porcine) injection 5,000 Units  5,000 Units SubCUTAneous Q8H    amLODIPine (NORVASC) tablet 10 mg  10 mg Oral DAILY    metoprolol succinate (TOPROL-XL) XL tablet 50 mg  50 mg Oral DAILY        Objective:   03/08 0701 - 03/08 1900  In: -   Out: 1050 [Urine:1050]  03/06 1901 - 03/08 0700  In: 3000 [I.V.:3000]  Out: 4500 [Urine:4400]  Patient Vitals for the past 8 hrs:   BP Temp Pulse Resp SpO2   03/08/18 0754 145/85 98.4 °F (36.9 °C) 82 18 94 %   03/08/18 0337 116/71 98 °F (36.7 °C) 91 18 94 %       Physical Exam:  General: Alert, cooperative, NAD  Lungs: Unlabored  Heart:  Regular rate and  rhythm  Abdomen: Soft, ATTP, mildly distended. + bowel sounds. Incisions c/d/i.    Extremities: Warm, moves all, no edema  Skin:  Warm and dry, no rash    Labs: Recent Labs      03/08/18   0337   WBC  18.5*   HGB  12.4   HCT  37.7   PLT  191 Recent Labs      03/08/18   0337  03/05/18   1147   NA  139  143   K  3.6  4.3   CL  103  103   CO2  29  33*   GLU  102*  72   BUN  7  15   CREA  0.66*  0.70   CA  8.3*  8.9   MG  1.5*   --    PHOS  4.1   --    ALB   --   3.8   TBILI   --   0.4   SGOT   --   42*   ALT   --   69       Assessment / Plan:   · POD#1 lap sigmoidectomy  · CLD   · Adjust PCA, add scheduled Tylenol  · D/c ram  · Replete Mg  · PT/OT, encourage IS  · Pathology pending

## 2018-03-08 NOTE — PROGRESS NOTES
Bedside and Verbal shift change report given to Kim Palacios RN (oncoming nurse) by Mauro Leonardo RN (offgoing nurse). Report included the following information SBAR, Kardex, Procedure Summary, Intake/Output, MAR, Accordion and Recent Results.

## 2018-03-08 NOTE — PROGRESS NOTES
Lazaro Foy [facility] 79  [address]  [phone]      Medical Progress Note      NAME: Lul Moore   :  1950  MRM:  [id number]    Date/Time: 3/8/2018         Subjective:     Chief Complaint:  Patient was seen and examined by me. Chart reviewed. Doing well post colectomy. Denied chest pain, SOB. Having flatus       Objective:       Vitals:       Last 24hrs VS reviewed since prior progress note.  Most recent are:    Visit Vitals    /85 (BP 1 Location: Left arm, BP Patient Position: At rest;Sitting)    Pulse 82    Temp 98.4 °F (36.9 °C)    Resp 18    Ht 5' 9\" (1.753 m)    Wt 66.1 kg (145 lb 11.6 oz)    SpO2 94%    BMI 21.52 kg/m2     SpO2 Readings from Last 6 Encounters:   18 94%   18 97%   18 100%   18 96%   11/18/15 96%   07/26/15 96%    O2 Flow Rate (L/min): 2 l/min     Intake/Output Summary (Last 24 hours) at 18 1020  Last data filed at 18 0735   Gross per 24 hour   Intake             2000 ml   Output             5500 ml   Net            -3500 ml        Exam:     Physical Exam:    Gen:  Thin, frail, NAD  HEENT:  Pink conjunctivae, PERRL, hearing intact to voice, moist mucous membranes  Neck:  Supple, without masses, thyroid non-tender  Resp:  No accessory muscle use, clear breath sounds without wheezes rales or rhonchi  Card:  No murmurs, normal S1, S2 without thrills, bruits or peripheral edema  Abd:  Soft, non-tender, non-distended, normoactive bowel sounds are present, abd wounds c/d/i  Musc:  No cyanosis or clubbing  Skin:  No rashes  Neuro:  Cranial nerves 3-12 are grossly intact, follows commands appropriately  Psych:  Good insight, oriented to person, place and time, alert    Medications Reviewed: (see below)    Lab Data Reviewed: (see below)    ______________________________________________________________________    Medications:     Current Facility-Administered Medications   Medication Dose Route Frequency    dextrose 5% - 0.45% NaCl with KCl 20 mEq/L infusion  100 mL/hr IntraVENous CONTINUOUS    acetaminophen (TYLENOL) tablet 650 mg  650 mg Oral Q6H    magnesium sulfate 2 g/50 ml IVPB (premix or compounded)  2 g IntraVENous ONCE    sodium chloride (NS) flush 5-10 mL  5-10 mL IntraVENous Q8H    sodium chloride (NS) flush 5-10 mL  5-10 mL IntraVENous PRN    sodium chloride (NS) flush 5-10 mL  5-10 mL IntraVENous Q8H    sodium chloride (NS) flush 5-10 mL  5-10 mL IntraVENous PRN    ondansetron (ZOFRAN) injection 4 mg  4 mg IntraVENous Q4H PRN    HYDROmorphone (PF) 15 mg/30 ml (DILAUDID) PCA   IntraVENous TITRATE    ALPRAZolam (XANAX) tablet 1 mg  1 mg Oral QHS PRN    heparin (porcine) injection 5,000 Units  5,000 Units SubCUTAneous Q8H    amLODIPine (NORVASC) tablet 10 mg  10 mg Oral DAILY    metoprolol succinate (TOPROL-XL) XL tablet 50 mg  50 mg Oral DAILY          Lab Review:     Recent Labs      03/08/18   0337  03/05/18   1147   WBC  18.5*  7.0   HGB  12.4  14.2   HCT  37.7  42.0   PLT  191  195     Recent Labs      03/08/18   0337  03/05/18   1147   NA  139  143   K  3.6  4.3   CL  103  103   CO2  29  33*   GLU  102*  72   BUN  7  15   CREA  0.66*  0.70   CA  8.3*  8.9   MG  1.5*   --    PHOS  4.1   --    ALB   --   3.8   TBILI   --   0.4   SGOT   --   42*   ALT   --   69     Lab Results   Component Value Date/Time    Glucose (POC) 101 (H) 01/20/2018 05:46 PM          Assessment / Plan: Active Problems:    78 yo hx of HTN, Hep C, recurrent diverticulitis, s/p sigmoid colectomy. Medicine is following as a consultant    1) Sigmoid diverticulitis: s/p sigmoid colectomy. Management per primary team    2) HTN: BP stable.   Cont toprol, norvac    3) Anxiety: resume xanax once off PCA    4) Hep C: f/u outpatient    Total time spent with patient: 20 min                  Care Plan discussed with: Patient, nursing    Discussed:  Care Plan    Prophylaxis:  Hep SQ    Disposition:  per team           ___________________________________________________    Attending Physician: Nelson Hogan MD

## 2018-03-09 LAB
ANION GAP SERPL CALC-SCNC: 5 MMOL/L (ref 5–15)
BUN SERPL-MCNC: 3 MG/DL (ref 6–20)
BUN/CREAT SERPL: 5 (ref 12–20)
CALCIUM SERPL-MCNC: 8.2 MG/DL (ref 8.5–10.1)
CHLORIDE SERPL-SCNC: 100 MMOL/L (ref 97–108)
CO2 SERPL-SCNC: 33 MMOL/L (ref 21–32)
CREAT SERPL-MCNC: 0.64 MG/DL (ref 0.7–1.3)
ERYTHROCYTE [DISTWIDTH] IN BLOOD BY AUTOMATED COUNT: 13.2 % (ref 11.5–14.5)
GLUCOSE SERPL-MCNC: 100 MG/DL (ref 65–100)
HCT VFR BLD AUTO: 39 % (ref 36.6–50.3)
HGB BLD-MCNC: 12.6 G/DL (ref 12.1–17)
MAGNESIUM SERPL-MCNC: 1.8 MG/DL (ref 1.6–2.4)
MCH RBC QN AUTO: 30.1 PG (ref 26–34)
MCHC RBC AUTO-ENTMCNC: 32.3 G/DL (ref 30–36.5)
MCV RBC AUTO: 93.1 FL (ref 80–99)
NRBC # BLD: 0 K/UL (ref 0–0.01)
NRBC BLD-RTO: 0 PER 100 WBC
PHOSPHATE SERPL-MCNC: 3.1 MG/DL (ref 2.6–4.7)
PLATELET # BLD AUTO: 180 K/UL (ref 150–400)
PMV BLD AUTO: 10.5 FL (ref 8.9–12.9)
POTASSIUM SERPL-SCNC: 3.6 MMOL/L (ref 3.5–5.1)
RBC # BLD AUTO: 4.19 M/UL (ref 4.1–5.7)
SODIUM SERPL-SCNC: 138 MMOL/L (ref 136–145)
WBC # BLD AUTO: 12.7 K/UL (ref 4.1–11.1)

## 2018-03-09 PROCEDURE — 97530 THERAPEUTIC ACTIVITIES: CPT

## 2018-03-09 PROCEDURE — 84100 ASSAY OF PHOSPHORUS: CPT | Performed by: SURGERY

## 2018-03-09 PROCEDURE — 97116 GAIT TRAINING THERAPY: CPT

## 2018-03-09 PROCEDURE — 74011250637 HC RX REV CODE- 250/637: Performed by: INTERNAL MEDICINE

## 2018-03-09 PROCEDURE — 74011250637 HC RX REV CODE- 250/637: Performed by: PHYSICIAN ASSISTANT

## 2018-03-09 PROCEDURE — 85027 COMPLETE CBC AUTOMATED: CPT | Performed by: SURGERY

## 2018-03-09 PROCEDURE — 74011250636 HC RX REV CODE- 250/636: Performed by: PHYSICIAN ASSISTANT

## 2018-03-09 PROCEDURE — 65270000029 HC RM PRIVATE

## 2018-03-09 PROCEDURE — 80048 BASIC METABOLIC PNL TOTAL CA: CPT | Performed by: SURGERY

## 2018-03-09 PROCEDURE — 74011250636 HC RX REV CODE- 250/636: Performed by: SURGERY

## 2018-03-09 PROCEDURE — 74011250637 HC RX REV CODE- 250/637: Performed by: SURGERY

## 2018-03-09 PROCEDURE — 83735 ASSAY OF MAGNESIUM: CPT | Performed by: SURGERY

## 2018-03-09 PROCEDURE — 36415 COLL VENOUS BLD VENIPUNCTURE: CPT | Performed by: SURGERY

## 2018-03-09 RX ORDER — OXYCODONE AND ACETAMINOPHEN 10; 325 MG/1; MG/1
1 TABLET ORAL
Status: DISCONTINUED | OUTPATIENT
Start: 2018-03-09 | End: 2018-03-11 | Stop reason: HOSPADM

## 2018-03-09 RX ORDER — OXYCODONE AND ACETAMINOPHEN 5; 325 MG/1; MG/1
1-2 TABLET ORAL
Qty: 30 TAB | Refills: 0 | Status: SHIPPED | OUTPATIENT
Start: 2018-03-09

## 2018-03-09 RX ORDER — HYDROMORPHONE HYDROCHLORIDE 1 MG/ML
0.5 INJECTION, SOLUTION INTRAMUSCULAR; INTRAVENOUS; SUBCUTANEOUS
Status: DISCONTINUED | OUTPATIENT
Start: 2018-03-09 | End: 2018-03-11 | Stop reason: HOSPADM

## 2018-03-09 RX ORDER — MAG HYDROX/ALUMINUM HYD/SIMETH 200-200-20
30 SUSPENSION, ORAL (FINAL DOSE FORM) ORAL
Status: DISCONTINUED | OUTPATIENT
Start: 2018-03-09 | End: 2018-03-11 | Stop reason: HOSPADM

## 2018-03-09 RX ORDER — OXYCODONE AND ACETAMINOPHEN 5; 325 MG/1; MG/1
1 TABLET ORAL
Status: DISCONTINUED | OUTPATIENT
Start: 2018-03-09 | End: 2018-03-11 | Stop reason: HOSPADM

## 2018-03-09 RX ADMIN — HYDROMORPHONE HYDROCHLORIDE 0.5 MG: 1 INJECTION, SOLUTION INTRAMUSCULAR; INTRAVENOUS; SUBCUTANEOUS at 13:40

## 2018-03-09 RX ADMIN — ALPRAZOLAM 1 MG: 0.5 TABLET ORAL at 21:22

## 2018-03-09 RX ADMIN — Medication 10 ML: at 20:39

## 2018-03-09 RX ADMIN — ENOXAPARIN SODIUM 40 MG: 40 INJECTION SUBCUTANEOUS at 20:38

## 2018-03-09 RX ADMIN — Medication 10 ML: at 14:55

## 2018-03-09 RX ADMIN — ALUMINUM HYDROXIDE, MAGNESIUM HYDROXIDE, AND SIMETHICONE 30 ML: 200; 200; 20 SUSPENSION ORAL at 19:43

## 2018-03-09 RX ADMIN — AMLODIPINE BESYLATE 10 MG: 5 TABLET ORAL at 08:49

## 2018-03-09 RX ADMIN — OXYCODONE AND ACETAMINOPHEN 1 TABLET: 10; 325 TABLET ORAL at 20:38

## 2018-03-09 RX ADMIN — ACETAMINOPHEN 650 MG: 325 TABLET ORAL at 02:20

## 2018-03-09 RX ADMIN — OXYCODONE AND ACETAMINOPHEN 1 TABLET: 10; 325 TABLET ORAL at 12:43

## 2018-03-09 RX ADMIN — Medication 10 ML: at 02:22

## 2018-03-09 RX ADMIN — OXYCODONE AND ACETAMINOPHEN 1 TABLET: 10; 325 TABLET ORAL at 16:45

## 2018-03-09 RX ADMIN — DEXTROSE MONOHYDRATE, SODIUM CHLORIDE, AND POTASSIUM CHLORIDE 100 ML/HR: 50; 4.5; 1.49 INJECTION, SOLUTION INTRAVENOUS at 02:19

## 2018-03-09 RX ADMIN — METOPROLOL SUCCINATE 50 MG: 50 TABLET, FILM COATED, EXTENDED RELEASE ORAL at 16:43

## 2018-03-09 NOTE — PROGRESS NOTES
Nutrition Assessment:    RECOMMENDATIONS/INTERVENTION(S):   Monitor tolerance to GI lite diet   Monitor PO intakes, wt, GI status, pain. Add Ensure HP once per day for snacks -     ASSESSMENT:   3/9: MST noted. 79 yr old male admitted for recurrent diverticulitis. Pt POD#2 sigmoid colectomy. Pt states in 7/10 pain. Doesn't change when eating. Pt states pain internal. On PCA pump. Pt has lost 2 lbs since previous admission. Pt states he eats just enough to survive. Due to dental implants breaking?/falling out and front incisor crown breaking and mouth blisters when using dentures, along with abdominal pain, pt finds eating a \"horrible experience. \" Pt eating ice cream and jello during visit. Pt on clear liquid diet. Noted after discussion pt advanced to GI lite diet. Monitor tolerance. Pt has had diet education previously. Reminded pt of low fiber/low fat during recovery. Labs: reviewed. SUBJECTIVE/OBJECTIVE:   Diet Order: Full liquids  % Eaten:  No data found. Pertinent Medications: [x] Reviewed    Past Medical History:   Diagnosis Date    Diverticulitis 2018    Hepatitis C 1992    Hypertension     Pancreatitis 2016        Chemistries:  Lab Results   Component Value Date/Time    Sodium 138 03/09/2018 06:05 AM    Potassium 3.6 03/09/2018 06:05 AM    Chloride 100 03/09/2018 06:05 AM    CO2 33 (H) 03/09/2018 06:05 AM    Anion gap 5 03/09/2018 06:05 AM    Glucose 100 03/09/2018 06:05 AM    BUN 3 (L) 03/09/2018 06:05 AM    Creatinine 0.64 (L) 03/09/2018 06:05 AM    BUN/Creatinine ratio 5 (L) 03/09/2018 06:05 AM    GFR est AA >60 03/09/2018 06:05 AM    GFR est non-AA >60 03/09/2018 06:05 AM    Calcium 8.2 (L) 03/09/2018 06:05 AM    AST (SGOT) 42 (H) 03/05/2018 11:47 AM    Alk.  phosphatase 45 03/05/2018 11:47 AM    Protein, total 6.6 03/05/2018 11:47 AM    Albumin 3.8 03/05/2018 11:47 AM    Globulin 2.8 03/05/2018 11:47 AM    A-G Ratio 1.4 03/05/2018 11:47 AM    ALT (SGPT) 69 03/05/2018 11:47 AM Anthropometrics: Height: 5' 9\" (175.3 cm) Weight: 66.1 kg (145 lb 11.6 oz)    IBW (%IBW):   ( ) UBW (%UBW):   (  %)    BMI: Body mass index is 21.52 kg/(m^2). This BMI is indicative of:     [x] Underweight for age   [] Normal    [] Overweight    []  Obesity    []  Extreme Obesity (BMI>40)    Estimated Nutrition Needs (Based on): 1855 Kcals/day (BMR(1427x1.3)) , 66 g (-79g/day(1.0-1.2g/kg)) Protein  Carbohydrate: At Least 130 g/day  Fluids: 1850 mL/day    Last BM: 3/8- small-bloody   []Active     []Hyperactive  []Hypoactive       [] Absent   BS  Skin:    [] Intact   [x] Incision- abdomen  [] Breakdown   [] DTI   [] Tears/Excoriation/Abrasion  []Edema [] Other:    Wt Readings from Last 30 Encounters:   03/07/18 66.1 kg (145 lb 11.6 oz)   03/05/18 66.1 kg (145 lb 11.6 oz)   02/23/18 66.7 kg (147 lb)   01/19/18 66.7 kg (147 lb)   11/18/15 71.2 kg (157 lb)   07/26/15 69.9 kg (154 lb)      NUTRITION DIAGNOSES:   Problem:  Altered GI function     Etiology: related to alteration in GI structure and function     Signs/Symptoms: as evidenced by GI surg POD#2- sigmoid colectomy      NUTRITION INTERVENTIONS:  Meals/Snacks: General/healthful diet   Supplements: Commercial supplement              GOAL:   Pt will consume and tolerate >50% of solid/low fiber foods within 2-4 days    Cultural, Congregational, or Ethnic Dietary Needs: None     LEARNING NEEDS (Diet, Food/Nutrient-Drug Interaction):    [x] None Identified   [] Identified and Education Provided/Documented   [] Identified and Pt declined/was not appropriate      [x] Interdisciplinary Care Plan Reviewed/Documented    [x] Discharge Needs:    TBD   [] No Nutrition Related Discharge Needs    NUTRITION RISK:   Pt Is At Nutrition Risk  [x]     No Nutrition Risk Identified  []       PT SEEN FOR:    []  MD Consult: []Calorie Count      []Diabetic Diet Education        []Diet Education     []Electrolyte Management     []General Nutrition Management and Supplements     []Management of Tube Feeding     []TPN Recommendations    [x]  RN Referral:  [x]MST score >=2     []Enteral/Parenteral Nutrition PTA     []Pregnant: Gestational DM or Multigestation                 [] Pressure Ulcer      []  Low BMI      []  Length of Stay       [] Dysphagia Diet     [] Ventilator      [] Follow-Up        Previous Recommendations:   [] Implemented          [] Not Implemented          [x] Not Applicable    Previous Goal:   [] Met              [] Progressing Towards Goal              [] Not Progressing Towards Goal   [x] Not Applicable              [facility], [address] [place] [address]  Pager: [phone]  Office: [phone]      '

## 2018-03-09 NOTE — PROGRESS NOTES
Lazaro Foy nelly [address] 79  [address]  [phone]      Medical Progress Note      NAME: Angela Recinos   :  1950  MRM:  [id number]    Date/Time: 3/9/2018          Subjective:     Chief Complaint:  Abdominal pain    Chart/notes/labs/studies reviewed, patient examined at bedside. C/o uncontrolled abdominal pain. Upset that his PCA was stopped. No CP or SOB. No fevers. Had small amount of BM          Objective:       Vitals:          Last 24hrs VS reviewed since prior progress note. Most recent are:    Visit Vitals    /78 (BP 1 Location: Left arm, BP Patient Position: At rest)    Pulse 87    Temp 97.9 °F (36.6 °C)    Resp 17    Ht 5' 9\" (1.753 m)    Wt 66.1 kg (145 lb 11.6 oz)    SpO2 94%    BMI 21.52 kg/m2     SpO2 Readings from Last 6 Encounters:   18 94%   18 97%   18 100%   18 96%   11/18/15 96%   07/26/15 96%    O2 Flow Rate (L/min): 2 l/min     Intake/Output Summary (Last 24 hours) at 18 1605  Last data filed at 18 0928   Gross per 24 hour   Intake                0 ml   Output              900 ml   Net             -900 ml          Exam:     Physical Exam:    Gen:  Thin, chronically ill-appearing.  NAD  HEENT:  Pink conjunctivae, hearing intact to voice, moist mucous membranes  Neck:  Supple, without masses  Resp:  No accessory muscle use, clear breath sounds without wheezes rales or rhonchi  Card:  No murmurs, normal S1, S2 without thrills, bruits or peripheral edema  Abd:  Soft, non-tender, non-distended, +bowel sounds, abd wounds c/d/i  Musc:  No cyanosis or clubbing  Skin:  No rashes  Neuro:  Cranial nerves 3-12 are grossly intact, follows commands appropriately  Psych:  Good insight, oriented to person, place and time, alert        Medications Reviewed: (see below)    Lab Data Reviewed: (see below)    ______________________________________________________________________    Medications: Current Facility-Administered Medications   Medication Dose Route Frequency    oxyCODONE-acetaminophen (PERCOCET) 5-325 mg per tablet 1 Tab  1 Tab Oral Q4H PRN    oxyCODONE-acetaminophen (PERCOCET 10)  mg per tablet 1 Tab  1 Tab Oral Q4H PRN    HYDROmorphone (PF) (DILAUDID) injection 0.5 mg  0.5 mg IntraVENous Q3H PRN    alum-mag hydroxide-simeth (MYLANTA) oral suspension 30 mL  30 mL Oral Q4H PRN    enoxaparin (LOVENOX) injection 40 mg  40 mg SubCUTAneous DAILY    sodium chloride (NS) flush 5-10 mL  5-10 mL IntraVENous PRN    sodium chloride (NS) flush 5-10 mL  5-10 mL IntraVENous Q8H    sodium chloride (NS) flush 5-10 mL  5-10 mL IntraVENous PRN    ondansetron (ZOFRAN) injection 4 mg  4 mg IntraVENous Q4H PRN    ALPRAZolam (XANAX) tablet 1 mg  1 mg Oral QHS PRN    amLODIPine (NORVASC) tablet 10 mg  10 mg Oral DAILY    metoprolol succinate (TOPROL-XL) XL tablet 50 mg  50 mg Oral DAILY            Lab Review:     Recent Labs      03/09/18   0605  03/08/18   0337   WBC  12.7*  18.5*   HGB  12.6  12.4   HCT  39.0  37.7   PLT  180  191     Recent Labs      03/09/18   0605  03/08/18   0337   NA  138  139   K  3.6  3.6   CL  100  103   CO2  33*  29   GLU  100  102*   BUN  3*  7   CREA  0.64*  0.66*   CA  8.2*  8.3*   MG  1.8  1.5*   PHOS  3.1  4.1     No components found for: GLPOC  No results for input(s): PH, PCO2, PO2, HCO3, FIO2 in the last 72 hours. No results for input(s): INR in the last 72 hours. No lab exists for component: INREXT  No results found for: SDES  No results found for: CULT         Assessment / Plan:       Sigmoid diverticulitis: recurrent, s/p sigmoid colectomy. Mgmt per gen surgery, including PT/OT, DVT ppx, and disposition. Relayed to the nurse re: pt's concerns regarding pain control. Defer pain mgmt to primary team       HTN: BP well controlled despite complaints of severe pain. Continue Norvasc and metoprolol.  Clonidine on hold       Anxiety: on Xanax QHS PRN       Hep C: f/u outpatient with hepatology      Total time spent with patient: 25 minutes                  Care Plan discussed with: Patient, Nursing Staff and >50% of time spent in counseling and coordination of care    Discussed:  Care Plan    Prophylaxis:  Lovenox    Disposition:  Home w/Family           ___________________________________________________    Attending Physician: Solon Goodell, MD

## 2018-03-09 NOTE — PROGRESS NOTES
General Surgery Daily Progress Note    Patient: Marylee Pace MRN: [id number]  SSN: [id number]    YOB: 1950  Age: 79 y.o. Sex: male      Admit Date: 3/7/2018    Subjective:   Pain controlled on current PCA settings, tolerating clears. Passing flatus, did pass a little blood from rectum with a small BM. Denies N/V. Voided following Crandall removal    Current Facility-Administered Medications   Medication Dose Route Frequency    dextrose 5% - 0.45% NaCl with KCl 20 mEq/L infusion  100 mL/hr IntraVENous CONTINUOUS    acetaminophen (TYLENOL) tablet 650 mg  650 mg Oral Q6H    enoxaparin (LOVENOX) injection 40 mg  40 mg SubCUTAneous DAILY    sodium chloride (NS) flush 5-10 mL  5-10 mL IntraVENous PRN    sodium chloride (NS) flush 5-10 mL  5-10 mL IntraVENous Q8H    sodium chloride (NS) flush 5-10 mL  5-10 mL IntraVENous PRN    ondansetron (ZOFRAN) injection 4 mg  4 mg IntraVENous Q4H PRN    HYDROmorphone (PF) 15 mg/30 ml (DILAUDID) PCA   IntraVENous TITRATE    ALPRAZolam (XANAX) tablet 1 mg  1 mg Oral QHS PRN    amLODIPine (NORVASC) tablet 10 mg  10 mg Oral DAILY    metoprolol succinate (TOPROL-XL) XL tablet 50 mg  50 mg Oral DAILY        Objective:   03/09 0701 - 03/09 1900  In: -   Out: 100 [Urine:100]  03/07 1901 - 03/09 0700  In: 413.3 [I.V.:413.3]  Out: 1429 [Urine:5450]  Patient Vitals for the past 8 hrs:   BP Temp Pulse Resp SpO2   03/09/18 0926 - - - 18 -   03/09/18 0331 130/85 97.8 °F (36.6 °C) 80 17 97 %       Physical Exam:  General: Alert, cooperative, NAD  Lungs: Unlabored  Heart:  Regular rate and  rhythm  Abdomen: Soft, ATTP, mildly distended. + bowel sounds. Incisions c/d/i.    Extremities: Warm, moves all, no edema  Skin:  Warm and dry, no rash    Labs:   Recent Labs      03/09/18   0605   WBC  12.7*   HGB  12.6   HCT  39.0   PLT  180     Recent Labs      03/09/18   0605   NA  138   K  3.6   CL  100   CO2  33*   GLU  100   BUN  3*   CREA  0.64*   CA  8.2*   MG  1.8   PHOS  3.1 Assessment / Plan:   · POD#2 lap sigmoidectomy  · Advance diet  · D/c PCA, add Percocet  · Monitor I/O  · Stop IV fluids  · PT/OT, encourage IS  · Pathology pending

## 2018-03-09 NOTE — PROGRESS NOTES
MD weems pca, pt given first dose po pain med(perc 10), started c/o md taking his iv med away, nurse explained to pt that I also have iv for breakthrough pain. Will cont to monitor.

## 2018-03-09 NOTE — ROUTINE PROCESS
Bedside and Verbal shift change report given to Bobby Don RN (oncoming nurse) by Dariana Paul RN (offgoing nurse). Report included the following information SBAR, Intake/Output and MAR.

## 2018-03-10 LAB
ALBUMIN SERPL-MCNC: 3.2 G/DL (ref 3.5–5)
ALBUMIN/GLOB SERPL: 1 {RATIO} (ref 1.1–2.2)
ALP SERPL-CCNC: 38 U/L (ref 45–117)
ALT SERPL-CCNC: 87 U/L (ref 12–78)
ANION GAP SERPL CALC-SCNC: 5 MMOL/L (ref 5–15)
AST SERPL-CCNC: 56 U/L (ref 15–37)
BASOPHILS # BLD: 0 K/UL (ref 0–0.1)
BASOPHILS NFR BLD: 0 % (ref 0–1)
BILIRUB SERPL-MCNC: 0.5 MG/DL (ref 0.2–1)
BUN SERPL-MCNC: 5 MG/DL (ref 6–20)
BUN/CREAT SERPL: 8 (ref 12–20)
CALCIUM SERPL-MCNC: 8.5 MG/DL (ref 8.5–10.1)
CHLORIDE SERPL-SCNC: 98 MMOL/L (ref 97–108)
CO2 SERPL-SCNC: 35 MMOL/L (ref 21–32)
CREAT SERPL-MCNC: 0.62 MG/DL (ref 0.7–1.3)
DIFFERENTIAL METHOD BLD: ABNORMAL
EOSINOPHIL # BLD: 0.1 K/UL (ref 0–0.4)
EOSINOPHIL NFR BLD: 0 % (ref 0–7)
ERYTHROCYTE [DISTWIDTH] IN BLOOD BY AUTOMATED COUNT: 13.1 % (ref 11.5–14.5)
GLOBULIN SER CALC-MCNC: 3.2 G/DL (ref 2–4)
GLUCOSE SERPL-MCNC: 106 MG/DL (ref 65–100)
HCT VFR BLD AUTO: 37.7 % (ref 36.6–50.3)
HGB BLD-MCNC: 12.8 G/DL (ref 12.1–17)
IMM GRANULOCYTES # BLD: 0 K/UL (ref 0–0.04)
IMM GRANULOCYTES NFR BLD AUTO: 0 % (ref 0–0.5)
LYMPHOCYTES # BLD: 1.1 K/UL (ref 0.8–3.5)
LYMPHOCYTES NFR BLD: 9 % (ref 12–49)
MAGNESIUM SERPL-MCNC: 1.9 MG/DL (ref 1.6–2.4)
MCH RBC QN AUTO: 31.3 PG (ref 26–34)
MCHC RBC AUTO-ENTMCNC: 34 G/DL (ref 30–36.5)
MCV RBC AUTO: 92.2 FL (ref 80–99)
MONOCYTES # BLD: 1.2 K/UL (ref 0–1)
MONOCYTES NFR BLD: 9 % (ref 5–13)
NEUTS SEG # BLD: 10.5 K/UL (ref 1.8–8)
NEUTS SEG NFR BLD: 81 % (ref 32–75)
NRBC # BLD: 0 K/UL (ref 0–0.01)
NRBC BLD-RTO: 0 PER 100 WBC
PHOSPHATE SERPL-MCNC: 2.9 MG/DL (ref 2.6–4.7)
PLATELET # BLD AUTO: 184 K/UL (ref 150–400)
PMV BLD AUTO: 10.4 FL (ref 8.9–12.9)
POTASSIUM SERPL-SCNC: 3.4 MMOL/L (ref 3.5–5.1)
PROT SERPL-MCNC: 6.4 G/DL (ref 6.4–8.2)
RBC # BLD AUTO: 4.09 M/UL (ref 4.1–5.7)
SODIUM SERPL-SCNC: 138 MMOL/L (ref 136–145)
WBC # BLD AUTO: 12.9 K/UL (ref 4.1–11.1)

## 2018-03-10 PROCEDURE — 36415 COLL VENOUS BLD VENIPUNCTURE: CPT | Performed by: INTERNAL MEDICINE

## 2018-03-10 PROCEDURE — 74011250636 HC RX REV CODE- 250/636: Performed by: PHYSICIAN ASSISTANT

## 2018-03-10 PROCEDURE — 74011250637 HC RX REV CODE- 250/637: Performed by: INTERNAL MEDICINE

## 2018-03-10 PROCEDURE — 84100 ASSAY OF PHOSPHORUS: CPT | Performed by: INTERNAL MEDICINE

## 2018-03-10 PROCEDURE — 83735 ASSAY OF MAGNESIUM: CPT | Performed by: INTERNAL MEDICINE

## 2018-03-10 PROCEDURE — 74011250637 HC RX REV CODE- 250/637: Performed by: SURGERY

## 2018-03-10 PROCEDURE — 65270000029 HC RM PRIVATE

## 2018-03-10 PROCEDURE — 80053 COMPREHEN METABOLIC PANEL: CPT | Performed by: INTERNAL MEDICINE

## 2018-03-10 PROCEDURE — 85025 COMPLETE CBC W/AUTO DIFF WBC: CPT | Performed by: INTERNAL MEDICINE

## 2018-03-10 RX ORDER — POTASSIUM CHLORIDE 750 MG/1
40 TABLET, FILM COATED, EXTENDED RELEASE ORAL
Status: COMPLETED | OUTPATIENT
Start: 2018-03-10 | End: 2018-03-10

## 2018-03-10 RX ADMIN — OXYCODONE AND ACETAMINOPHEN 1 TABLET: 10; 325 TABLET ORAL at 16:23

## 2018-03-10 RX ADMIN — OXYCODONE AND ACETAMINOPHEN 1 TABLET: 10; 325 TABLET ORAL at 00:19

## 2018-03-10 RX ADMIN — OXYCODONE AND ACETAMINOPHEN 1 TABLET: 10; 325 TABLET ORAL at 04:36

## 2018-03-10 RX ADMIN — POTASSIUM CHLORIDE 40 MEQ: 750 TABLET, FILM COATED, EXTENDED RELEASE ORAL at 08:19

## 2018-03-10 RX ADMIN — ALPRAZOLAM 1 MG: 0.5 TABLET ORAL at 22:05

## 2018-03-10 RX ADMIN — Medication 10 ML: at 22:07

## 2018-03-10 RX ADMIN — Medication 10 ML: at 13:35

## 2018-03-10 RX ADMIN — OXYCODONE AND ACETAMINOPHEN 1 TABLET: 10; 325 TABLET ORAL at 12:14

## 2018-03-10 RX ADMIN — ENOXAPARIN SODIUM 40 MG: 40 INJECTION SUBCUTANEOUS at 20:22

## 2018-03-10 RX ADMIN — OXYCODONE AND ACETAMINOPHEN 1 TABLET: 10; 325 TABLET ORAL at 08:19

## 2018-03-10 RX ADMIN — OXYCODONE HYDROCHLORIDE AND ACETAMINOPHEN 1 TABLET: 5; 325 TABLET ORAL at 20:22

## 2018-03-10 RX ADMIN — AMLODIPINE BESYLATE 10 MG: 5 TABLET ORAL at 08:19

## 2018-03-10 RX ADMIN — METOPROLOL SUCCINATE 50 MG: 50 TABLET, FILM COATED, EXTENDED RELEASE ORAL at 16:23

## 2018-03-10 NOTE — ROUTINE PROCESS
Bedside and Verbal shift change report given to Matt Winchester RN (oncoming nurse) by Curtis Hodgkin RN (offgoing nurse). Report included the following information SBAR, Intake/Output, MAR and Recent Results.

## 2018-03-10 NOTE — PROGRESS NOTES
Feeling better  Pain managed with PO pain meds  Passing flatus but no BM  Afebrile and vitals stable  abd soft and benign  Plan for discharge - does not feel comfortable going home today -would like to wait till tomorrow

## 2018-03-10 NOTE — PROGRESS NOTES
Lazaro Foy [facility] [address]  [address]  [phone]      Medical Progress Note      NAME: Sadi Reilly   :  1950  MRM:  [id number]    Date/Time: 3/10/2018          Subjective:     Chief Complaint:  Abdominal pain    Chart/notes/labs/studies reviewed, patient examined at bedside. ABd pain improved. No CP or SOB. +flatus, no BM. Objective:       Vitals:          Last 24hrs VS reviewed since prior progress note. Most recent are:    Visit Vitals    /83 (BP 1 Location: Right arm, BP Patient Position: Sitting; At rest)    Pulse 98    Temp 98.5 °F (36.9 °C)    Resp 16    Ht 5' 9\" (1.753 m)    Wt 66.1 kg (145 lb 11.6 oz)    SpO2 93%    BMI 21.52 kg/m2     SpO2 Readings from Last 6 Encounters:   03/10/18 93%   18 97%   18 100%   18 96%   11/18/15 96%   07/26/15 96%    O2 Flow Rate (L/min): 2 l/min   No intake or output data in the 24 hours ending 03/10/18 1558       Exam:     Physical Exam:    Gen:  Thin, chronically ill-appearing.  NAD  HEENT:  Pink conjunctivae, hearing intact to voice, moist mucous membranes  Neck:  Supple, without masses  Resp:  No accessory muscle use, clear breath sounds without wheezes rales or rhonchi  Card:  No murmurs, normal S1, S2 without thrills, bruits or peripheral edema  Abd:  Soft, non-tender, non-distended, +bowel sounds, abd wounds c/d/i  Musc:  No cyanosis or clubbing  Skin:  No rashes  Neuro:  Cranial nerves 3-12 are grossly intact, follows commands appropriately  Psych:  Good insight, oriented to person, place and time, alert        Medications Reviewed: (see below)    Lab Data Reviewed: (see below)    ______________________________________________________________________    Medications:     Current Facility-Administered Medications   Medication Dose Route Frequency    oxyCODONE-acetaminophen (PERCOCET) 5-325 mg per tablet 1 Tab  1 Tab Oral Q4H PRN    oxyCODONE-acetaminophen (PERCOCET 10)  mg per tablet 1 Tab  1 Tab Oral Q4H PRN    HYDROmorphone (PF) (DILAUDID) injection 0.5 mg  0.5 mg IntraVENous Q3H PRN    alum-mag hydroxide-simeth (MYLANTA) oral suspension 30 mL  30 mL Oral Q4H PRN    enoxaparin (LOVENOX) injection 40 mg  40 mg SubCUTAneous DAILY    sodium chloride (NS) flush 5-10 mL  5-10 mL IntraVENous PRN    sodium chloride (NS) flush 5-10 mL  5-10 mL IntraVENous Q8H    sodium chloride (NS) flush 5-10 mL  5-10 mL IntraVENous PRN    ondansetron (ZOFRAN) injection 4 mg  4 mg IntraVENous Q4H PRN    ALPRAZolam (XANAX) tablet 1 mg  1 mg Oral QHS PRN    amLODIPine (NORVASC) tablet 10 mg  10 mg Oral DAILY    metoprolol succinate (TOPROL-XL) XL tablet 50 mg  50 mg Oral DAILY            Lab Review:     Recent Labs      03/10/18   0213  03/09/18   0605  03/08/18   0337   WBC  12.9*  12.7*  18.5*   HGB  12.8  12.6  12.4   HCT  37.7  39.0  37.7   PLT  184  180  191     Recent Labs      03/10/18   0213  03/09/18   0605  03/08/18   0337   NA  138  138  139   K  3.4*  3.6  3.6   CL  98  100  103   CO2  35*  33*  29   GLU  106*  100  102*   BUN  5*  3*  7   CREA  0.62*  0.64*  0.66*   CA  8.5  8.2*  8.3*   MG  1.9  1.8  1.5*   PHOS  2.9  3.1  4.1   ALB  3.2*   --    --    SGOT  56*   --    --    ALT  87*   --    --      No components found for: GLPOC  No results for input(s): PH, PCO2, PO2, HCO3, FIO2 in the last 72 hours. No results for input(s): INR in the last 72 hours. No lab exists for component: INREXT, INREXT  No results found for: SDES  No results found for: CULT         Assessment / Plan:       Sigmoid diverticulitis: recurrent, s/p sigmoid colectomy. Mgmt per gen surgery, including PT/OT, DVT ppx       HTN: Well controlled. Cont Norvasc and metoprolol. Clonidine on hold       Anxiety: continue Xanax QHS PRN       Hep C: f/u outpatient with hepatology      Mr. [address] is medically stable, will sign off. Please call with further questions.       Total time spent with patient: 15 minutes                  Care Plan discussed with: Patient, Nursing Staff and >50% of time spent in counseling and coordination of care    Discussed:  Care Plan    Prophylaxis:  Lovenox    Disposition:  Home w/Family           ___________________________________________________    Attending Physician: Ever Briseno MD

## 2018-03-11 VITALS
HEART RATE: 86 BPM | TEMPERATURE: 98.7 F | SYSTOLIC BLOOD PRESSURE: 128 MMHG | HEIGHT: 69 IN | OXYGEN SATURATION: 96 % | WEIGHT: 145.72 LBS | RESPIRATION RATE: 16 BRPM | DIASTOLIC BLOOD PRESSURE: 86 MMHG | BODY MASS INDEX: 21.58 KG/M2

## 2018-03-11 LAB — MAGNESIUM SERPL-MCNC: 1.8 MG/DL (ref 1.6–2.4)

## 2018-03-11 PROCEDURE — 74011250637 HC RX REV CODE- 250/637: Performed by: INTERNAL MEDICINE

## 2018-03-11 PROCEDURE — 36415 COLL VENOUS BLD VENIPUNCTURE: CPT | Performed by: SURGERY

## 2018-03-11 PROCEDURE — 74011250637 HC RX REV CODE- 250/637: Performed by: SURGERY

## 2018-03-11 PROCEDURE — 83735 ASSAY OF MAGNESIUM: CPT | Performed by: SURGERY

## 2018-03-11 RX ORDER — OXYCODONE AND ACETAMINOPHEN 7.5; 325 MG/1; MG/1
1 TABLET ORAL
Qty: 40 TAB | Refills: 0 | Status: SHIPPED | OUTPATIENT
Start: 2018-03-11

## 2018-03-11 RX ADMIN — Medication 10 ML: at 06:10

## 2018-03-11 RX ADMIN — AMLODIPINE BESYLATE 10 MG: 5 TABLET ORAL at 08:54

## 2018-03-11 RX ADMIN — OXYCODONE AND ACETAMINOPHEN 1 TABLET: 10; 325 TABLET ORAL at 04:26

## 2018-03-11 RX ADMIN — OXYCODONE AND ACETAMINOPHEN 1 TABLET: 10; 325 TABLET ORAL at 08:58

## 2018-03-11 NOTE — PROGRESS NOTES
Pt dc home with instructions both written and verbal,pt acknowledges understanding. No s/s of distress noted,no c/o pain at this time. Pt to dc area via w/c.

## 2018-03-11 NOTE — ROUTINE PROCESS
Verbal shift change report given to [employer] (oncoming nurse) by Lindley Paget RN (offgoing nurse). Report included the following information SBAR, Kardex and MAR.

## 2018-03-11 NOTE — PROGRESS NOTES
General Surgery Daily Progress Note    Patient: Niall Gilliam MRN: [id number]  SSN: [id number]    YOB: 1950  Age: 79 y.o. Sex: male      Admit Date: 3/7/2018    Subjective:   Patient states that his pain is controlled with percocet  Tolerating diet. Current Facility-Administered Medications   Medication Dose Route Frequency    oxyCODONE-acetaminophen (PERCOCET) 5-325 mg per tablet 1 Tab  1 Tab Oral Q4H PRN    oxyCODONE-acetaminophen (PERCOCET 10)  mg per tablet 1 Tab  1 Tab Oral Q4H PRN    HYDROmorphone (PF) (DILAUDID) injection 0.5 mg  0.5 mg IntraVENous Q3H PRN    alum-mag hydroxide-simeth (MYLANTA) oral suspension 30 mL  30 mL Oral Q4H PRN    enoxaparin (LOVENOX) injection 40 mg  40 mg SubCUTAneous DAILY    sodium chloride (NS) flush 5-10 mL  5-10 mL IntraVENous PRN    sodium chloride (NS) flush 5-10 mL  5-10 mL IntraVENous Q8H    sodium chloride (NS) flush 5-10 mL  5-10 mL IntraVENous PRN    ondansetron (ZOFRAN) injection 4 mg  4 mg IntraVENous Q4H PRN    ALPRAZolam (XANAX) tablet 1 mg  1 mg Oral QHS PRN    amLODIPine (NORVASC) tablet 10 mg  10 mg Oral DAILY    metoprolol succinate (TOPROL-XL) XL tablet 50 mg  50 mg Oral DAILY        Objective:         Patient Vitals for the past 8 hrs:   BP Temp Pulse Resp SpO2   03/11/18 0731 128/86 98.7 °F (37.1 °C) 86 16 96 %   03/11/18 0421 126/80 98.3 °F (36.8 °C) 84 16 94 %       Physical Exam:  General: Alert, cooperative, no distress, appears stated age. Neck:  Supple, symmetrical, trachea midline, no adenopathy, thyroid: no                           enlargement/tenderness/nodules, no carotid bruit and no JVD. Lungs: Clear to auscultation bilaterally. Heart:  Regular rate and rhythm, S1, S2 normal, no murmur, click, rub or gallop. Abdomen: Incisions clean and dry Soft, non-tender. Bowel sounds normal. No masses,  No organomegaly. Extremities: Extremities normal, atraumatic, no cyanosis or edema.   Skin:  Skin color, texture, turgor normal. No rashes or lesions    Labs: Recent Labs      03/10/18   0213   WBC  12.9*   HGB  12.8   HCT  37.7   PLT  184     Recent Labs      03/11/18   0229  03/10/18   0213   NA   --   138   K   --   3.4*   CL   --   98   CO2   --   35*   GLU   --   106*   BUN   --   5*   CREA   --   0.62*   CA   --   8.5   MG  1.8  1.9   PHOS   --   2.9   ALB   --   3.2*   TBILI   --   0.5   SGOT   --   56*   ALT   --   87*     X-ray   Assessment / Plan:   · Ok to discharge  · FU with Dr Ananya Ayala    Active Problems:    Sigmoid diverticulitis (3/7/2018)    prescription for percocet was cancelled by me - patient already has a prescription in the chart from Dr Ananya Ayala

## 2018-03-11 NOTE — DISCHARGE INSTRUCTIONS
Patient Discharge Instructions    Heath Hyde / [id number] : 1950    Admitted 3/7/2018 Discharged: 3/8/2018     Take Home Medications       · It is important that you take the medication exactly as they are prescribed. · Keep your medication in the bottles provided by the pharmacist and keep a list of the medication names, dosages, and times to be taken in your wallet. · Do not take other medications without consulting your doctor. · Do not drive, drink alcohol, or operate machinery when taking sedating pain medication. · Take colace daily while on pain medication to help prevent constipation. You may take over the counter laxatives such as Dulcolax or Miralax as needed for constipation      What to do at Home    Recommended diet: Regular    Recommended activity: No heavy lifting or strenuous activity for 6 weeks. You may shower. You may drive once pain has improved and you no longer require pain medication. Follow up with Dr. Ruddy Daley in 2 weeks. Call [facility] to make an appointment. Call Dr. Ruddy Daley or go to the ER if you develop worsening pain, fever, vomiting, or any other symptoms that concern you.

## 2018-03-12 NOTE — DISCHARGE SUMMARY
Surgery Discharge Summary     Patient ID:  Yu Yeung  [id number]  22 y.o.  1950    Admit date: 3/7/2018    Discharge date and time: 3/11/2018 11:07 AM     Admission Diagnoses:    Patient Active Problem List   Diagnosis Code    Acute diverticulitis K57.92    Sigmoid diverticulitis K57.32       Discharge Diagnoses: There are no discharge diagnoses documented for the most recent discharge. Patient Active Problem List   Diagnosis Code    Acute diverticulitis K57.92    Sigmoid diverticulitis K57.32       Procedures for this admission: Procedure(s):  LAPAROSCOPIC SIGMOID COLECTOMY POSSIBLE OPEN  CYSTOSCOPY URETERAL [address] Course: Mr. Leora Aguirre underwent lap sigmoidectomy on DOA without apparent complication. He had an uneventful postoperative course and was discharged home in stable condition. Disposition: home    Discharged Condition : stable    Instructions: Follow-up in office  in 2 weeks.               Signed:  DRU Michele  3/12/2018  11:29 AM

## 2021-05-08 NOTE — PROGRESS NOTES
Problem: Mobility Impaired (Adult and Pediatric)  Goal: *Acute Goals and Plan of Care (Insert Text)  Physical Therapy Goals  Initiated 3/7/2018  1. Patient will move from supine to sit and sit to supine , scoot up and down and roll side to side in bed with independence within 7 day(s). 2.  Patient will transfer from bed to chair and chair to bed with independence using the least restrictive device within 7 day(s). 3.  Patient will perform sit to stand with independence within 7 day(s). 4.  Patient will ambulate with independence for 200 feet with the least restrictive device within 7 day(s). 5.  Patient will ascend/descend 4 stairs with  handrail(s) with independence within 7 day(s). physical Therapy TREATMENT  Patient: Selena Wilson (24 y.o. male)  Date: 3/9/2018  Diagnosis: DIVERTICULITIS SIGMOID COLON   Sigmoid diverticulitis <principal problem not specified>  Procedure(s) (LRB):  LAPAROSCOPIC SIGMOID COLECTOMY POSSIBLE OPEN (N/A)  CYSTOSCOPY URETERAL STENT INSERTION BILATERAL (Bilateral) 2 Days Post-Op  Precautions:    Chart, physical therapy assessment, plan of care and goals were reviewed. ASSESSMENT:  Pt is progressing well with mobility, currently requiring supervision for safety and for management of lines/leads. He continues to be limited by generalized fatigue and post op pain but mobilizes safely when encouraged. Progression toward goals:  [x]    Improving appropriately and progressing toward goals  []    Improving slowly and progressing toward goals  []    Not making progress toward goals and plan of care will be adjusted     PLAN:  Patient continues to benefit from skilled intervention to address the above impairments. Continue treatment per established plan of care. Discharge Recommendations:  None  Further Equipment Recommendations for Discharge:  None anticipated     SUBJECTIVE:   Patient stated I haven't slept more than 4 hours.  I'm struggling    OBJECTIVE DATA SUMMARY: Critical Behavior:  Neurologic State: Alert  Orientation Level: Oriented X4  Cognition: Appropriate decision making, Appropriate for age attention/concentration, Appropriate safety awareness     Functional Mobility Training:  Bed Mobility:  Rolling: Supervision  Supine to Sit: Supervision  Sit to Supine: Supervision           Transfers:  Sit to Stand: Supervision  Stand to Sit: Supervision        Bed to Chair: Supervision                    Balance:  Sitting: Intact  Standing: Intact  Ambulation/Gait Training:  Distance (ft): 240 Feet (ft)  Assistive Device: Gait belt  Ambulation - Level of Assistance: Supervision                       Speed/Cherie: Slow;Shuffled                                Therapeutic Exercises:   Sit to stand x 5 reps, ankle pumps 2 x 10 reps  Pain:  Pt reported 7/10 abdominal pain. Encouraged pt to use his PCA button as needed for pain control. Activity Tolerance:   Pt tolerated session well with some appropriate increased pain rating during mobility. Vital signs stable throughout session. Please refer to the flowsheet for vital signs taken during this treatment.   After treatment:   []    Patient left in no apparent distress sitting up in chair  [x]    Patient left in no apparent distress in bed  [x]    Call bell left within reach  [x]    Nursing notified  []    Caregiver present  []    Bed alarm activated    COMMUNICATION/COLLABORATION:   The patients plan of care was discussed with: Registered Nurse Leigh Choudhury PT, DPT   Time Calculation: 25 mins family- nephew

## 2022-03-19 PROBLEM — K57.92 ACUTE DIVERTICULITIS: Status: ACTIVE | Noted: 2018-01-18

## 2022-03-20 PROBLEM — K57.32 SIGMOID DIVERTICULITIS: Status: ACTIVE | Noted: 2018-03-07

## 2023-03-29 ENCOUNTER — APPOINTMENT (OUTPATIENT)
Dept: MRI IMAGING | Age: 73
End: 2023-03-29
Attending: INTERNAL MEDICINE
Payer: MEDICARE

## 2023-03-29 ENCOUNTER — APPOINTMENT (OUTPATIENT)
Dept: CT IMAGING | Age: 73
End: 2023-03-29
Attending: EMERGENCY MEDICINE
Payer: MEDICARE

## 2023-03-29 ENCOUNTER — HOSPITAL ENCOUNTER (INPATIENT)
Age: 73
LOS: 2 days | Discharge: HOME HEALTH | End: 2023-03-31
Attending: EMERGENCY MEDICINE | Admitting: INTERNAL MEDICINE
Payer: MEDICARE
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
## 2023-03-29 DIAGNOSIS — R79.89 ELEVATED PTHRP LEVEL: ICD-10-CM

## 2023-03-29 DIAGNOSIS — S06.0X0A CONCUSSION WITHOUT LOSS OF CONSCIOUSNESS, INITIAL ENCOUNTER: ICD-10-CM

## 2023-03-29 DIAGNOSIS — S22.31XA CLOSED FRACTURE OF ONE RIB OF RIGHT SIDE, INITIAL ENCOUNTER: ICD-10-CM

## 2023-03-29 DIAGNOSIS — R56.9 SEIZURE (HCC): ICD-10-CM

## 2023-03-29 DIAGNOSIS — E87.6 HYPOKALEMIA: ICD-10-CM

## 2023-03-29 DIAGNOSIS — S09.90XA CLOSED HEAD INJURY, INITIAL ENCOUNTER: ICD-10-CM

## 2023-03-29 PROBLEM — K57.92 ACUTE DIVERTICULITIS: Status: RESOLVED | Noted: 2018-01-18 | Resolved: 2023-03-29

## 2023-03-29 PROBLEM — E83.52 HYPERCALCEMIA: Status: ACTIVE | Noted: 2023-03-29

## 2023-03-29 PROBLEM — K57.32 SIGMOID DIVERTICULITIS: Status: RESOLVED | Noted: 2018-03-07 | Resolved: 2023-03-29

## 2023-03-29 PROBLEM — R73.9 HYPERGLYCEMIA: Status: ACTIVE | Noted: 2023-03-29

## 2023-03-29 PROBLEM — D72.829 LEUKOCYTOSIS: Status: ACTIVE | Noted: 2023-03-29
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
## 2023-03-29 PROBLEM — S22.39XA RIB FRACTURE: Status: ACTIVE | Noted: 2023-03-29

## 2023-03-29 LAB
ALBUMIN SERPL-MCNC: 4.1 G/DL (ref 3.5–5)
ALBUMIN/GLOB SERPL: 1.1 (ref 1.1–2.2)
ALP SERPL-CCNC: 60 U/L (ref 45–117)
ALT SERPL-CCNC: 22 U/L (ref 12–78)
AMMONIA PLAS-SCNC: 12 UMOL/L
AMPHET UR QL SCN: NEGATIVE
ANION GAP SERPL CALC-SCNC: 12 MMOL/L (ref 5–15)
APPEARANCE UR: CLEAR
AST SERPL-CCNC: 17 U/L (ref 15–37)
BACTERIA URNS QL MICRO: NEGATIVE /HPF
BARBITURATES UR QL SCN: NEGATIVE
BASOPHILS # BLD: 0 K/UL (ref 0–0.1)
BASOPHILS NFR BLD: 0 % (ref 0–1)
BENZODIAZ UR QL: NEGATIVE
BILIRUB SERPL-MCNC: 0.6 MG/DL (ref 0.2–1)
BILIRUB UR QL: NEGATIVE
BUN SERPL-MCNC: 9 MG/DL (ref 6–20)
BUN/CREAT SERPL: 8 (ref 12–20)
CALCIUM SERPL-MCNC: 10.2 MG/DL (ref 8.5–10.1)
CALCIUM SERPL-MCNC: 11.3 MG/DL (ref 8.5–10.1)
CANNABINOIDS UR QL SCN: POSITIVE
CHLORIDE SERPL-SCNC: 105 MMOL/L (ref 97–108)
CK SERPL-CCNC: 626 U/L (ref 39–308)
CO2 SERPL-SCNC: 22 MMOL/L (ref 21–32)
COCAINE UR QL SCN: NEGATIVE
COLOR UR: ABNORMAL
COMMENT, HOLDF: NORMAL
CREAT SERPL-MCNC: 1.19 MG/DL (ref 0.7–1.3)
DIFFERENTIAL METHOD BLD: ABNORMAL
DRUG SCRN COMMENT,DRGCM: ABNORMAL
EOSINOPHIL # BLD: 0 K/UL (ref 0–0.4)
EOSINOPHIL NFR BLD: 0 % (ref 0–7)
EPITH CASTS URNS QL MICRO: ABNORMAL /LPF
ERYTHROCYTE [DISTWIDTH] IN BLOOD BY AUTOMATED COUNT: 12.2 % (ref 11.5–14.5)
EST. AVERAGE GLUCOSE BLD GHB EST-MCNC: 105 MG/DL
ETHANOL SERPL-MCNC: <10 MG/DL
GLOBULIN SER CALC-MCNC: 3.7 G/DL (ref 2–4)
GLUCOSE BLD STRIP.AUTO-MCNC: 132 MG/DL (ref 65–117)
GLUCOSE BLD STRIP.AUTO-MCNC: 159 MG/DL (ref 65–117)
GLUCOSE SERPL-MCNC: 160 MG/DL (ref 65–100)
GLUCOSE UR STRIP.AUTO-MCNC: NEGATIVE MG/DL
HBA1C MFR BLD: 5.3 % (ref 4–5.6)
HCT VFR BLD AUTO: 43.9 % (ref 36.6–50.3)
HGB BLD-MCNC: 15.5 G/DL (ref 12.1–17)
HGB UR QL STRIP: NEGATIVE
HYALINE CASTS URNS QL MICRO: ABNORMAL /LPF (ref 0–2)
IMM GRANULOCYTES # BLD AUTO: 0.1 K/UL (ref 0–0.04)
IMM GRANULOCYTES NFR BLD AUTO: 0 % (ref 0–0.5)
INR PPP: 1 (ref 0.9–1.1)
KETONES UR QL STRIP.AUTO: 40 MG/DL
LEUKOCYTE ESTERASE UR QL STRIP.AUTO: ABNORMAL
LIPASE SERPL-CCNC: 136 U/L (ref 73–393)
LYMPHOCYTES # BLD: 1.5 K/UL (ref 0.8–3.5)
LYMPHOCYTES NFR BLD: 8 % (ref 12–49)
MAGNESIUM SERPL-MCNC: 2.4 MG/DL (ref 1.6–2.4)
MCH RBC QN AUTO: 31.8 PG (ref 26–34)
MCHC RBC AUTO-ENTMCNC: 35.3 G/DL (ref 30–36.5)
MCV RBC AUTO: 90 FL (ref 80–99)
METHADONE UR QL: NEGATIVE
MONOCYTES # BLD: 1 K/UL (ref 0–1)
MONOCYTES NFR BLD: 6 % (ref 5–13)
NEUTS SEG # BLD: 15.6 K/UL (ref 1.8–8)
NEUTS SEG NFR BLD: 86 % (ref 32–75)
NITRITE UR QL STRIP.AUTO: NEGATIVE
NRBC # BLD: 0 K/UL (ref 0–0.01)
NRBC BLD-RTO: 0 PER 100 WBC
OPIATES UR QL: NEGATIVE
PCP UR QL: NEGATIVE
PH UR STRIP: 6.5 (ref 5–8)
PLATELET # BLD AUTO: 196 K/UL (ref 150–400)
PMV BLD AUTO: 11.4 FL (ref 8.9–12.9)
POTASSIUM SERPL-SCNC: 2.8 MMOL/L (ref 3.5–5.1)
PROCALCITONIN SERPL-MCNC: <0.05 NG/ML
PROT SERPL-MCNC: 7.8 G/DL (ref 6.4–8.2)
PROT UR STRIP-MCNC: NEGATIVE MG/DL
PROTHROMBIN TIME: 10.6 SEC (ref 9–11.1)
PTH-INTACT SERPL-MCNC: 101.6 PG/ML (ref 18.4–88)
RBC # BLD AUTO: 4.88 M/UL (ref 4.1–5.7)
RBC #/AREA URNS HPF: ABNORMAL /HPF (ref 0–5)
SAMPLES BEING HELD,HOLD: NORMAL
SERVICE CMNT-IMP: ABNORMAL
SERVICE CMNT-IMP: ABNORMAL
SODIUM SERPL-SCNC: 139 MMOL/L (ref 136–145)
SP GR UR REFRACTOMETRY: 1.01 (ref 1–1.03)
UROBILINOGEN UR QL STRIP.AUTO: 0.2 EU/DL (ref 0.2–1)
WBC # BLD AUTO: 18.3 K/UL (ref 4.1–11.1)
WBC URNS QL MICRO: ABNORMAL /HPF (ref 0–4)

## 2023-03-29 PROCEDURE — 70496 CT ANGIOGRAPHY HEAD: CPT

## 2023-03-29 PROCEDURE — 74011250636 HC RX REV CODE- 250/636: Performed by: HOSPITALIST

## 2023-03-29 PROCEDURE — 83735 ASSAY OF MAGNESIUM: CPT

## 2023-03-29 PROCEDURE — 74011250636 HC RX REV CODE- 250/636

## 2023-03-29 PROCEDURE — 74011250636 HC RX REV CODE- 250/636: Performed by: INTERNAL MEDICINE

## 2023-03-29 PROCEDURE — 82550 ASSAY OF CK (CPK): CPT

## 2023-03-29 PROCEDURE — 71250 CT THORAX DX C-: CPT

## 2023-03-29 PROCEDURE — 81001 URINALYSIS AUTO W/SCOPE: CPT

## 2023-03-29 PROCEDURE — 82962 GLUCOSE BLOOD TEST: CPT

## 2023-03-29 PROCEDURE — 83036 HEMOGLOBIN GLYCOSYLATED A1C: CPT

## 2023-03-29 PROCEDURE — 82607 VITAMIN B-12: CPT

## 2023-03-29 PROCEDURE — 93005 ELECTROCARDIOGRAM TRACING: CPT

## 2023-03-29 PROCEDURE — 74011250636 HC RX REV CODE- 250/636: Performed by: EMERGENCY MEDICINE

## 2023-03-29 PROCEDURE — 36415 COLL VENOUS BLD VENIPUNCTURE: CPT

## 2023-03-29 PROCEDURE — 72131 CT LUMBAR SPINE W/O DYE: CPT

## 2023-03-29 PROCEDURE — 83970 ASSAY OF PARATHORMONE: CPT

## 2023-03-29 PROCEDURE — 82746 ASSAY OF FOLIC ACID SERUM: CPT

## 2023-03-29 PROCEDURE — 82140 ASSAY OF AMMONIA: CPT

## 2023-03-29 PROCEDURE — 83690 ASSAY OF LIPASE: CPT

## 2023-03-29 PROCEDURE — 96375 TX/PRO/DX INJ NEW DRUG ADDON: CPT

## 2023-03-29 PROCEDURE — 96365 THER/PROPH/DIAG IV INF INIT: CPT

## 2023-03-29 PROCEDURE — 74011000636 HC RX REV CODE- 636: Performed by: INTERNAL MEDICINE

## 2023-03-29 PROCEDURE — 80053 COMPREHEN METABOLIC PANEL: CPT

## 2023-03-29 PROCEDURE — 85025 COMPLETE CBC W/AUTO DIFF WBC: CPT

## 2023-03-29 PROCEDURE — 99285 EMERGENCY DEPT VISIT HI MDM: CPT

## 2023-03-29 PROCEDURE — 82077 ASSAY SPEC XCP UR&BREATH IA: CPT

## 2023-03-29 PROCEDURE — 65270000029 HC RM PRIVATE

## 2023-03-29 PROCEDURE — 84145 PROCALCITONIN (PCT): CPT

## 2023-03-29 PROCEDURE — 85610 PROTHROMBIN TIME: CPT

## 2023-03-29 PROCEDURE — 95816 EEG AWAKE AND DROWSY: CPT | Performed by: INTERNAL MEDICINE

## 2023-03-29 PROCEDURE — 80307 DRUG TEST PRSMV CHEM ANLYZR: CPT

## 2023-03-29 PROCEDURE — 96361 HYDRATE IV INFUSION ADD-ON: CPT

## 2023-03-29 PROCEDURE — 70450 CT HEAD/BRAIN W/O DYE: CPT

## 2023-03-29 PROCEDURE — 96374 THER/PROPH/DIAG INJ IV PUSH: CPT

## 2023-03-29 RX ORDER — ACETAMINOPHEN 325 MG/1
650 TABLET ORAL
Status: DISCONTINUED | OUTPATIENT
Start: 2023-03-29 | End: 2023-03-31 | Stop reason: HOSPADM

## 2023-03-29 RX ORDER — IBUPROFEN 200 MG
4 TABLET ORAL AS NEEDED
Status: DISCONTINUED | OUTPATIENT
Start: 2023-03-29 | End: 2023-03-31 | Stop reason: HOSPADM

## 2023-03-29 RX ORDER — ONDANSETRON 2 MG/ML
4 INJECTION INTRAMUSCULAR; INTRAVENOUS ONCE
Status: COMPLETED | OUTPATIENT
Start: 2023-03-29 | End: 2023-03-29

## 2023-03-29 RX ORDER — ONDANSETRON 2 MG/ML
4 INJECTION INTRAMUSCULAR; INTRAVENOUS
Status: DISCONTINUED | OUTPATIENT
Start: 2023-03-29 | End: 2023-03-31 | Stop reason: HOSPADM

## 2023-03-29 RX ORDER — LORAZEPAM 2 MG/ML
1 INJECTION INTRAMUSCULAR ONCE
Status: COMPLETED | OUTPATIENT
Start: 2023-03-29 | End: 2023-03-29

## 2023-03-29 RX ORDER — SODIUM CHLORIDE 0.9 % (FLUSH) 0.9 %
5-40 SYRINGE (ML) INJECTION AS NEEDED
Status: DISCONTINUED | OUTPATIENT
Start: 2023-03-29 | End: 2023-03-31 | Stop reason: HOSPADM

## 2023-03-29 RX ORDER — ACETAMINOPHEN 650 MG/1
650 SUPPOSITORY RECTAL
Status: DISCONTINUED | OUTPATIENT
Start: 2023-03-29 | End: 2023-03-31 | Stop reason: HOSPADM

## 2023-03-29 RX ORDER — ALPRAZOLAM 0.5 MG/1
1 TABLET ORAL
Status: DISCONTINUED | OUTPATIENT
Start: 2023-03-29 | End: 2023-03-31 | Stop reason: HOSPADM

## 2023-03-29 RX ORDER — AMLODIPINE BESYLATE 5 MG/1
10 TABLET ORAL DAILY
Status: DISCONTINUED | OUTPATIENT
Start: 2023-03-30 | End: 2023-03-31 | Stop reason: HOSPADM

## 2023-03-29 RX ORDER — NALOXONE HYDROCHLORIDE 0.4 MG/ML
0.4 INJECTION, SOLUTION INTRAMUSCULAR; INTRAVENOUS; SUBCUTANEOUS AS NEEDED
Status: DISCONTINUED | OUTPATIENT
Start: 2023-03-29 | End: 2023-03-31 | Stop reason: HOSPADM

## 2023-03-29 RX ORDER — DEXTROSE MONOHYDRATE 100 MG/ML
0-250 INJECTION, SOLUTION INTRAVENOUS AS NEEDED
Status: DISCONTINUED | OUTPATIENT
Start: 2023-03-29 | End: 2023-03-31 | Stop reason: HOSPADM

## 2023-03-29 RX ORDER — INSULIN LISPRO 100 [IU]/ML
INJECTION, SOLUTION INTRAVENOUS; SUBCUTANEOUS
Status: DISCONTINUED | OUTPATIENT
Start: 2023-03-29 | End: 2023-03-31 | Stop reason: HOSPADM

## 2023-03-29 RX ORDER — HALOPERIDOL 5 MG/ML
5 INJECTION INTRAMUSCULAR
Status: DISCONTINUED | OUTPATIENT
Start: 2023-03-29 | End: 2023-03-31 | Stop reason: HOSPADM

## 2023-03-29 RX ORDER — POLYETHYLENE GLYCOL 3350 17 G/17G
17 POWDER, FOR SOLUTION ORAL DAILY PRN
Status: DISCONTINUED | OUTPATIENT
Start: 2023-03-29 | End: 2023-03-31 | Stop reason: HOSPADM

## 2023-03-29 RX ORDER — LORAZEPAM 2 MG/ML
1 INJECTION INTRAMUSCULAR
Status: COMPLETED | OUTPATIENT
Start: 2023-03-29 | End: 2023-03-29

## 2023-03-29 RX ORDER — POTASSIUM CHLORIDE 7.45 MG/ML
10 INJECTION INTRAVENOUS ONCE
Status: COMPLETED | OUTPATIENT
Start: 2023-03-29 | End: 2023-03-30

## 2023-03-29 RX ORDER — ASCORBIC ACID 500 MG
500 TABLET ORAL DAILY
Status: DISCONTINUED | OUTPATIENT
Start: 2023-03-30 | End: 2023-03-31 | Stop reason: HOSPADM

## 2023-03-29 RX ORDER — SODIUM CHLORIDE AND POTASSIUM CHLORIDE 300; 900 MG/100ML; MG/100ML
INJECTION, SOLUTION INTRAVENOUS CONTINUOUS
Status: DISCONTINUED | OUTPATIENT
Start: 2023-03-29 | End: 2023-03-31

## 2023-03-29 RX ORDER — SODIUM CHLORIDE 0.9 % (FLUSH) 0.9 %
5-40 SYRINGE (ML) INJECTION EVERY 8 HOURS
Status: DISCONTINUED | OUTPATIENT
Start: 2023-03-29 | End: 2023-03-31 | Stop reason: HOSPADM

## 2023-03-29 RX ORDER — LORAZEPAM 2 MG/ML
1 INJECTION INTRAMUSCULAR
Status: DISCONTINUED | OUTPATIENT
Start: 2023-03-29 | End: 2023-03-31 | Stop reason: HOSPADM

## 2023-03-29 RX ORDER — CLONIDINE HYDROCHLORIDE 0.1 MG/1
0.2 TABLET ORAL
Status: DISCONTINUED | OUTPATIENT
Start: 2023-03-29 | End: 2023-03-31 | Stop reason: HOSPADM

## 2023-03-29 RX ORDER — LEVETIRACETAM 500 MG/5ML
1000 INJECTION, SOLUTION, CONCENTRATE INTRAVENOUS ONCE
Status: COMPLETED | OUTPATIENT
Start: 2023-03-29 | End: 2023-03-29

## 2023-03-29 RX ORDER — HALOPERIDOL 5 MG/ML
INJECTION INTRAMUSCULAR
Status: DISPENSED
Start: 2023-03-29 | End: 2023-03-30

## 2023-03-29 RX ORDER — OXYCODONE HYDROCHLORIDE 5 MG/1
5 TABLET ORAL
Status: DISCONTINUED | OUTPATIENT
Start: 2023-03-29 | End: 2023-03-31 | Stop reason: HOSPADM

## 2023-03-29 RX ORDER — HALOPERIDOL 5 MG/ML
5 INJECTION INTRAMUSCULAR ONCE
Status: COMPLETED | OUTPATIENT
Start: 2023-03-29 | End: 2023-03-29

## 2023-03-29 RX ORDER — ACETAMINOPHEN 325 MG/1
650 TABLET ORAL EVERY 6 HOURS
Status: DISCONTINUED | OUTPATIENT
Start: 2023-03-29 | End: 2023-03-31 | Stop reason: HOSPADM

## 2023-03-29 RX ORDER — MORPHINE SULFATE 2 MG/ML
2 INJECTION, SOLUTION INTRAMUSCULAR; INTRAVENOUS
Status: DISCONTINUED | OUTPATIENT
Start: 2023-03-29 | End: 2023-03-30

## 2023-03-29 RX ORDER — KETOROLAC TROMETHAMINE 30 MG/ML
15 INJECTION, SOLUTION INTRAMUSCULAR; INTRAVENOUS
Status: COMPLETED | OUTPATIENT
Start: 2023-03-29 | End: 2023-03-29

## 2023-03-29 RX ORDER — METOPROLOL SUCCINATE 50 MG/1
50 TABLET, EXTENDED RELEASE ORAL EVERY EVENING
Status: DISCONTINUED | OUTPATIENT
Start: 2023-03-30 | End: 2023-03-31 | Stop reason: HOSPADM

## 2023-03-29 RX ORDER — HYDRALAZINE HYDROCHLORIDE 20 MG/ML
10 INJECTION INTRAMUSCULAR; INTRAVENOUS
Status: DISCONTINUED | OUTPATIENT
Start: 2023-03-29 | End: 2023-03-31 | Stop reason: HOSPADM

## 2023-03-29 RX ORDER — OXYCODONE AND ACETAMINOPHEN 7.5; 325 MG/1; MG/1
1 TABLET ORAL
Status: DISCONTINUED | OUTPATIENT
Start: 2023-03-29 | End: 2023-03-29

## 2023-03-29 RX ORDER — LORAZEPAM 2 MG/ML
INJECTION INTRAMUSCULAR
Status: COMPLETED
Start: 2023-03-29 | End: 2023-03-29

## 2023-03-29 RX ORDER — ONDANSETRON 4 MG/1
4 TABLET, ORALLY DISINTEGRATING ORAL
Status: DISCONTINUED | OUTPATIENT
Start: 2023-03-29 | End: 2023-03-31 | Stop reason: HOSPADM

## 2023-03-29 RX ADMIN — LEVETIRACETAM 1000 MG: 100 INJECTION, SOLUTION INTRAVENOUS at 20:11

## 2023-03-29 RX ADMIN — POTASSIUM CHLORIDE AND SODIUM CHLORIDE: 900; 300 INJECTION, SOLUTION INTRAVENOUS at 22:28

## 2023-03-29 RX ADMIN — HALOPERIDOL LACTATE 5 MG: 5 INJECTION, SOLUTION INTRAMUSCULAR at 20:22

## 2023-03-29 RX ADMIN — KETOROLAC TROMETHAMINE 15 MG: 30 INJECTION, SOLUTION INTRAMUSCULAR at 19:14

## 2023-03-29 RX ADMIN — LORAZEPAM 1 MG: 2 INJECTION INTRAMUSCULAR; INTRAVENOUS at 20:03

## 2023-03-29 RX ADMIN — ONDANSETRON 4 MG: 2 INJECTION INTRAMUSCULAR; INTRAVENOUS at 17:47

## 2023-03-29 RX ADMIN — LORAZEPAM 1 MG: 2 INJECTION INTRAMUSCULAR at 20:03

## 2023-03-29 RX ADMIN — IOPAMIDOL 85 ML: 755 INJECTION, SOLUTION INTRAVENOUS at 21:34

## 2023-03-29 RX ADMIN — LORAZEPAM 1 MG: 2 INJECTION INTRAMUSCULAR; INTRAVENOUS at 21:14

## 2023-03-29 RX ADMIN — POTASSIUM CHLORIDE 10 MEQ: 7.46 INJECTION, SOLUTION INTRAVENOUS at 19:11

## 2023-03-29 RX ADMIN — SODIUM CHLORIDE 1000 ML: 9 INJECTION, SOLUTION INTRAVENOUS at 17:49

## 2023-03-29 NOTE — ED PROVIDER NOTES
77-year-old male presents from home via EMS with altered mental status fall. Per EMS, the patient's been spending most of the day in bed today. Not acting himself or feeling well. Then this evening he got up to go to the bathroom and the wife heard a loud noise as he fell in the bathroom. He was rolling on the floor moaning when EMS arrived. This is vital signs were normal and his Accu-Chek was 125. No obvious evidence of trauma and patient denied hitting his head. His only complaint is pain across his lower back. Past medical history significant for hepatitis, hypertension, pancreatitis, diverticulitis. Past Medical History:   Diagnosis Date    Diverticulitis 2018    Hepatitis C 1992    Hypertension     Pancreatitis 2016       Past Surgical History:   Procedure Laterality Date    COLONOSCOPY N/A 2/23/2018    COLONOSCOPY performed by Gloria Miranda MD at [address] [place] [address]         Family History:   Problem Relation Age of Onset    Cancer Father        Social History     Socioeconomic History    Marital status:      Spouse name: Not on file    Number of children: Not on file    Years of education: Not on file    Highest education level: Not on file   Occupational History    Not on file   Tobacco Use    Smoking status: Never    Smokeless tobacco: Never   Substance and Sexual Activity    Alcohol use: No    Drug use: Yes     Types: Marijuana     Comment: 1 month ago recreational    Sexual activity: Not on file   Other Topics Concern    Not on file   Social History Narrative    Not on file     Social Determinants of Health     Financial Resource Strain: Not on file   Food Insecurity: Not on file   Transportation Needs: Not on file   Physical Activity: Not on file   Stress: Not on file   Social Connections: Not on file   Intimate Partner Violence: Not on file   Housing Stability: Not on file         ALLERGIES: Patient has no known allergies.     Review of Systems   Constitutional: Negative for diaphoresis and fever. HENT:  Negative for facial swelling. Eyes:  Negative for visual disturbance. Respiratory:  Negative for cough. Cardiovascular:  Negative for chest pain. Gastrointestinal:  Negative for abdominal pain. Genitourinary:  Negative for dysuria. Musculoskeletal:  Negative for joint swelling. Skin:  Negative for rash. Neurological:  Negative for headaches. Hematological:  Negative for adenopathy. Psychiatric/Behavioral:  Negative for suicidal ideas. Vitals:    03/29/23 1737 03/29/23 1812   BP: (!) 140/85    Pulse: 83 89   Resp: (!) 34 18   Temp: 98.3 °F (36.8 °C)    SpO2: 100% 99%   Weight: 64.4 kg (142 lb)    Height: 5' 9\" (1.753 m)             Physical Exam  Vitals and nursing note reviewed. Constitutional:       General: He is not in acute distress. Appearance: He is well-developed. He is ill-appearing. Comments: agitated   HENT:      Head: Normocephalic and atraumatic. Eyes:      Pupils: Pupils are equal, round, and reactive to light. Cardiovascular:      Rate and Rhythm: Normal rate. Pulmonary:      Effort: Pulmonary effort is normal. No respiratory distress. Abdominal:      General: There is no distension. Musculoskeletal:         General: Normal range of motion. Cervical back: Normal range of motion and neck supple. Skin:     General: Skin is warm and dry. Neurological:      General: No focal deficit present. Mental Status: He is alert and oriented to person, place, and time. Medical Decision Making  Assessment: Patient seen here today after a ground-level fall in the bathroom. This happened several hours prior to arrival.  Patient's spouse says that he has not been feeling well or acting himself all day ever since she started taking the medicine for his hepatitis C that he ordered online. Patient is not aware what this medication is. His work-up in the ED was largely unremarkable.   Except for potassium of two-point 8-1/10 rib fracture on the right side. Head CT and other labs are unremarkable. We were going over results in the exam room when patient developed generalized seizure activity that lasted about 1 minute. He received 1 mg of Ativan IV before seizure activity stopped. Glucose at the time was 159. Loading dose of Keppra was ordered and I added on a CTA of his head and neck. Patient is currently postictal.  Plan to admit to the hospital service. Amount and/or Complexity of Data Reviewed  Labs: ordered. Radiology: ordered. ECG/medicine tests: ordered. Decision-making details documented in ED Course. Risk  Prescription drug management. Decision regarding hospitalization. ED Course as of 03/29/23 2005   Wed Mar 29, 2023   1741 EKG, 12 LEAD, INITIAL  ED EKG interpretation:  Rhythm: normal sinus rhythm. Rate (approx.): 86. Axis: normal.  ST segment:  No concerning ST elevations or depressions. This EKG was independently interpreted by Lashon Austin MD,ED Provider. [JM]      ED Course User Index  [JM] Justin Crane MD     Perfect Serve Consult for Admission  7:50 PM    ED Room Number: ER10/10  Patient Name and age: Olivier Ramirez 67 y.o.  male  Working Diagnosis:   1. Fall from ground level    2. Closed head injury, initial encounter    3. Seizure ([address]    4. Hypokalemia    5. Closed fracture of one rib of right side, initial encounter        COVID-19 Suspicion:  no  Sepsis present:  no  Reassessment needed: no  Code Status:  Full Code  Readmission: no  Isolation Requirements:  no  Recommended Level of Care:  ICU  Department: [facility] ED - [phone]  Admitting Provider:     Other:  fell in bathroom today. C/o back and rib pain. Work up in ED mostly unremarkable except K=2.8.  then patient had generalized seizure lasting one minutes. Got Ativan 1mg IV and Keppra 1000mg. No h/o seizure disorder. No reported alcohol abuse.     Total critical care time spent exclusive of procedures:  35 minutes.     Procedures

## 2023-03-29 NOTE — ED TRIAGE NOTES
Pt to ER via EMS for GLF found down by wife. Unknown last known well by wife but wife did state he has not been himself, altered all day. Pt does not remember the fall and does not remember if he hit his head. Pt denies chest pain does report SOB. Reports head pain.

## 2023-03-30 ENCOUNTER — APPOINTMENT (OUTPATIENT)
Dept: NON INVASIVE DIAGNOSTICS | Age: 73
End: 2023-03-30
Attending: INTERNAL MEDICINE
Payer: MEDICARE

## 2023-03-30 ENCOUNTER — APPOINTMENT (OUTPATIENT)
Dept: GENERAL RADIOLOGY | Age: 73
End: 2023-03-30
Attending: HOSPITALIST
Payer: MEDICARE

## 2023-03-30 LAB
ANION GAP SERPL CALC-SCNC: 6 MMOL/L (ref 5–15)
ATRIAL RATE: 86 BPM
BASOPHILS # BLD: 0 K/UL (ref 0–0.1)
BASOPHILS NFR BLD: 0 % (ref 0–1)
BUN SERPL-MCNC: 7 MG/DL (ref 6–20)
BUN/CREAT SERPL: 8 (ref 12–20)
CALCIUM SERPL-MCNC: 9.7 MG/DL (ref 8.5–10.1)
CALCULATED P AXIS, ECG09: 56 DEGREES
CALCULATED R AXIS, ECG10: 50 DEGREES
CALCULATED T AXIS, ECG11: 36 DEGREES
CHLORIDE SERPL-SCNC: 108 MMOL/L (ref 97–108)
CO2 SERPL-SCNC: 27 MMOL/L (ref 21–32)
CREAT SERPL-MCNC: 0.85 MG/DL (ref 0.7–1.3)
DIAGNOSIS, 93000: NORMAL
DIFFERENTIAL METHOD BLD: ABNORMAL
ECHO AO ASC DIAM: 3.9 CM
ECHO AO ASCENDING AORTA INDEX: 2.18 CM/M2
ECHO AV AREA PEAK VELOCITY: 4.1 CM2
ECHO AV AREA VTI: 3.8 CM2
ECHO AV AREA/BSA PEAK VELOCITY: 2.3 CM2/M2
ECHO AV AREA/BSA VTI: 2.1 CM2/M2
ECHO AV MEAN GRADIENT: 5 MMHG
ECHO AV MEAN VELOCITY: 1 M/S
ECHO AV PEAK GRADIENT: 10 MMHG
ECHO AV PEAK VELOCITY: 1.6 M/S
ECHO AV VELOCITY RATIO: 1
ECHO AV VTI: 29.4 CM
ECHO LA DIAMETER INDEX: 1.51 CM/M2
ECHO LA DIAMETER: 2.7 CM
ECHO LA VOL 2C: 40 ML (ref 18–58)
ECHO LA VOL 4C: 34 ML (ref 18–58)
ECHO LA VOL BP: 37 ML (ref 18–58)
ECHO LA VOL/BSA BIPLANE: 21 ML/M2 (ref 16–34)
ECHO LA VOLUME AREA LENGTH: 42 ML
ECHO LA VOLUME INDEX A2C: 22 ML/M2 (ref 16–34)
ECHO LA VOLUME INDEX A4C: 19 ML/M2 (ref 16–34)
ECHO LA VOLUME INDEX AREA LENGTH: 23 ML/M2 (ref 16–34)
ECHO LV E' LATERAL VELOCITY: 15 CM/S
ECHO LV E' SEPTAL VELOCITY: 9 CM/S
ECHO LV EDV A2C: 61 ML
ECHO LV EDV A4C: 68 ML
ECHO LV EDV BP: 65 ML (ref 67–155)
ECHO LV EDV INDEX A4C: 38 ML/M2
ECHO LV EDV INDEX BP: 36 ML/M2
ECHO LV EDV NDEX A2C: 34 ML/M2
ECHO LV EJECTION FRACTION A2C: 72 %
ECHO LV EJECTION FRACTION A4C: 68 %
ECHO LV EJECTION FRACTION BIPLANE: 69 % (ref 55–100)
ECHO LV ESV A2C: 17 ML
ECHO LV ESV A4C: 22 ML
ECHO LV ESV BP: 20 ML (ref 22–58)
ECHO LV ESV INDEX A2C: 9 ML/M2
ECHO LV ESV INDEX A4C: 12 ML/M2
ECHO LV ESV INDEX BP: 11 ML/M2
ECHO LV FRACTIONAL SHORTENING: 29 % (ref 28–44)
ECHO LV INTERNAL DIMENSION DIASTOLE INDEX: 2.51 CM/M2
ECHO LV INTERNAL DIMENSION DIASTOLIC: 4.5 CM (ref 4.2–5.9)
ECHO LV INTERNAL DIMENSION SYSTOLIC INDEX: 1.79 CM/M2
ECHO LV INTERNAL DIMENSION SYSTOLIC: 3.2 CM
ECHO LV IVSD: 0.8 CM (ref 0.6–1)
ECHO LV MASS 2D: 123.1 G (ref 88–224)
ECHO LV MASS INDEX 2D: 68.8 G/M2 (ref 49–115)
ECHO LV POSTERIOR WALL DIASTOLIC: 0.9 CM (ref 0.6–1)
ECHO LV RELATIVE WALL THICKNESS RATIO: 0.4
ECHO LVOT AREA: 4.2 CM2
ECHO LVOT AV VTI INDEX: 0.95
ECHO LVOT DIAM: 2.3 CM
ECHO LVOT MEAN GRADIENT: 5 MMHG
ECHO LVOT PEAK GRADIENT: 10 MMHG
ECHO LVOT PEAK VELOCITY: 1.6 M/S
ECHO LVOT STROKE VOLUME INDEX: 64.7 ML/M2
ECHO LVOT SV: 115.9 ML
ECHO LVOT VTI: 27.9 CM
ECHO MV A VELOCITY: 0.96 M/S
ECHO MV E DECELERATION TIME (DT): 208.2 MS
ECHO MV E VELOCITY: 0.62 M/S
ECHO MV E/A RATIO: 0.65
ECHO MV E/E' LATERAL: 4.13
ECHO MV E/E' RATIO (AVERAGED): 5.51
ECHO MV E/E' SEPTAL: 6.89
ECHO PV MAX VELOCITY: 0.9 M/S
ECHO PV PEAK GRADIENT: 3 MMHG
ECHO RV INTERNAL DIMENSION: 3.7 CM
ECHO RV TAPSE: 2.9 CM (ref 1.7–?)
ECHO RVOT PEAK GRADIENT: 3 MMHG
ECHO RVOT PEAK VELOCITY: 0.8 M/S
ECHO TV REGURGITANT MAX VELOCITY: 2.68 M/S
ECHO TV REGURGITANT PEAK GRADIENT: 29 MMHG
EOSINOPHIL # BLD: 0 K/UL (ref 0–0.4)
EOSINOPHIL NFR BLD: 0 % (ref 0–7)
ERYTHROCYTE [DISTWIDTH] IN BLOOD BY AUTOMATED COUNT: 12 % (ref 11.5–14.5)
FOLATE SERPL-MCNC: 51.6 NG/ML (ref 5–21)
GLUCOSE BLD STRIP.AUTO-MCNC: 88 MG/DL (ref 65–117)
GLUCOSE BLD STRIP.AUTO-MCNC: 90 MG/DL (ref 65–117)
GLUCOSE BLD STRIP.AUTO-MCNC: 97 MG/DL (ref 65–117)
GLUCOSE SERPL-MCNC: 147 MG/DL (ref 65–100)
HCT VFR BLD AUTO: 40.3 % (ref 36.6–50.3)
HGB BLD-MCNC: 14 G/DL (ref 12.1–17)
IMM GRANULOCYTES # BLD AUTO: 0 K/UL (ref 0–0.04)
IMM GRANULOCYTES NFR BLD AUTO: 0 % (ref 0–0.5)
LYMPHOCYTES # BLD: 0.7 K/UL (ref 0.8–3.5)
LYMPHOCYTES NFR BLD: 4 % (ref 12–49)
MAGNESIUM SERPL-MCNC: 2 MG/DL (ref 1.6–2.4)
MCH RBC QN AUTO: 31.1 PG (ref 26–34)
MCHC RBC AUTO-ENTMCNC: 34.7 G/DL (ref 30–36.5)
MCV RBC AUTO: 89.6 FL (ref 80–99)
MONOCYTES # BLD: 1.4 K/UL (ref 0–1)
MONOCYTES NFR BLD: 8 % (ref 5–13)
NEUTS SEG # BLD: 15.4 K/UL (ref 1.8–8)
NEUTS SEG NFR BLD: 88 % (ref 32–75)
NRBC # BLD: 0 K/UL (ref 0–0.01)
NRBC BLD-RTO: 0 PER 100 WBC
P-R INTERVAL, ECG05: 174 MS
PLATELET # BLD AUTO: 167 K/UL (ref 150–400)
PMV BLD AUTO: 11.6 FL (ref 8.9–12.9)
POTASSIUM SERPL-SCNC: 3 MMOL/L (ref 3.5–5.1)
Q-T INTERVAL, ECG07: 394 MS
QRS DURATION, ECG06: 90 MS
QTC CALCULATION (BEZET), ECG08: 471 MS
RBC # BLD AUTO: 4.5 M/UL (ref 4.1–5.7)
RBC MORPH BLD: ABNORMAL
SERVICE CMNT-IMP: NORMAL
SODIUM SERPL-SCNC: 141 MMOL/L (ref 136–145)
VENTRICULAR RATE, ECG03: 86 BPM
VIT B12 SERPL-MCNC: 718 PG/ML (ref 193–986)
WBC # BLD AUTO: 17.5 K/UL (ref 4.1–11.1)

## 2023-03-30 PROCEDURE — 73630 X-RAY EXAM OF FOOT: CPT

## 2023-03-30 PROCEDURE — 85025 COMPLETE CBC W/AUTO DIFF WBC: CPT

## 2023-03-30 PROCEDURE — 36415 COLL VENOUS BLD VENIPUNCTURE: CPT

## 2023-03-30 PROCEDURE — 97165 OT EVAL LOW COMPLEX 30 MIN: CPT

## 2023-03-30 PROCEDURE — 74011250636 HC RX REV CODE- 250/636: Performed by: HOSPITALIST

## 2023-03-30 PROCEDURE — 65270000046 HC RM TELEMETRY

## 2023-03-30 PROCEDURE — 74011000250 HC RX REV CODE- 250: Performed by: INTERNAL MEDICINE

## 2023-03-30 PROCEDURE — 99221 1ST HOSP IP/OBS SF/LOW 40: CPT | Performed by: PSYCHIATRY & NEUROLOGY

## 2023-03-30 PROCEDURE — 83735 ASSAY OF MAGNESIUM: CPT

## 2023-03-30 PROCEDURE — 74011250637 HC RX REV CODE- 250/637: Performed by: INTERNAL MEDICINE

## 2023-03-30 PROCEDURE — 74011250637 HC RX REV CODE- 250/637: Performed by: HOSPITALIST

## 2023-03-30 PROCEDURE — 74011000250 HC RX REV CODE- 250: Performed by: HOSPITALIST

## 2023-03-30 PROCEDURE — 82962 GLUCOSE BLOOD TEST: CPT

## 2023-03-30 PROCEDURE — 95819 EEG AWAKE AND ASLEEP: CPT | Performed by: PSYCHIATRY & NEUROLOGY

## 2023-03-30 PROCEDURE — 93306 TTE W/DOPPLER COMPLETE: CPT

## 2023-03-30 PROCEDURE — 97535 SELF CARE MNGMENT TRAINING: CPT

## 2023-03-30 PROCEDURE — 74011250636 HC RX REV CODE- 250/636: Performed by: INTERNAL MEDICINE

## 2023-03-30 PROCEDURE — 93306 TTE W/DOPPLER COMPLETE: CPT | Performed by: SPECIALIST

## 2023-03-30 PROCEDURE — 80048 BASIC METABOLIC PNL TOTAL CA: CPT

## 2023-03-30 RX ORDER — POTASSIUM CHLORIDE 750 MG/1
40 TABLET, FILM COATED, EXTENDED RELEASE ORAL
Status: COMPLETED | OUTPATIENT
Start: 2023-03-30 | End: 2023-03-30

## 2023-03-30 RX ORDER — HYDROMORPHONE HYDROCHLORIDE 1 MG/ML
1 INJECTION, SOLUTION INTRAMUSCULAR; INTRAVENOUS; SUBCUTANEOUS
Status: DISCONTINUED | OUTPATIENT
Start: 2023-03-30 | End: 2023-03-31 | Stop reason: HOSPADM

## 2023-03-30 RX ORDER — VELPATASVIR AND SOFOSBUVIR 100; 400 MG/1; MG/1
1 TABLET, FILM COATED ORAL DAILY
COMMUNITY
End: 2023-03-31

## 2023-03-30 RX ORDER — HYDROMORPHONE HYDROCHLORIDE 1 MG/ML
1 INJECTION, SOLUTION INTRAMUSCULAR; INTRAVENOUS; SUBCUTANEOUS
Status: DISCONTINUED | OUTPATIENT
Start: 2023-03-30 | End: 2023-03-30

## 2023-03-30 RX ADMIN — POTASSIUM CHLORIDE 40 MEQ: 750 TABLET, FILM COATED, EXTENDED RELEASE ORAL at 17:23

## 2023-03-30 RX ADMIN — OXYCODONE HYDROCHLORIDE 5 MG: 5 TABLET ORAL at 11:31

## 2023-03-30 RX ADMIN — MORPHINE SULFATE 2 MG: 2 INJECTION, SOLUTION INTRAMUSCULAR; INTRAVENOUS at 04:15

## 2023-03-30 RX ADMIN — OXYCODONE HYDROCHLORIDE 5 MG: 5 TABLET ORAL at 23:50

## 2023-03-30 RX ADMIN — MORPHINE SULFATE 2 MG: 2 INJECTION, SOLUTION INTRAMUSCULAR; INTRAVENOUS at 13:32

## 2023-03-30 RX ADMIN — POTASSIUM CHLORIDE 40 MEQ: 750 TABLET, FILM COATED, EXTENDED RELEASE ORAL at 19:59

## 2023-03-30 RX ADMIN — POTASSIUM CHLORIDE AND SODIUM CHLORIDE: 900; 300 INJECTION, SOLUTION INTRAVENOUS at 20:01

## 2023-03-30 RX ADMIN — MORPHINE SULFATE 2 MG: 2 INJECTION, SOLUTION INTRAMUSCULAR; INTRAVENOUS at 07:58

## 2023-03-30 RX ADMIN — SODIUM CHLORIDE, PRESERVATIVE FREE 10 ML: 5 INJECTION INTRAVENOUS at 17:28

## 2023-03-30 RX ADMIN — HYDROMORPHONE HYDROCHLORIDE 1 MG: 1 INJECTION, SOLUTION INTRAMUSCULAR; INTRAVENOUS; SUBCUTANEOUS at 19:58

## 2023-03-30 RX ADMIN — ALPRAZOLAM 1 MG: 0.5 TABLET ORAL at 22:52

## 2023-03-30 RX ADMIN — SODIUM CHLORIDE, PRESERVATIVE FREE 10 ML: 5 INJECTION INTRAVENOUS at 17:27

## 2023-03-30 NOTE — ED NOTES
Bedside report and dual skin assessment completed with Catherine Whiting. Patient is not wearing restraints during bedside report. Resting quietly.

## 2023-03-30 NOTE — ED NOTES
Bedside and Verbal shift change report given to Gricelda Weber (oncoming nurse) by Rick Adams (offgoing nurse). Report included the following information SBAR, ED Summary, and MAR.

## 2023-03-30 NOTE — PROGRESS NOTES
1355- TRANSFER - IN REPORT:    Verbal report received from Nubia Cantrell [facility] (name) on [employer]  being received from ED (unit) for routine progression of care      Report consisted of patients Situation, Background, Assessment and   Recommendations(SBAR). Information from the following report(s) SBAR, Kardex, Intake/Output, MAR, Cardiac Rhythm Sinus Rhythm, and Quality Measures was reviewed with the receiving nurse. Opportunity for questions and clarification was provided. Assessment completed upon patients arrival to unit and care assumed. 46- Dr. Mary Starkey present at beside, updated on pt, anticipate new orders. 1900- Bedside and Verbal shift change report given to Darling Olivares RN (oncoming nurse) by Eleonora Marshall, BRENDON and Vicki Noe, student RN (offgoing nurse). Report included the following information SBAR, Kardex, Intake/Output, MAR, Cardiac Rhythm Sinus Rhythm, and Quality Measures.

## 2023-03-30 NOTE — PROGRESS NOTES
OT order received, chart reviewed. Patient complaining of 10/10 pain- will follow up later as appropriate for evaluation.     Pedro Moreno, OTR/L

## 2023-03-30 NOTE — CONSULTS
NEUROLOGY CONSULT NOTE    Patient ID:  Kathy Escalante  [id number]  67 y.o.  1950    Date of Consultation:  March 30, 2023    Referring Physician: Radha Garrido MD    Reason for Consultation:  seizure    History of Present Illness:     Patient Active Problem List    Diagnosis Date Noted    Seizure [facility]) 03/29/2023    Hypokalemia 03/29/2023    Leukocytosis 03/29/2023    Rib fracture 03/29/2023    Hyperglycemia 03/29/2023    Fall 03/29/2023    Hypercalcemia 03/29/2023     Past Medical History:   Diagnosis Date    Diverticulitis 2018    Hepatitis C 1992    Hypertension     Pancreatitis 2016      Past Surgical History:   Procedure Laterality Date    COLONOSCOPY N/A 2/23/2018    COLONOSCOPY performed by Piedad Weston MD at [address] [place] [address]      Prior to Admission medications    Medication Sig Start Date End Date Taking? Authorizing Provider   oxyCODONE-acetaminophen (PERCOCET 7.5) 7.5-325 mg per tablet Take 1 Tab by mouth every four (4) hours as needed for Pain. Max Daily Amount: 6 Tabs. 3/11/18   Nany Hayden MD   oxyCODONE-acetaminophen (PERCOCET) 5-325 mg per tablet Take 1-2 Tabs by mouth every four (4) hours as needed for Pain. Max Daily Amount: 12 Tabs. 3/9/18   Sandy Mcclendon MD   ALPRAZolam Counts include [facility] beds at the [facility]) 1 mg tablet Take 1 mg by mouth nightly as needed for Anxiety. Provider, Historical   metoprolol succinate (TOPROL-XL) 50 mg XL tablet Take 50 mg by mouth daily. Takes at 5 pm  Indications: hypertension    Provider, Historical   amLODIPine (NORVASC) 10 mg tablet Take 10 mg by mouth daily. Indications: hypertension    Provider, Historical   cloNIDine HCl (CATAPRES) 0.2 mg tablet Take 0.2 mg by mouth nightly. Indications: hypertension    Provider, Historical   multivitamin with iron (DAILY MULTI-VITAMINS/IRON) tablet Take 1 Tab by mouth daily. Provider, Historical   ascorbic acid, vitamin C, (VITAMIN C) 500 mg tablet Take 500 mg by mouth daily.     Provider, Historical   b complex vitamins tablet Take 1 Tab by mouth daily. Provider, Historical   cyanocobalamin (VITAMIN B12) 500 mcg tablet Take 500 mcg by mouth daily. Provider, Historical   cholecalciferol (VITAMIN D3) 1,000 unit tablet Take 1,000 Units by mouth daily. Provider, Historical   traMADol (ULTRAM) 50 mg tablet Take 1 Tab by mouth every six (6) hours as needed. Max Daily Amount: 200 mg. Indications: Pain 1/22/18   DRU Cochran     No Known Allergies   Social History     Tobacco Use    Smoking status: Never    Smokeless tobacco: Never   Substance Use Topics    Alcohol use: No      Family History   Problem Relation Age of Onset    Cancer Father         Subjective: Stephany Carver is a 67 y.o. male with history of hepatitis C, hypertension, pancreatitis and diverticulitis was admitted from the ER for altered mental status after a fall. Patient apparently bought online from [facility], combination medication of sofosbuvir 400 mg/velpatasvir 100 mg for his hepatitis C and started taking it before this all happened. Patient apparently in the evening when he got up to go to the bathroom his wife heard a loud noise and found him on the floor with a urine everywhere. Patient was not acting himself. EMS was called and patient was found rolling on the floor moaning. Vital signs were normal and blood sugar was 125. Complains of lower back pain. Patient was brought to the ER. In the ER blood pressure was 140/85. While in the ER patient had a witnessed seizure. Followed by postictal confusion, agitation and restlessness. Patient was given 1 mg of Ativan and patient also given Haldol 5 mg IV. Loaded with 1000 mg of levetiracetam.  Laboratory work-up revealed decreased potassium at 2.8, increased blood sugar at 160, increased calcium at 11.3, increased WBC at 18.3, increased PTH at 101.6, increased CK at 626 and urine drug screen positive for THC.   Unremarkable hemoglobin A1c, folate, B12, alcohol screen, ammonia, magnesium, lipase, urinalysis, PT and INR. EKG revealed normal sinus rhythm. CT of the lumbar spine revealed multilevel degenerative disc disease worse at L5-S1. Moderate spinal stenosis L4-L5. Chest CT revealed acute right 10th rib fracture. Head CT without contrast did not reveal any acute process. Head and neck CTA did not reveal any flow-limiting stenosis or aneurysm. Brain MRI is pending. EEG is pending. When seen, patient mainly complains of pain in his right flank. Outside reports reviewed: ER records, radiology reports, lab reports. Review of Systems:    A comprehensive review of systems was done and were negative except for those mentioned in the HPI. Objective:     Patient Vitals for the past 8 hrs:   BP Temp Pulse Resp SpO2 Height Weight   03/30/23 0941 127/64 -- -- -- -- 5' 9\" (1.753 m) 64.4 kg (142 lb)   03/30/23 0907 -- -- 62 18 98 % -- --   03/30/23 0852 -- -- 77 21 95 % -- --   03/30/23 0837 -- -- 71 18 98 % -- --   03/30/23 0822 -- -- 71 18 98 % -- --   03/30/23 0807 -- -- 80 21 97 % -- --   03/30/23 0725 111/62 99.8 °F (37.7 °C) 68 19 97 % -- --   03/30/23 0340 (!) 144/75 100 °F (37.8 °C) 82 18 94 % -- --     PHYSICAL EXAM:    NEUROLOGICAL EXAM:    Appearance: The patient is well developed, well nourished, provides a coherent history and is in no acute distress. Mental Status: Oriented to time, place and person. Fluent, no aphasia or dysarthria. Good recent and remote memory. Good attention and concentration. Able to name objects. Good repetition. Adequate fund of knowledge. Mood and affect appropriate. Cranial Nerves:   Intact visual fields. NICOLAS, EOM's full, no nystagmus, no ptosis. Facial sensation is normal. Corneal reflexes are intact. Facial movement is symmetric. Hearing is normal bilaterally. Palate is midline with normal elevation. Sternocleidomastoid and trapezius muscles are normal. Tongue is midline.    Motor:  5/5 strength in upper and lower proximal and distal muscles. Normal bulk and tone. No fasciculations. No pronator drift. Reflexes:   Deep tendon reflexes 1+/4 and symmetrical. Downgoing toes. Sensory:   Normal to cold and vibration. Gait:  Not tested. Tremor:   No tremor noted. Cerebellar:  Intact FTN/SYLVIE/HTS. Neurovascular:  Normal heart sounds and regular rhythm, peripheral pulses intact, and no carotid bruits. Imaging  CT Head, head/neck CTA: reviewed    Lab Review    Recent Results (from the past 24 hour(s))   EKG, 12 LEAD, INITIAL    Collection Time: 03/29/23  5:36 PM   Result Value Ref Range    Ventricular Rate 86 BPM    Atrial Rate 86 BPM    P-R Interval 174 ms    QRS Duration 90 ms    Q-T Interval 394 ms    QTC Calculation (Bezet) 471 ms    Calculated P Axis 56 degrees    Calculated R Axis 50 degrees    Calculated T Axis 36 degrees    Diagnosis       Normal sinus rhythm with sinus arrhythmia  Nonspecific ST and T wave abnormality  Abnormal ECG  When compared with ECG of 05-MAR-2018 11:59,  Vent. rate has increased BY  30 BPM  Nonspecific T wave abnormality now evident in Inferior leads  QT has lengthened  Confirmed by Brayden Manning MD, MARYANNE ([id number]) on 3/30/2023 9:17:59 AM     CBC WITH AUTOMATED DIFF    Collection Time: 03/29/23  5:43 PM   Result Value Ref Range    WBC 18.3 (H) 4.1 - 11.1 K/uL    RBC 4.88 4.10 - 5.70 M/uL    HGB 15.5 12.1 - 17.0 g/dL    HCT 43.9 36.6 - 50.3 %    MCV 90.0 80.0 - 99.0 FL    MCH 31.8 26.0 - 34.0 PG    MCHC 35.3 30.0 - 36.5 g/dL    RDW 12.2 11.5 - 14.5 %    PLATELET 680 255 - 959 K/uL    MPV 11.4 8.9 - 12.9 FL    NRBC 0.0 0  WBC    ABSOLUTE NRBC 0.00 0.00 - 0.01 K/uL    NEUTROPHILS 86 (H) 32 - 75 %    LYMPHOCYTES 8 (L) 12 - 49 %    MONOCYTES 6 5 - 13 %    EOSINOPHILS 0 0 - 7 %    BASOPHILS 0 0 - 1 %    IMMATURE GRANULOCYTES 0 0.0 - 0.5 %    ABS. NEUTROPHILS 15.6 (H) 1.8 - 8.0 K/UL    ABS. LYMPHOCYTES 1.5 0.8 - 3.5 K/UL    ABS. MONOCYTES 1.0 0.0 - 1.0 K/UL    ABS.  EOSINOPHILS 0.0 0.0 - 0.4 K/UL ABS. BASOPHILS 0.0 0.0 - 0.1 K/UL    ABS. IMM. GRANS. 0.1 (H) 0.00 - 0.04 K/UL    DF AUTOMATED     PROTHROMBIN TIME + INR    Collection Time: 03/29/23  5:43 PM   Result Value Ref Range    INR 1.0 0.9 - 1.1      Prothrombin time 10.6 9.0 - 89.3 sec   METABOLIC PANEL, COMPREHENSIVE    Collection Time: 03/29/23  5:43 PM   Result Value Ref Range    Sodium 139 136 - 145 mmol/L    Potassium 2.8 (L) 3.5 - 5.1 mmol/L    Chloride 105 97 - 108 mmol/L    CO2 22 21 - 32 mmol/L    Anion gap 12 5 - 15 mmol/L    Glucose 160 (H) 65 - 100 mg/dL    BUN 9 6 - 20 MG/DL    Creatinine 1.19 0.70 - 1.30 MG/DL    BUN/Creatinine ratio 8 (L) 12 - 20      eGFR >60 >60 ml/min/1.73m2    Calcium 11.3 (H) 8.5 - 10.1 MG/DL    Bilirubin, total 0.6 0.2 - 1.0 MG/DL    ALT (SGPT) 22 12 - 78 U/L    AST (SGOT) 17 15 - 37 U/L    Alk. phosphatase 60 45 - 117 U/L    Protein, total 7.8 6.4 - 8.2 g/dL    Albumin 4.1 3.5 - 5.0 g/dL    Globulin 3.7 2.0 - 4.0 g/dL    A-G Ratio 1.1 1.1 - 2.2     LIPASE    Collection Time: 03/29/23  5:43 PM   Result Value Ref Range    Lipase 136 73 - 393 U/L   MAGNESIUM    Collection Time: 03/29/23  5:43 PM   Result Value Ref Range    Magnesium 2.4 1.6 - 2.4 mg/dL   AMMONIA    Collection Time: 03/29/23  5:43 PM   Result Value Ref Range    Ammonia, plasma 12 <32 UMOL/L   ETHYL ALCOHOL    Collection Time: 03/29/23  5:43 PM   Result Value Ref Range    ALCOHOL(ETHYL),SERUM <10 <10 MG/DL   SAMPLES BEING HELD    Collection Time: 03/29/23  5:43 PM   Result Value Ref Range    SAMPLES BEING HELD 1SST,PBC     COMMENT        Add-on orders for these samples will be processed based on acceptable specimen integrity and analyte stability, which may vary by analyte.    VITAMIN B12    Collection Time: 03/29/23  5:43 PM   Result Value Ref Range    Vitamin B12 718 193 - 986 pg/mL   FOLATE    Collection Time: 03/29/23  5:43 PM   Result Value Ref Range    Folate 51.6 (H) 5.0 - 21.0 ng/mL   HEMOGLOBIN A1C WITH EAG    Collection Time: 03/29/23  5:43 PM Result Value Ref Range    Hemoglobin A1c 5.3 4.0 - 5.6 %    Est. average glucose 105 mg/dL   GLUCOSE, POC    Collection Time: 03/29/23  7:49 PM   Result Value Ref Range    Glucose (POC) 159 (H) 65 - 117 mg/dL    Performed by Otto Francis W/MICROSCOPIC    Collection Time: 03/29/23  8:06 PM   Result Value Ref Range    Color YELLOW/STRAW      Appearance CLEAR CLEAR      Specific gravity 1.013 1.003 - 1.030      pH (UA) 6.5 5.0 - 8.0      Protein Negative NEG mg/dL    Glucose Negative NEG mg/dL    Ketone 40 (A) NEG mg/dL    Bilirubin Negative NEG      Blood Negative NEG      Urobilinogen 0.2 0.2 - 1.0 EU/dL    Nitrites Negative NEG      Leukocyte Esterase TRACE (A) NEG      WBC 0-4 0 - 4 /hpf    RBC 0-5 0 - 5 /hpf    Epithelial cells FEW FEW /lpf    Bacteria Negative NEG /hpf    Hyaline cast 0-2 0 - 2 /lpf   DRUG SCREEN, URINE    Collection Time: 03/29/23  8:06 PM   Result Value Ref Range    AMPHETAMINES Negative NEG      BARBITURATES Negative NEG      BENZODIAZEPINES Negative NEG      COCAINE Negative NEG      METHADONE Negative NEG      OPIATES Negative NEG      PCP(PHENCYCLIDINE) Negative NEG      THC (TH-CANNABINOL) Positive (A) NEG      Drug screen comment (NOTE)    PROCALCITONIN    Collection Time: 03/29/23  8:37 PM   Result Value Ref Range    Procalcitonin <0.05 ng/mL   CK    Collection Time: 03/29/23  8:37 PM   Result Value Ref Range     (H) 39 - 308 U/L   PTH INTACT    Collection Time: 03/29/23  8:37 PM   Result Value Ref Range    Calcium 10.2 (H) 8.5 - 10.1 MG/DL    PTH, Intact 101.6 (H) 18.4 - 88.0 pg/mL   GLUCOSE, POC    Collection Time: 03/29/23 11:42 PM   Result Value Ref Range    Glucose (POC) 132 (H) 65 - 117 mg/dL    Performed by Kaden Shepard    CBC WITH AUTOMATED DIFF    Collection Time: 03/30/23 12:58 AM   Result Value Ref Range    WBC 17.5 (H) 4.1 - 11.1 K/uL    RBC 4.50 4. 10 - 5.70 M/uL    HGB 14.0 12.1 - 17.0 g/dL    HCT 40.3 36.6 - 50.3 %    MCV 89.6 80.0 - 99.0 FL    MCH 31.1 26.0 - 34.0 PG    MCHC 34.7 30.0 - 36.5 g/dL    RDW 12.0 11.5 - 14.5 %    PLATELET 320 676 - 380 K/uL    MPV 11.6 8.9 - 12.9 FL    NRBC 0.0 0  WBC    ABSOLUTE NRBC 0.00 0.00 - 0.01 K/uL    NEUTROPHILS 88 (H) 32 - 75 %    LYMPHOCYTES 4 (L) 12 - 49 %    MONOCYTES 8 5 - 13 %    EOSINOPHILS 0 0 - 7 %    BASOPHILS 0 0 - 1 %    IMMATURE GRANULOCYTES 0 0.0 - 0.5 %    ABS. NEUTROPHILS 15.4 (H) 1.8 - 8.0 K/UL    ABS. LYMPHOCYTES 0.7 (L) 0.8 - 3.5 K/UL    ABS. MONOCYTES 1.4 (H) 0.0 - 1.0 K/UL    ABS. EOSINOPHILS 0.0 0.0 - 0.4 K/UL    ABS. BASOPHILS 0.0 0.0 - 0.1 K/UL    ABS. IMM. GRANS. 0.0 0.00 - 0.04 K/UL    DF SMEAR SCANNED      RBC COMMENTS NORMOCYTIC, NORMOCHROMIC     METABOLIC PANEL, BASIC    Collection Time: 03/30/23 12:58 AM   Result Value Ref Range    Sodium 141 136 - 145 mmol/L    Potassium 3.0 (L) 3.5 - 5.1 mmol/L    Chloride 108 97 - 108 mmol/L    CO2 27 21 - 32 mmol/L    Anion gap 6 5 - 15 mmol/L    Glucose 147 (H) 65 - 100 mg/dL    BUN 7 6 - 20 MG/DL    Creatinine 0.85 0.70 - 1.30 MG/DL    BUN/Creatinine ratio 8 (L) 12 - 20      eGFR >60 >60 ml/min/1.73m2    Calcium 9.7 8.5 - 10.1 MG/DL   MAGNESIUM    Collection Time: 03/30/23 12:58 AM   Result Value Ref Range    Magnesium 2.0 1.6 - 2.4 mg/dL         Assessment:   Seizure  Concussion  Elevated PTH    Plan:   Neurological examination is nonfocal.  Event seems to be more consistent with a fall causing a concussion followed by seizure. Need to assess for risk for primary epilepsy or future seizures. Need to also consider potential adverse effect of combination medication from [facility] that he took for his hepatitis C. Head CT without contrast did not reveal any acute process. Brain MRI is pending to look for subtle stroke. Head and neck CTA did not reveal any flow-limiting stenosis or aneurysm. No ICA stenosis. EEG was ordered and is pending. Limit use of sedating medication.     Patient has elevated PTH with consequent elevation of calcium. Continue assessment as team is doing. Further intervention be done pending results of testing. No indication currently to put patient on maintenance AED regimen. Continue seizure precaution. Thank you for the consult. This note was created using voice recognition software. Despite editing, there may be syntax errors.

## 2023-03-30 NOTE — ED NOTES
Patient too agitated/ restless for MRI to be completed at this time, provider made aware.      Plan to complete MRI tomorrow

## 2023-03-30 NOTE — PROGRESS NOTES
Problem: Self Care Deficits Care Plan (Adult)  Goal: *Therapy Goal (Edit Goal, Insert Text)  Description: FUNCTIONAL STATUS PRIOR TO ADMISSION: Patient was independent and active without use of DME. Lives with spouse in split foyer home with 7 steps to upstaits and to downstairs levels. Occupational Therapy  Initiated 3/30/23  1. Patient will perform grooming in stand with modified independence within 7 day(s). 2.  Patient will perform bathing with modified independence within 7 day(s). 3.  Patient will perform lower body dressing with modified independence within 7 day(s). 4.  Patient will perform toilet transfers with modified independence within 7 day(s). 5.  Patient will perform all aspects of toileting with modified independence within 7 day(s). Outcome: Not Met   OCCUPATIONAL THERAPY EVALUATION  Patient: Kathy Escalante (22 y.o. male)  Date: 3/30/2023  Primary Diagnosis: Seizure ([facility] [address] [R56.9]       Precautions: fall       ASSESSMENT  Based on the objective data described below, the patient presents with decreased standing tolerance and balance, R side rib pain, R foot pain impacting ADL/mobility. Patient had fall at home that spouse reports may have been due to new medication. Patient is typically very active, independent, and quite health conscious. He has known R 10th rib fracture. There is a bruising and swelling noted on R foot, 3rd toe- patient reports pain in standing. Nurse made aware, no xray noted in chart. Standing balance/tolerance likely impaired due to medications. Pain fairly well controlled for today's session as patient has dose of morphine. Will follow patient while in this facility to facilitate return to independence ADL. Likely no follow OT services needed at discharge. Current Level of Function Impacting Discharge (ADLs/self-care): min assist/CGA in stand.  May change depending on pain mgmt    Functional Outcome Measure:  [facility] AM-PAC®      Daily Activity Inpatient Short Form (6-Clicks) Version 2  How much HELP from another person do you currently need. .. (If the patient hasn't done an activity recently, how much help from another person do you think they would need if they tried?) Total A Lot A Little None   1. Putting on and taking off regular lower body clothing? []   1 []   2 [x]   3 []   4   2. Bathing (including washing, rinsing, drying)? []   1 []   2 [x]   3 []   4   3. Toileting, which includes using toilet, bedpan, or urinal? []   1 []   2 [x]   3 []   4   4. Putting on and taking off regular upper body clothing? []   1 []   2 []   3 [x]   4   5. Taking care of personal grooming such as brushing teeth? []   1 []   2 []   3 [x]   4   6. Eating meals? []   1 []   2 []   3 []   4     Raw Score: 21/24                            Cutoff score ?191,2,3 had higher odds of discharging home with home health or need of SNF/IPR    1. Gill Dunaway. Validity of the AM-PAC 6-Clicks Inpatient Daily Activity and Basic Mobility Short Forms. Physical Therapy Mar 2014, 94 (3) 379-391; DOI: 10.2522/ptj.[id number]  2. Angelina Shetty. Association of AM-PAC \"6-Clicks\" Basic Mobility and Daily Activity Scores With Discharge Destination. Phys Ther. 2021 Apr 4;101(4):odpe689. doi: 10.1093/ptj/gdvo373. PMID: 35230700. V Mir Recinos, Opal D, Caleb Edwards, Buck K, Monico S. Activity Measure for Post-Acute Care \"6-Clicks\" Basic Mobility Scores Predict Discharge Destination After Acute Care Hospitalization in Select Patient Groups: A Retrospective, Observational Study. Arch Rehabil Res Clin Transl. 2022 Jul 16;4(3):815103. doi: 10.1016/j.arrct. 1742.221250. PMID: 28124175; PMCID: [id number]. 4. Torito Carrillo, Ni P. AM-PAC Short Forms Manual 4.0. Revised 2/2020.        Patient will benefit from skilled therapy intervention to address the above noted impairments. PLAN :  Recommendations and Planned Interventions: self care training, functional mobility training, therapeutic exercise, balance training, therapeutic activities, endurance activities, patient education, home safety training, and family training/education    Frequency/Duration: Patient will be followed by occupational therapy 3 times a week to address goals. Recommendation for discharge: (in order for the patient to meet his/her long term goals)  No skilled occupational therapy/ follow up rehabilitation needs identified at this time. This discharge recommendation:  Has not yet been discussed the attending provider and/or case management    IF patient discharges home will need the following DME: TBD       SUBJECTIVE:   Patient pleasant and cooperative     OBJECTIVE DATA SUMMARY:   HISTORY:   Past Medical History:   Diagnosis Date    Diverticulitis 2018    Hepatitis C 1992    Hypertension     Pancreatitis 2016     Past Surgical History:   Procedure Laterality Date    COLONOSCOPY N/A 2/23/2018    COLONOSCOPY performed by Piedad Weston MD at [address] [place] [address]       Expanded or extensive additional review of patient history:     Home Situation  Home Environment: Private residence  # Steps to Enter: 4  One/Two Story Residence: Split level (split foyer, no \"main\" level)  # of Interior Steps: 7 (7 TO UPSTAIRS, OR 7 TO DOWNSTAIRS)  Interior Rails: Right  Living Alone: No  Support Systems: Spouse/Significant Other  Patient Expects to be Discharged to[de-identified] Other:  Current DME Used/Available at Home: None        EXAMINATION OF PERFORMANCE DEFICITS:  Cognitive/Behavioral Status:  Neurologic State: Alert  Orientation Level: Oriented X4  Cognition: Appropriate decision making; Follows commands           Hearing:   Auditory  Auditory Impairment: None    Vision/Perceptual:                                     Range of Motion:    AROM: Within functional limits Strength:    Strength: Within functional limits                Coordination:  Coordination: Within functional limits  Fine Motor Skills-Upper: Left Intact; Right Intact    Gross Motor Skills-Upper: Left Intact; Right Intact    Tone & Sensation:    Tone: Normal  Sensation: Intact                      Balance:  Sitting: Intact; Without support  Standing: Impaired  Standing - Static: Good; Unsupported  Standing - Dynamic : Fair;Unsupported; Constant support    Functional Mobility and Transfers for ADLs:  Bed Mobility:  Supine to Sit: Supervision  Sit to Supine: Supervision    Transfers:  Sit to Stand: Contact guard assistance  Stand to Sit: Supervision  Bed to Chair: Contact guard assistance    ADL Assessment:  Feeding: Independent    Oral Facial Hygiene/Grooming: Setup (CGA to close Sup in stand)    Bathing: Contact guard assistance    Type of Bath: Chlorhexidine (CHG); Bath Pack; Full    Upper Body Dressing: Setup    Lower Body Dressing: Contact guard assistance    Toileting: Contact guard assistance                ADL Intervention and task modifications:                   Occupational Therapy Evaluation Charge Determination   History Examination Decision-Making   LOW Complexity : Brief history review  LOW Complexity : 1-3 performance deficits relating to physical, cognitive , or psychosocial skils that result in activity limitations and / or participation restrictions  LOW Complexity : No comorbidities that affect functional and no verbal or physical assistance needed to complete eval tasks       Based on the above components, the patient evaluation is determined to be of the following complexity level: LOW   Pain Rating:  Back, R side rib, R foot pain reported.  Not quantified, patient medicated prior to session    Activity Tolerance:   requires rest breaks    After treatment patient left in no apparent distress:    Supine in bed, Call bell within reach, Caregiver / family present, and Side rails x 3    COMMUNICATION/EDUCATION:   The patients plan of care was discussed with: Registered nurse. Home safety education was provided and the patient/caregiver indicated understanding. and Patient/family have participated as able in goal setting and plan of care. This patients plan of care is appropriate for delegation to [facility].     Thank you for this referral.  Pedro Moreno OT  Time Calculation: 24 mins

## 2023-03-30 NOTE — PROGRESS NOTES
[facility]  [address] [facility], [facility] [address]  [phone]         Hospitalist Progress Note        NAME:  Diana Sanchez   :  1950   MRN:  [id number]    Date/Time:  3/30/2023     Patient PCP:  Guillermina Khalil MD    Emergency Contact:    Extended Emergency Contact Information  Primary Emergency Contact: [address] Phone: [phone]  Relation: Spouse      Code: Full Code     Isolation Precautions: There are currently no Active Isolations        Subjective:     REASON FOR VISIT:  Recheck AMS/SZ     HPI & INTERVAL HISTORY:     Mr. Blanco Saunders is a 67 y.o.  male with PMH of hep C, HTN admitted s/p fall for AMS and then subsequently had a new onset seizure in the ED requiring administration of ativan subsequently leading to worsening confusion, restlessness. Per wife, heard a \"thump\" in the bathroom and noted that her  was on the floor and hit his head. Urine was everywhere. No prior h/o seizures. Denies EtOH abuse. Per wife, recently started a non-prescribed medication for his hep C; she is uncertain what it is called. He bought it online. 3/30: Patient was seen and examined. He seems to be alert oriented. No further episodes of seizures. Wife was at bedside. He complained of right foot pain where there is bruising likely from the fall at home.       ALLERGIES  No Known Allergies           Objective:      Visit Vitals  BP (!) 144/75 (BP 1 Location: Left upper arm, BP Patient Position: At rest)   Pulse 82   Temp 100 °F (37.8 °C)   Resp 18   Ht 5' 9\" (1.753 m)   Wt 64.4 kg (142 lb)   SpO2 94%   BMI 20.97 kg/m²       General: alert, well appearing, and no distress  Head: Normocephalic, without obvious abnormality, left supraorbital abrasion and bruising  Eyes: PERRL, EOMI, anicteric sclerae, and conjuntiva clear  ENT: lips, mucosa, and tongue normal  Neck: normal, supple, and no tenderness  Lungs: clear to auscultation with good breath sounds and normal respiratory effort  Heart: S1, S2 normal, regular rate, and regular rhythm  Abd: not distended, soft, nontender, BS present and normactive  Ext: no cyanosis and no edema  Skin:  Bruising right foot dorsal aspect  Neuro:  alert, oriented x 3, no defects noted in general exam.  Psych: not anxious, cooperative, appropriate affect      Medications:  Current Facility-Administered Medications   Medication Dose Route Frequency    [Held by provider] acetaminophen (TYLENOL) tablet 650 mg  650 mg Oral Q6H    0.9% sodium chloride with KCl 40 mEq/L infusion   IntraVENous CONTINUOUS    sodium chloride (NS) flush 5-40 mL  5-40 mL IntraVENous Q8H    sodium chloride (NS) flush 5-40 mL  5-40 mL IntraVENous PRN    naloxone (NARCAN) injection 0.4 mg  0.4 mg IntraVENous PRN    morphine injection 2 mg  2 mg IntraVENous Q4H PRN    ondansetron (ZOFRAN) injection 4 mg  4 mg IntraVENous Q4H PRN    insulin lispro (HUMALOG) injection   SubCUTAneous AC&HS    glucose chewable tablet 16 g  4 Tablet Oral PRN    glucagon (GLUCAGEN) injection 1 mg  1 mg IntraMUSCular PRN    dextrose 10% infusion 0-250 mL  0-250 mL IntraVENous PRN    oxyCODONE IR (ROXICODONE) tablet 5 mg  5 mg Oral Q4H PRN    ALPRAZolam (XANAX) tablet 1 mg  1 mg Oral QHS PRN    [Held by provider] amLODIPine (NORVASC) tablet 10 mg  10 mg Oral DAILY    [Held by provider] ascorbic acid (vitamin C) (VITAMIN C) tablet 500 mg  500 mg Oral DAILY    [Held by provider] cloNIDine HCL (CATAPRES) tablet 0.2 mg  0.2 mg Oral QHS    [Held by provider] metoprolol succinate (TOPROL-XL) XL tablet 50 mg  50 mg Oral QPM    sodium chloride (NS) flush 5-40 mL  5-40 mL IntraVENous Q8H    sodium chloride (NS) flush 5-40 mL  5-40 mL IntraVENous PRN    acetaminophen (TYLENOL) tablet 650 mg  650 mg Oral Q6H PRN    Or    acetaminophen (TYLENOL) suppository 650 mg  650 mg Rectal Q6H PRN    polyethylene glycol (MIRALAX) packet 17 g  17 g Oral DAILY PRN ondansetron (ZOFRAN ODT) tablet 4 mg  4 mg Oral Q8H PRN    Or    ondansetron (ZOFRAN) injection 4 mg  4 mg IntraVENous Q6H PRN    LORazepam (ATIVAN) injection 1 mg  1 mg IntraVENous Q4H PRN    haloperidol lactate (HALDOL) injection 5 mg  5 mg IntraMUSCular Q6H PRN    OLANZapine (ZyPREXA) 5 mg in sterile water (preservative free) 1 mL injection  5 mg IntraMUSCular ONCE    hydrALAZINE (APRESOLINE) 20 mg/mL injection 10 mg  10 mg IntraVENous Q6H PRN     Current Outpatient Medications   Medication Sig    oxyCODONE-acetaminophen (PERCOCET 7.5) 7.5-325 mg per tablet Take 1 Tab by mouth every four (4) hours as needed for Pain. Max Daily Amount: 6 Tabs. oxyCODONE-acetaminophen (PERCOCET) 5-325 mg per tablet Take 1-2 Tabs by mouth every four (4) hours as needed for Pain. Max Daily Amount: 12 Tabs. ALPRAZolam (XANAX) 1 mg tablet Take 1 mg by mouth nightly as needed for Anxiety. metoprolol succinate (TOPROL-XL) 50 mg XL tablet Take 50 mg by mouth daily. Takes at 5 pm  Indications: hypertension    amLODIPine (NORVASC) 10 mg tablet Take 10 mg by mouth daily. Indications: hypertension    cloNIDine HCl (CATAPRES) 0.2 mg tablet Take 0.2 mg by mouth nightly. Indications: hypertension    multivitamin with iron (DAILY MULTI-VITAMINS/IRON) tablet Take 1 Tab by mouth daily. ascorbic acid, vitamin C, (VITAMIN C) 500 mg tablet Take 500 mg by mouth daily. b complex vitamins tablet Take 1 Tab by mouth daily. cyanocobalamin (VITAMIN B12) 500 mcg tablet Take 500 mcg by mouth daily. cholecalciferol (VITAMIN D3) 1,000 unit tablet Take 1,000 Units by mouth daily. traMADol (ULTRAM) 50 mg tablet Take 1 Tab by mouth every six (6) hours as needed. Max Daily Amount: 200 mg.  Indications: Pain        Labs:  Recent Labs     03/30/23 0058   WBC 17.5*   HGB 14.0   HCT 40.3        Recent Labs     03/30/23 0058 03/29/23 2037 03/29/23  1743     --  139   K 3.0*  --  2.8*     --  105   CO2 27  --  22   * --  160*   BUN 7  --  9   CREA 0.85  --  1.19   CA 9.7   < > 11.3*   MG 2.0  --  2.4   ALB  --   --  4.1   TBILI  --   --  0.6   ALT  --   --  22    < > = values in this interval not displayed. Radiology:  CT HEAD WO CONT    Result Date: 3/29/2023  No acute intracranial abnormality. CT CHEST WO CONT    Result Date: 3/29/2023  1. Acute right 10th rib fracture. 2. Clear lungs. No pneumothorax. CT SPINE LUMB WO CONT    Result Date: 3/29/2023  1. No acute osseous abnormality. 2. Multilevel degenerative spondylosis, most advanced at L5-S1. CTA HEAD NECK W CONT    Result Date: 3/29/2023  Examination is limited by patient motion artifact. No evidence for acute large vessel arterial occlusion. I personally reviewed and interpreted the imaging studies and agree with official reading    The External notes reviewed, ER course, labs, imaging studies, and medications was reviewed by me on: March 30, 2023         Assessment/Plan:      Seizure [facility]) (3/29/2023)   unusual that pt is having a seizure for the first time in his life. ?withdrawal from medication ?substance such as this new medication that is unclear what is is and not prescribed  head CT as above   check MRI brain   EEG   neurology eval   check TSH, B12/folate, ammonia     Fall (3/29/2023)/syncope - ?unclear what transpired in the bathroom   Possibly seizure versus micturition syncope  Telemetry monitoring  Echo pending    Met encephalopathy - suspect 2/2 #1, post-ictal state, ativan   Likely postictal  Improved       Hypokalemia (3/29/2023)  replete and monitor      Leukocytosis (3/29/2023) - ?etiology.  ?reactive to seizure  Undetectable procalc  chest CT, UA not c/w infectious process       Rib fracture (3/29/2023)   supportive care        Hyperglycemia (3/29/2023)  Likely reactive  A1c 5.3  Glucose checks with insulin sliding scale coverage    Hypercalcemia (3/29/2023)  check PTH  IVF's and monitor labs     HTN   resume home PO meds when able to safely swallow PO  PRN hydralazine       Body mass index is 20.97 kg/m².:  18.5 - 24.9:  Normal weight    F: NS KCl 20 meq 75 ml/hr  E: Hyperkalemia and Will replace and monitor  N: DIET NPO       Risk of deterioration: Moderate     Discussed:  Pt's condition, Imaging findings, Lab findings, Assessment, and Care Plan discussed with: Patient, Spouse at bedside , RN, and Care Manager    Prophylaxis:  SCD's    Anticipated discharge disposition:  Home with family        Total time: -28- minutes **I personally saw and examined the patient during this time period**                   ___________________________________________________    Signed:    Patricia Clifford MD

## 2023-03-30 NOTE — ED NOTES
Patient very restless, pulled out bilateral IV's. Urinated on self. New 20G IV inserted in LAC and 20G in Right Hand. Patient gown and linens changed. Seizure pads applied to stretcher. 2015: Patient pulled out IV. New 20G IV inserted into right forearm.

## 2023-03-30 NOTE — H&P
[facility]  [address] [facility]  [phone]    Admission History and Physical      NAME:  Verito Oswald   :   1950   MRN:  [id number]     PCP:  Humberto Alvarado MD     Date/Time:  3/29/2023         Subjective:     CHIEF COMPLAINT: \"I don't know\"     HISTORY OF PRESENT ILLNESS:     Mr. French Rose is a 67 y.o.  male with PMH of hep C, HTN admitted s/p fall for AMS and then subsequently had a new onset seizure in the ED requiring administration of ativan subsequently leading to worsening confusion, restlessness. Per wife, heard a \"thump\" in the bathroom and noted that her  was on the floor and hit his head. Urine was everywhere. No prior h/o seizures. Denies EtOH abuse. Per wife, recently started a non-prescribed medication for his hep C; she is uncertain what it is called. He bought it online. Past Medical History:   Diagnosis Date    Diverticulitis     Hepatitis C 1992    Hypertension     Pancreatitis 2016        Past Surgical History:   Procedure Laterality Date    COLONOSCOPY N/A 2018    COLONOSCOPY performed by Lexi Grace MD at [address] [place] [address]       Social History     Tobacco Use    Smoking status: Never    Smokeless tobacco: Never   Substance Use Topics    Alcohol use: No        Family History   Problem Relation Age of Onset    Cancer Father         No Known Allergies     Prior to Admission medications    Medication Sig Start Date End Date Taking? Authorizing Provider   oxyCODONE-acetaminophen (PERCOCET 7.5) 7.5-325 mg per tablet Take 1 Tab by mouth every four (4) hours as needed for Pain. Max Daily Amount: 6 Tabs. 3/11/18   Jacob Llanes MD   oxyCODONE-acetaminophen (PERCOCET) 5-325 mg per tablet Take 1-2 Tabs by mouth every four (4) hours as needed for Pain. Max Daily Amount: 12 Tabs.  3/9/18   Maria Luisa Hadley MD   ALPRAZolam [place] Cam) 1 mg tablet Take 1 mg by mouth nightly as needed for Anxiety. Provider, Historical   metoprolol succinate (TOPROL-XL) 50 mg XL tablet Take 50 mg by mouth daily. Takes at 5 pm  Indications: hypertension    Provider, Historical   amLODIPine (NORVASC) 10 mg tablet Take 10 mg by mouth daily. Indications: hypertension    Provider, Historical   cloNIDine HCl (CATAPRES) 0.2 mg tablet Take 0.2 mg by mouth nightly. Indications: hypertension    Provider, Historical   multivitamin with iron (DAILY MULTI-VITAMINS/IRON) tablet Take 1 Tab by mouth daily. Provider, Historical   ascorbic acid, vitamin C, (VITAMIN C) 500 mg tablet Take 500 mg by mouth daily. Provider, Historical   b complex vitamins tablet Take 1 Tab by mouth daily. Provider, Historical   cyanocobalamin (VITAMIN B12) 500 mcg tablet Take 500 mcg by mouth daily. Provider, Historical   cholecalciferol (VITAMIN D3) 1,000 unit tablet Take 1,000 Units by mouth daily. Provider, Historical   traMADol (ULTRAM) 50 mg tablet Take 1 Tab by mouth every six (6) hours as needed. Max Daily Amount: 200 mg. Indications: Pain 1/22/18   DRU Longoria         Review of Systems:  Pt altered        Objective:      VITALS:    Vital signs reviewed; most recent are:    Visit Vitals  /78   Pulse 96   Temp 98.3 °F (36.8 °C)   Resp 23   Ht 5' 9\" (1.753 m)   Wt 64.4 kg (142 lb)   SpO2 95%   BMI 20.97 kg/m²     SpO2 Readings from Last 6 Encounters:   03/29/23 95%   03/11/18 96%   03/05/18 97%   02/23/18 100%   01/22/18 96%   11/18/15 96%        No intake or output data in the 24 hours ending 03/29/23 1772         Exam:     Physical Exam:    Gen: Confused. Pupils dilated but reactive to light. Moving all extremities vigorously.  L sided forehead contusion   HEENT:  Pink conjunctivae, PERRL, hearing intact to voice, moist mucous membranes  Neck:  Supple, without masses, thyroid non-tender  Resp:  No accessory muscle use, clear breath sounds without wheezes rales or rhonchi  Card:  No murmurs, normal S1, S2 without thrills, bruits or peripheral edema  Abd:  Soft, non-tender, non-distended, normoactive bowel sounds are present, no palpable organomegaly  Lymph:  No cervical adenopathy  Musc:  No cyanosis or clubbing  Skin:  No rashes or ulcers, skin turgor is good  Neuro: See above   Psych: No insight; confused      Labs:    Recent Labs     03/29/23 1743   WBC 18.3*   HGB 15.5   HCT 43.9        Recent Labs     03/29/23 1743      K 2.8*      CO2 22   *   BUN 9   CREA 1.19   CA 11.3*   MG 2.4   ALB 4.1   ALT 22     No components found for: GLPOC  No results for input(s): PH, PCO2, PO2, HCO3, FIO2 in the last 72 hours. Recent Labs     03/29/23 1743   INR 1.0     Head CT => no acute process  CTA head/neck => no LVO   Chest CT => 10th rib fracture. No PNA        Assessment/Plan:     Seizure ([address] (3/29/2023) - unusual that pt is having a seizure for the first time in his life. ?withdrawal from medication ?substance such as this new medication that is unclear what is is and not prescribed  -head CT as above   -check MRI brain   -EEG   -neurology eval   -check TSH, B12/folate, ammonia    Met encephalopathy - suspect 2/2 #1, post-ictal state, ativan   -see above      Hypokalemia (3/29/2023)  -replete       Leukocytosis (3/29/2023) - ?etiology.  ?reactive to seizure  -check procalc  -chest CT, UA not c/w infectious process       Rib fracture (3/29/2023)   -supportive care       Hyperglycemia (3/29/2023)  -check A1c  -ISS   -BG checks AC TID and qHS       Fall (3/29/2023)/syncope - ?unclear what transpired in the bathroom   -check TTE  -MRI as above  -monitor on tele       Hypercalcemia (3/29/2023)  -check PTH  -IVF's     HTN   -resume home PO meds when able to safely swallow PO  -PRN hydralazine     Surrogate decision maker: Wife     Total time spent with patient: [address] discussed with: Patient and Family    Discussed:  Care Plan    Prophylaxis:  SCD's     Probable Disposition: Home w/Family           ___________________________________________________    Attending Physician: Bon Fiore MD

## 2023-03-30 NOTE — ED NOTES
Patient with continued restlessness and agitation. Patient removed right forearm IV and is attempting to climb out of bed. Dr. Grecia Astudillo made aware.

## 2023-03-30 NOTE — ED NOTES
Patient returned to room from CT, requested something to drink. Upon returning to room RN noted to be having a seizure. Dr. Isael Villafuerte present at bedside. Patient became hypoxic, 2L O2 applied and  O2 returned to baseline 96%. 1mg IV ativan givem. Patient post ictal with no command following at this time.

## 2023-03-30 NOTE — PROCEDURES
[facility] [address]     Electroencephalogram Report    Procedure ID: SFA  Procedure Date: 03/30/2023   Patient Name: Malissa Manzo YOB: 1950   Procedure Type: Routine Medical Record No: [id number]     INDICATION: Seizure    STATE:  Awake and sleep . DESCRIPTION OF PROCEDURE: Electrodes were applied in accordance with the international 10-20 system of electrode placement. EEG was reviewed in both bipolar and referential montages. Description of Activity:  During wakefulness, there is continuous runs of 9-10 Hz symmetric posterior alpha rhythm, that attenuate symmetrically with eye opening. Low voltage beta activity occurs symmetrically at the anterior head regions bilaterally. During drowsiness, there is attenuation of the alpha rhythm and low voltage theta activity occurs bilaterally. Vertex sharp waves and sleep spindles occur and are symmetric. No sharp or spike discharges, seizures or epileptiform discharges seen. No focal asymmetry. Clinical Interpretation: This EEG, performed during wakefulness and sleep is normal. There is no focal asymmetry, seizures or epileptiform discharges seen.        Medications:  Current Facility-Administered Medications   Medication Dose Route Frequency    [Held by provider] acetaminophen (TYLENOL) tablet 650 mg  650 mg Oral Q6H    0.9% sodium chloride with KCl 40 mEq/L infusion   IntraVENous CONTINUOUS    sodium chloride (NS) flush 5-40 mL  5-40 mL IntraVENous Q8H    sodium chloride (NS) flush 5-40 mL  5-40 mL IntraVENous PRN    naloxone (NARCAN) injection 0.4 mg  0.4 mg IntraVENous PRN    morphine injection 2 mg  2 mg IntraVENous Q4H PRN    ondansetron (ZOFRAN) injection 4 mg  4 mg IntraVENous Q4H PRN    insulin lispro (HUMALOG) injection   SubCUTAneous AC&HS    glucose chewable tablet 16 g  4 Tablet Oral PRN    glucagon (GLUCAGEN) injection 1 mg  1 mg IntraMUSCular PRN    dextrose 10% infusion 0-250 mL  0-250 mL IntraVENous PRN    oxyCODONE IR (ROXICODONE) tablet 5 mg  5 mg Oral Q4H PRN    ALPRAZolam (XANAX) tablet 1 mg  1 mg Oral QHS PRN    [Held by provider] amLODIPine (NORVASC) tablet 10 mg  10 mg Oral DAILY    [Held by provider] ascorbic acid (vitamin C) (VITAMIN C) tablet 500 mg  500 mg Oral DAILY    [Held by provider] cloNIDine HCL (CATAPRES) tablet 0.2 mg  0.2 mg Oral QHS    [Held by provider] metoprolol succinate (TOPROL-XL) XL tablet 50 mg  50 mg Oral QPM    sodium chloride (NS) flush 5-40 mL  5-40 mL IntraVENous Q8H    sodium chloride (NS) flush 5-40 mL  5-40 mL IntraVENous PRN    acetaminophen (TYLENOL) tablet 650 mg  650 mg Oral Q6H PRN    Or    acetaminophen (TYLENOL) suppository 650 mg  650 mg Rectal Q6H PRN    polyethylene glycol (MIRALAX) packet 17 g  17 g Oral DAILY PRN    ondansetron (ZOFRAN ODT) tablet 4 mg  4 mg Oral Q8H PRN    Or    ondansetron (ZOFRAN) injection 4 mg  4 mg IntraVENous Q6H PRN    LORazepam (ATIVAN) injection 1 mg  1 mg IntraVENous Q4H PRN    haloperidol lactate (HALDOL) injection 5 mg  5 mg IntraMUSCular Q6H PRN    hydrALAZINE (APRESOLINE) 20 mg/mL injection 10 mg  10 mg IntraVENous Q6H PRN

## 2023-03-30 NOTE — PROGRESS NOTES
Chart reviewed in preparation for physical therapy evaluation. Spoke with treating OT and RN. Patient currently in 10/10 pain (RN aware) and not able to participate in therapy evaluation at this time.     Thank you,  Zo Giraldo, PT, DPT

## 2023-03-30 NOTE — PROGRESS NOTES
Reason for Admission:  fell @ home ,seizure                     RUR Score:     8%                Plan for utilizing home health:          PCP: First and Last name:  Pietro Lanes, MD     Name of Practice:    Are you a current patient: Yes/No:    Approximate date of last visit:    Can you participate in a virtual visit with your PCP:                     Current Advanced Directive/Advance Care Plan: Full Code      Healthcare Decision Maker:                Primary Decision Maker: Stephy Edmonds  [phone]                  Transition of Care Plan:                       Problems:  Seizure  Hypokalemia  Rib fracture  Hypergycemia    Past medical history:  Hepatitis C  HTN  Pancreatitis    Per EMR,pt took some medication he bought online from [facility] to treat his hepatitis C. As pt is newly admitted ,I will follow-up with pt tomorrow .     Sanjuana Rmaírez

## 2023-03-31 ENCOUNTER — APPOINTMENT (OUTPATIENT)
Dept: MRI IMAGING | Age: 73
End: 2023-03-31
Attending: INTERNAL MEDICINE
Payer: MEDICARE

## 2023-03-31 VITALS
HEART RATE: 74 BPM | BODY MASS INDEX: 20.6 KG/M2 | TEMPERATURE: 98.7 F | RESPIRATION RATE: 16 BRPM | OXYGEN SATURATION: 100 % | DIASTOLIC BLOOD PRESSURE: 84 MMHG | SYSTOLIC BLOOD PRESSURE: 143 MMHG | HEIGHT: 69 IN | WEIGHT: 139.11 LBS

## 2023-03-31 LAB
ALBUMIN SERPL-MCNC: 3.5 G/DL (ref 3.5–5)
ALBUMIN/GLOB SERPL: 1.2 (ref 1.1–2.2)
ALP SERPL-CCNC: 44 U/L (ref 45–117)
ALT SERPL-CCNC: 43 U/L (ref 12–78)
ANION GAP SERPL CALC-SCNC: 2 MMOL/L (ref 5–15)
AST SERPL-CCNC: 89 U/L (ref 15–37)
BASOPHILS # BLD: 0 K/UL (ref 0–0.1)
BASOPHILS NFR BLD: 0 % (ref 0–1)
BILIRUB SERPL-MCNC: 1.1 MG/DL (ref 0.2–1)
BUN SERPL-MCNC: 8 MG/DL (ref 6–20)
BUN/CREAT SERPL: 12 (ref 12–20)
CALCIUM SERPL-MCNC: 9.8 MG/DL (ref 8.5–10.1)
CHLORIDE SERPL-SCNC: 108 MMOL/L (ref 97–108)
CO2 SERPL-SCNC: 29 MMOL/L (ref 21–32)
CREAT SERPL-MCNC: 0.67 MG/DL (ref 0.7–1.3)
DIFFERENTIAL METHOD BLD: ABNORMAL
EOSINOPHIL # BLD: 0 K/UL (ref 0–0.4)
EOSINOPHIL NFR BLD: 0 % (ref 0–7)
ERYTHROCYTE [DISTWIDTH] IN BLOOD BY AUTOMATED COUNT: 12.2 % (ref 11.5–14.5)
GLOBULIN SER CALC-MCNC: 2.9 G/DL (ref 2–4)
GLUCOSE BLD STRIP.AUTO-MCNC: 88 MG/DL (ref 65–117)
GLUCOSE BLD STRIP.AUTO-MCNC: 94 MG/DL (ref 65–117)
GLUCOSE SERPL-MCNC: 112 MG/DL (ref 65–100)
HCT VFR BLD AUTO: 38.4 % (ref 36.6–50.3)
HGB BLD-MCNC: 13.4 G/DL (ref 12.1–17)
IMM GRANULOCYTES # BLD AUTO: 0 K/UL (ref 0–0.04)
IMM GRANULOCYTES NFR BLD AUTO: 0 % (ref 0–0.5)
LYMPHOCYTES # BLD: 2.2 K/UL (ref 0.8–3.5)
LYMPHOCYTES NFR BLD: 18 % (ref 12–49)
MCH RBC QN AUTO: 31.5 PG (ref 26–34)
MCHC RBC AUTO-ENTMCNC: 34.9 G/DL (ref 30–36.5)
MCV RBC AUTO: 90.1 FL (ref 80–99)
MONOCYTES # BLD: 1.1 K/UL (ref 0–1)
MONOCYTES NFR BLD: 9 % (ref 5–13)
NEUTS SEG # BLD: 8.8 K/UL (ref 1.8–8)
NEUTS SEG NFR BLD: 73 % (ref 32–75)
NRBC # BLD: 0 K/UL (ref 0–0.01)
NRBC BLD-RTO: 0 PER 100 WBC
PLATELET # BLD AUTO: 155 K/UL (ref 150–400)
PMV BLD AUTO: 11.6 FL (ref 8.9–12.9)
POTASSIUM SERPL-SCNC: 4 MMOL/L (ref 3.5–5.1)
PROT SERPL-MCNC: 6.4 G/DL (ref 6.4–8.2)
RBC # BLD AUTO: 4.26 M/UL (ref 4.1–5.7)
SERVICE CMNT-IMP: NORMAL
SERVICE CMNT-IMP: NORMAL
SODIUM SERPL-SCNC: 139 MMOL/L (ref 136–145)
TSH SERPL DL<=0.05 MIU/L-ACNC: 3.49 UIU/ML (ref 0.36–3.74)
WBC # BLD AUTO: 12.3 K/UL (ref 4.1–11.1)

## 2023-03-31 PROCEDURE — 97530 THERAPEUTIC ACTIVITIES: CPT

## 2023-03-31 PROCEDURE — 97116 GAIT TRAINING THERAPY: CPT

## 2023-03-31 PROCEDURE — 85025 COMPLETE CBC W/AUTO DIFF WBC: CPT

## 2023-03-31 PROCEDURE — 70551 MRI BRAIN STEM W/O DYE: CPT

## 2023-03-31 PROCEDURE — 97535 SELF CARE MNGMENT TRAINING: CPT

## 2023-03-31 PROCEDURE — 97161 PT EVAL LOW COMPLEX 20 MIN: CPT

## 2023-03-31 PROCEDURE — 36415 COLL VENOUS BLD VENIPUNCTURE: CPT

## 2023-03-31 PROCEDURE — 84443 ASSAY THYROID STIM HORMONE: CPT

## 2023-03-31 PROCEDURE — 80053 COMPREHEN METABOLIC PANEL: CPT

## 2023-03-31 PROCEDURE — 74011250637 HC RX REV CODE- 250/637: Performed by: INTERNAL MEDICINE

## 2023-03-31 PROCEDURE — 82962 GLUCOSE BLOOD TEST: CPT

## 2023-03-31 RX ORDER — NALOXONE HYDROCHLORIDE 4 MG/.1ML
SPRAY NASAL
Qty: 1 EACH | Refills: 0 | Status: SHIPPED | OUTPATIENT
Start: 2023-03-31

## 2023-03-31 RX ORDER — OXYCODONE AND ACETAMINOPHEN 5; 325 MG/1; MG/1
1 TABLET ORAL
Qty: 16 TABLET | Refills: 0 | Status: SHIPPED | OUTPATIENT
Start: 2023-03-31 | End: 2023-04-04

## 2023-03-31 RX ORDER — OXYCODONE AND ACETAMINOPHEN 5; 325 MG/1; MG/1
1 TABLET ORAL
Qty: 12 TABLET | Refills: 0 | Status: SHIPPED | OUTPATIENT
Start: 2023-03-31 | End: 2023-03-31

## 2023-03-31 RX ADMIN — OXYCODONE HYDROCHLORIDE 5 MG: 5 TABLET ORAL at 08:24

## 2023-03-31 RX ADMIN — OXYCODONE HYDROCHLORIDE 5 MG: 5 TABLET ORAL at 04:13

## 2023-03-31 NOTE — PROGRESS NOTES
0700- Bedside and Verbal shift change report given to BRENDON Hayward  (oncoming nurse) by Renay Wright RN  (offgoing nurse). Report included the following information SBAR, Kardex, Intake/Output, MAR, Recent Results, Cardiac Rhythm SR, and Quality Measures. Primary Nurse Silke Callejas and Renay Wright RN performed a dual skin assessment on this patient Impairment noted- see wound doc flow sheet  Leandro score is see flow sheet. 0730- Dr. Kenya Laboy present at bedside assessing patient, pt likely to discharge home today pending Neuro clearance. 0800- Assessment completed, see flow sheet. Pt is alert and oriented x 4. PERRLA.  equal. Heart rate regular, SR on monitor, BP stable. Lungs clear, diminished, on RA. Pt with shallow breathes due to pain from rib fracture. RN educated patient on breathing and splinting with deep breathing. Bowel sounds active. Pt denies any chest pain, SOB, or nausea at this time. Pt voids using urinal. Scattered brusing to RUE, RLE and L eye brow. Pt turns and repositions self and RN encouraged turns to prevent pressure injury. 7818- PRN oxycodone administered per pt request for rib pain, see MAR.     6869- Spoke with MRI, plans for scan around 1549-1287 this AM.     7398- Labs drawn and sent. 1005-  Pt off unit to MRI with transport. 1036- Pt returned from MRI, requesting ibuprofen, currently only tylenol in MAR. Will message Dr. Kenya Laboy. 36- Dr. Kenya Laboy updated on patient request for ibuprofen, tolerating clear liquid diet and return from MRI. Anticipate new orders. 1200- Reassessment completed, flow sheet. [id number]- Per Floyde Olszewski Neuro NP, pt cleared from Neurology standpoint. [facility] present at bedside to work with patient. 12- Pt OK to discharge per Dr. Kenya Laboy and Charlotte WEBER, no further needs. 1533- Pt in own clothing, PIV removed, disconnected to continuous monitoring.  I have reviewed discharge instructions with the patient and spouse. The patient and spouse verbalized understanding. Pt and wife received copy of discharge instructions, all questions answered to satisfaction. E-signature pad malfunctioning. This RN and patient signed copy of discharge instructions and placed into patient's chart. Pt to be discharged home by wife, Robles Morel. Pt assisted to car by PCT with wheelchair. No further needs at this time.

## 2023-03-31 NOTE — PROGRESS NOTES
Problem: Mobility Impaired (Adult and Pediatric)  Goal: *Acute Goals and Plan of Care (Insert Text)  Description: FUNCTIONAL STATUS PRIOR TO ADMISSION: Patient was independent and active without use of DME.    HOME SUPPORT PRIOR TO ADMISSION: The patient lived with wife but did not require assist.    Physical Therapy Goals  Initiated 3/31/2023  1. Patient will move from supine to sit and sit to supine  in bed with independence within 7 day(s). 2.  Patient will transfer from bed to chair and chair to bed with independence using the least restrictive device within 7 day(s). 3.  Patient will perform sit to stand with independence within 7 day(s). 4.  Patient will ambulate with independence for 200 feet with the least restrictive device within 7 day(s). 5.  Patient will ascend/descend 7 stairs with single handrail(s) with modified independence within 7 day(s). Outcome: Not Met   PHYSICAL THERAPY EVALUATION  Patient: Marilee Smith (68 y.o. male)  Date: 3/31/2023  Primary Diagnosis: Seizure ([facility] [address] [R56.9]       Precautions: fall       ASSESSMENT  Based on the objective data described below, the patient presents with R ribcage and R foot pain, mild balance/ gait deficits, decreased safety awareness s/p fall at home with fx R 10th rib and subsequent seizure. R foot xray negative for fracture. Pt received with supportive wife present. Mobilized with CGA overall for transfers and amb on unit without device. Noted mild path deviations and occasional stagger during amb. Pt notes that he normally wears shoes with amb, even in home. Recommend use of shoes for gait training next session. Wife present and able to provide support at home. Recommend  PT for safety eval at d/c. Current Level of Function Impacting Discharge (mobility/balance): CGA amb without device    Other factors to consider for discharge:      Patient will benefit from skilled therapy intervention to address the above noted impairments. PLAN :  Recommendations and Planned Interventions: bed mobility training, transfer training, gait training, therapeutic exercises, patient and family training/education, and therapeutic activities      Frequency/Duration: Patient will be followed by physical therapy:  3 times a week to address goals. Recommendation for discharge: (in order for the patient to meet his/her long term goals)  Physical therapy at least 2 days/week in the home AND ensure assist and/or supervision for safety with mobility at initial d/c    This discharge recommendation:  Has been made in collaboration with the attending provider and/or case management    IF patient discharges home will need the following DME: none         SUBJECTIVE:   Patient joking throughout session    OBJECTIVE DATA SUMMARY:   HISTORY:    Past Medical History:   Diagnosis Date    Diverticulitis 2018    Hepatitis C 1992    Hypertension     Pancreatitis 2016     Past Surgical History:   Procedure Laterality Date    COLONOSCOPY N/A 2/23/2018    COLONOSCOPY performed by Tommy Coe MD at [facility]    HX TONSILLECTOMY  1955       Personal factors and/or comorbidities impacting plan of care: R foot pain    Home Situation  Home Environment: Private residence  # Steps to Enter: 4  One/Two Story Residence: Split level (split foyer, no \"main\" level)  # of Interior Steps: 7 (7 TO UPSTAIRS, OR 7 TO DOWNSTAIRS)  Interior Rails: Right  Living Alone: No  Support Systems: Spouse/Significant Other  Patient Expects to be Discharged to[de-identified] Home with family assistance  Current DME Used/Available at Home: None    EXAMINATION/PRESENTATION/DECISION MAKING:   Critical Behavior:  Neurologic State: Alert, Eyes open spontaneously  Orientation Level: Oriented X4  Cognition: Appropriate decision making, Appropriate for age attention/concentration, Appropriate safety awareness, Follows commands     Hearing:   Auditory  Auditory Impairment: None  Skin:  bruising R foot  Edema: present R foot  Range Of Motion:  AROM: Within functional limits                       Strength:    Strength: Generally decreased, functional                    Tone & Sensation:   Tone: Normal              Sensation: Intact               Coordination:  Coordination: Generally decreased, functional       Functional Mobility:  Bed Mobility:     Supine to Sit: Supervision        Transfers:  Sit to Stand: Contact guard assistance  Stand to Sit: Contact guard assistance                       Balance:   Sitting: Intact  Standing: Impaired  Standing - Static: Good  Standing - Dynamic : Fair  Ambulation/Gait Training:  Distance (ft): 150 Feet (ft)  Assistive Device: Gait belt  Ambulation - Level of Assistance: Contact guard assistance        Gait Abnormalities: Path deviations              Speed/Cherie: Pace decreased (<100 feet/min)           Physical Therapy Evaluation Charge Determination   History Examination Presentation Decision-Making   MEDIUM  Complexity : 1-2 comorbidities / personal factors will impact the outcome/ POC  MEDIUM Complexity : 3 Standardized tests and measures addressing body structure, function, activity limitation and / or participation in recreation  LOW Complexity : Stable, uncomplicated  LOW Complexity : FOTO score of       Based on the above components, the patient evaluation is determined to be of the following complexity level: LOW     Pain Rating:  Pt reports pain R ribcage and R foot    Activity Tolerance:   Good    After treatment patient left in no apparent distress:   Sitting in chair, Call bell within reach, and Caregiver / family present    COMMUNICATION/EDUCATION:   The patients plan of care was discussed with: Registered nurse. Fall prevention education was provided and the patient/caregiver indicated understanding.     Thank you for this referral.  Fahad Crandall, PT   Time Calculation: 16 mins

## 2023-03-31 NOTE — PROGRESS NOTES
Case Management note: Discharge pending neurology's evaluation and input regarding discharge    RUR 9%    Problems:    Ruling out Seizures-no seizures on EEG  Hypokalemia  Rib fracture  Hypergycemia     Past medical history:    Hepatitis C  HTN  Pancreatitis       Today:    I discussed pt with attending. Pending neurology's evaluation today,pt may be discharged. Per neurology ,EEG is negative for seizures and epileptiform discharges. MRI pending. I met with pt to discuss discharge needs. Pt lives with his wife,Nick who will transport pt home when discharged. Pt sees PCP:Dr Konstantin Cleaning or NP Herminio Mack  PT/OT identified no skilled needs . 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 At this time there are no identified home health,rehab or DME needs .     Pt has a follow-up appointment with Dr Ruby Rothman with the [facility] on 7/14/23 @ 3:30 pm.  As pt has a low RUR score,pt will make his own PCP follow-up appointment    Rommel Allen

## 2023-03-31 NOTE — PROGRESS NOTES
Medicare pt has received, reviewed, and signed  IM letter informing them of their right to appeal the discharge. Signed copy has been placed on pt bedside chart.   Jenna Burgess CMS

## 2023-03-31 NOTE — DISCHARGE SUMMARY
[facility]  [address] [facility], [facility] [address]  [phone]       PHYSICIAN DISCHARGE SUMMARY        Name:  Sonia Rivera, 67 y.o.  :    1950  MRN:    [id number]  PCP:    Alanis Roy MD    Code: Full Code    Date of Admission: 3/29/2023  Date of Discharge:   3/31/2023 2:38 PM  Disposition:  Pio Healy PT  Condition:  improved, stable, and good    Consultations:  IP CONSULT TO NEUROLOGY    Reason for Admission:   Seizure [facility]) [R56.9]    Discharge Diagnoses:    Principal Problem:    Seizure ([address] (3/29/2023)    Active Problems:    Hypokalemia (3/29/2023)      Leukocytosis (3/29/2023)      Rib fracture (3/29/2023)      Hyperglycemia (3/29/2023)      Fall (3/29/2023)      Hypercalcemia (3/29/2023)          Procedures:  Echo 3/30/23:    Left Ventricle: Normal left ventricular systolic function with a visually estimated EF of 60 - 65%. Left ventricle size is normal. LVIDd is 4.5 cm. Normal wall thickness. IVSd is 0.8 cm. LVPWd is 0.9 cm. Normal wall motion. Aortic Valve: Tricuspid valve. Aorta: Dilated ascending aorta. Ao Ascending diameter is 3.9 cm. Excerpted HPI from H&P of Candi Holden MD:  Mr. Suellen Ott is a 67 y.o.  male with PMH of hep C, HTN admitted s/p fall for AMS and then subsequently had a new onset seizure in the ED requiring administration of ativan subsequently leading to worsening confusion, restlessness. Per wife, heard a \"thump\" in the bathroom and noted that her  was on the floor and hit his head. Urine was everywhere. No prior h/o seizures. Denies EtOH abuse. Per wife, recently started a non-prescribed medication for his hep C; she is uncertain what it is called. He bought it online. Brief Hospital Course:  Patient was admitted for further work-up. Underwent MRI and additional imaging studies which were unremarkable. Also had EEG done which was unremarkable.   Did complain of foot pain on the right for which x-ray was done despite bruising there was no fracture. At this point he has been evaluated and cleared by neurology. They feel that this was a provoked seizure possibly due to the medication EPCLUSA which he purchased online with the hopes of treating hepatitis C. Patient has been advised to stop taking this medication. At this point patient will be discharged home continue his home medications except for the above-mentioned and I will also send him home with Percocet for back pain and rib fracture resulting from a fall. 3/31: Seen and examined. He is alert and oriented no acute issues overnight. No CP or SOB but complains of back pain. .. nonradiating and exacerbated from the bed.     3/30: Patient was seen and examined. He seems to be alert oriented. No further episodes of seizures. Wife was at bedside. He complained of right foot pain where there is bruising likely from the fall at home. Discharge diagnosis in summary/plan:    Seizure ([address] (3/29/2023)   No previous history this appears to be provoked possibly by medication EPCUSA  Stable no further episode  CT, MRI and  EEG unremarkable  Appear to be provoked so no AEDs at this time. Discharge per neurology with outpatient follow-up. No driving for 6 months per [facility] policy. Fall (3/29/2023)/syncope - ?unclear what transpired in the bathroom   Possibly seizure versus micturition syncope vs medication induced  No arrhythmias during stay  Echo 60-65%  Follow-up with PCP     Met encephalopathy - suspect 2/2 #1, post-ictal state, ativan   Likely postictal  Resolved       Hypokalemia (3/29/2023)  Repleted      Leukocytosis (3/29/2023) - ?etiology.  ?reactive to seizure  Likely stress-induced/reactive   undetectable procalc  chest CT, UA not c/w infectious process      Rib fracture (3/29/2023) right 10th rib POA Back pain POA  supportive care   We will discharge home with Percocet     Right foot pain  Improved  XR negative     Hyperglycemia (3/29/2023)  Likely reactive  A1c 5.3  Glucose checks with insulin sliding scale coverage     Hypercalcemia (3/29/2023)  check PTH  IVF's and monitor labs     HTN   resume home PO meds when able to safely swallow PO  PRN hydralazine     Visit Vitals  BP (!) 137/91   Pulse 67   Temp 98.4 °F (36.9 °C)   Resp 15   Ht 5' 9\" (1.753 m)   Wt 63.1 kg (139 lb 1.8 oz)   SpO2 96%   BMI 20.54 kg/m²       Physical Exam:  General: alert and no distress  Head: Normocephalic, without obvious abnormality, left supraorbital abrasion and bruising  Eyes: anicteric sclerae and conjuntiva clear  ENT: lips, mucosa, and tongue normal  Neck: normal, supple, and no tenderness  Lungs: clear to auscultation  Heart: S1, S2 normal, regular rate, and regular rhythm  Abd: not distended, soft, nontender, BS present and normactive  Ext: no cyanosis and no edema  Skin:  Bruising right foot dorsal aspect  Neuro:  alert, oriented, no defects noted in general exam.  Psych: not anxious, cooperative, appropriate affect      Labs:  Recent Labs     03/31/23  0426   WBC 12.3*   HGB 13.4   HCT 38.4        Recent Labs     03/31/23  0426 03/30/23  0058    141   K 4.0 3.0*    108   CO2 29 27   * 147*   BUN 8 7   CREA 0.67* 0.85   CA 9.8 9.7   MG  --  2.0   ALB 3.5  --    TBILI 1.1*  --    ALT 43  --      XR FOOT RT MIN 3 V    Result Date: 3/30/2023  No acute abnormality. MRI BRAIN WO CONT    Result Date: 3/31/2023  No acute infarct or intracranial hemorrhage. 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 Mild chronic microangiopathic white matter disease. Immunizations Given During Admission:   None        PATIENT INSTRUCTIONS       Activity: Activity as tolerated and No driving for 6 months  Diet: Cardiac Diet ADULT DIET Regular  Wound Care:  Keep wound clean and dry    Follow-up(s):   Follow-up Information       Follow up With Specialties Details Why Contact Info    Humberto Alvarado MD Family Medicine Follow up Please follow up with Dr Saintclair Ham as Dr Sumanth Shah has retired [place] [id number]  [phone]      Kayla Krabbe, MD [address] Olena Neumann MD Neurology Schedule an appointment as soon as possible for a visit in 4 week(s) Hospital Follow up [address]  [phone]             Current Discharge Medication List        START taking these medications    Details   naloxone [facility]) 4 mg/actuation nasal spray Use 1 spray intranasally, then discard. Repeat with new spray every 2 min as needed for opioid overdose symptoms, alternating nostrils. Qty: 1 Each, Refills: 0           CONTINUE these medications which have CHANGED    Details   oxyCODONE-acetaminophen (Percocet) 5-325 mg per tablet Take 1 Tablet by mouth every six (6) hours as needed for Pain for up to 4 days. Max Daily Amount: 4 Tablets. Qty: 16 Tablet, Refills: 0    Associated Diagnoses: Closed fracture of one rib of right side, initial encounter           CONTINUE these medications which have NOT CHANGED    Details   amLODIPine (NORVASC) 10 mg tablet Take 10 mg by mouth daily. Indications: hypertension      multivitamin with iron tablet Take 1 Tab by mouth daily. ascorbic acid, vitamin C, (VITAMIN C) 500 mg tablet Take 500 mg by mouth daily. b complex vitamins tablet Take 1 Tab by mouth daily. cyanocobalamin (VITAMIN B12) 500 mcg tablet Take 500 mcg by mouth daily. cholecalciferol (VITAMIN D3) (1000 Units /25 mcg) tablet Take 1,000 Units by mouth daily.            STOP taking these medications       sofosbuvir-velpatasvir (EPCLUSA) 400-100 mg tablet Comments:   Reason for Stopping:         oxyCODONE-acetaminophen (PERCOCET 7.5) 7.5-325 mg per tablet Comments:   Reason for Stopping:         ALPRAZolam (XANAX) 1 mg tablet Comments:   Reason for Stopping: metoprolol succinate (TOPROL-XL) 50 mg XL tablet Comments:   Reason for Stopping:         cloNIDine HCl (CATAPRES) 0.2 mg tablet Comments:   Reason for Stopping:         traMADol (ULTRAM) 50 mg tablet Comments:   Reason for Stopping:               Discharge Plan D/W:  Patient, RN, Specialist, and Care Manager          **Total time spent coordinating discharge (preparing & going over instructions, follow-ups, prescriptions, and documentation):  32 minutes               _____________________________________    Signed:   Riki Hernandez MD   Date of service:    3/31/2023         CC:  Juan Diego Perez MD

## 2023-03-31 NOTE — PROGRESS NOTES
[facility] [place]  [address] [facility], [facility]  [phone]         Hospitalist Progress Note        NAME:  Kathy Escalante   :  1950   MRN:  [id number]    Date/Time:  3/31/2023     Patient PCP:  Kayla Krabbe, MD    Emergency Contact:    Extended Emergency Contact Information  Primary Emergency Contact: [address] Phone: [phone]  Relation: Spouse      Code: Full Code     Isolation Precautions: There are currently no Active Isolations        Subjective:     REASON FOR VISIT:  Recheck AMS/SZ     HPI & INTERVAL HISTORY:     Mr. Donavon Orta is a 67 y.o.  male with PMH of hep C, HTN admitted s/p fall for AMS and then subsequently had a new onset seizure in the ED requiring administration of ativan subsequently leading to worsening confusion, restlessness. Per wife, heard a \"thump\" in the bathroom and noted that her  was on the floor and hit his head. Urine was everywhere. No prior h/o seizures. Denies EtOH abuse. Per wife, recently started a non-prescribed medication for his hep C; she is uncertain what it is called. He bought it online. 3/31: Seen and examined. He is alert and oriented no acute issues overnight. No CP or SOB but complains of back pain. .. nonradiating and exacerbated from the bed.    3/30: Patient was seen and examined. He seems to be alert oriented. No further episodes of seizures. Wife was at bedside. He complained of right foot pain where there is bruising likely from the fall at home.       ALLERGIES  No Known Allergies           Objective:      Visit Vitals  BP (!) 162/89 (BP 1 Location: Left upper arm, BP Patient Position: At rest)   Pulse 72   Temp 98.8 °F (37.1 °C)   Resp 20   Ht 5' 9\" (1.753 m)   Wt 63.1 kg (139 lb 1.8 oz)   SpO2 96%   BMI 20.54 kg/m²       General: alert and no distress  Head: Normocephalic, without obvious abnormality, left supraorbital abrasion and bruising  Eyes: anicteric sclerae and conjuntiva clear  ENT: lips, mucosa, and tongue normal  Neck: normal, supple, and no tenderness  Lungs: clear to auscultation  Heart: S1, S2 normal, regular rate, and regular rhythm  Abd: not distended, soft, nontender, BS present and normactive  Ext: no cyanosis and no edema  Skin:  Bruising right foot dorsal aspect  Neuro:  alert, oriented, no defects noted in general exam.  Psych: not anxious, cooperative, appropriate affect      Medications:  Current Facility-Administered Medications   Medication Dose Route Frequency    HYDROmorphone (DILAUDID) injection 1 mg  1 mg IntraVENous Q4H PRN    [Held by provider] acetaminophen (TYLENOL) tablet 650 mg  650 mg Oral Q6H    0.9% sodium chloride with KCl 40 mEq/L infusion   IntraVENous CONTINUOUS    sodium chloride (NS) flush 5-40 mL  5-40 mL IntraVENous Q8H    sodium chloride (NS) flush 5-40 mL  5-40 mL IntraVENous PRN    naloxone (NARCAN) injection 0.4 mg  0.4 mg IntraVENous PRN    ondansetron (ZOFRAN) injection 4 mg  4 mg IntraVENous Q4H PRN    insulin lispro (HUMALOG) injection   SubCUTAneous AC&HS    glucose chewable tablet 16 g  4 Tablet Oral PRN    glucagon (GLUCAGEN) injection 1 mg  1 mg IntraMUSCular PRN    dextrose 10% infusion 0-250 mL  0-250 mL IntraVENous PRN    oxyCODONE IR (ROXICODONE) tablet 5 mg  5 mg Oral Q4H PRN    ALPRAZolam (XANAX) tablet 1 mg  1 mg Oral QHS PRN    [Held by provider] amLODIPine (NORVASC) tablet 10 mg  10 mg Oral DAILY    [Held by provider] ascorbic acid (vitamin C) (VITAMIN C) tablet 500 mg  500 mg Oral DAILY    [Held by provider] cloNIDine HCL (CATAPRES) tablet 0.2 mg  0.2 mg Oral QHS    [Held by provider] metoprolol succinate (TOPROL-XL) XL tablet 50 mg  50 mg Oral QPM    sodium chloride (NS) flush 5-40 mL  5-40 mL IntraVENous Q8H    sodium chloride (NS) flush 5-40 mL  5-40 mL IntraVENous PRN    acetaminophen (TYLENOL) tablet 650 mg  650 mg Oral Q6H PRN    Or    acetaminophen (TYLENOL) suppository 650 mg  650 mg Rectal Q6H PRN    polyethylene glycol (MIRALAX) packet 17 g  17 g Oral DAILY PRN    ondansetron (ZOFRAN ODT) tablet 4 mg  4 mg Oral Q8H PRN    Or    ondansetron (ZOFRAN) injection 4 mg  4 mg IntraVENous Q6H PRN    LORazepam (ATIVAN) injection 1 mg  1 mg IntraVENous Q4H PRN    haloperidol lactate (HALDOL) injection 5 mg  5 mg IntraMUSCular Q6H PRN    hydrALAZINE (APRESOLINE) 20 mg/mL injection 10 mg  10 mg IntraVENous Q6H PRN        Labs:  Recent Labs     03/31/23 0426   WBC 12.3*   HGB 13.4   HCT 38.4          Recent Labs     03/31/23 0426 03/30/23  0058    141   K 4.0 3.0*    108   CO2 29 27   * 147*   BUN 8 7   CREA 0.67* 0.85   CA 9.8 9.7   MG  --  2.0   ALB 3.5  --    TBILI 1.1*  --    ALT 43  --            Radiology:  XR FOOT RT MIN 3 V    Result Date: 3/30/2023  No acute abnormality. I personally reviewed and interpreted the imaging studies and agree with official reading    The External notes reviewed, ER course, labs, imaging studies, and medications was reviewed by me on: March 31, 2023         Assessment/Plan:      Seizure [facility]) (3/29/2023)   unusual that pt is having a seizure for the first time in his life. ?withdrawal from medication ?substance such as this new medication that is unclear what is is and not prescribed  Stable no further episode  head CT as above   MRI brain PENDING  EEG unremarkable  neurology consult pending  B12/folate, ammonia are wnL  ADD TSH     Fall (3/29/2023)/syncope - ?unclear what transpired in the bathroom   Possibly seizure versus micturition syncope  Telemetry monitoring  Echo 60-65%    Met encephalopathy - suspect 2/2 #1, post-ictal state, ativan   Likely postictal  Resolved       Hypokalemia (3/29/2023)  replete and monitor     Leukocytosis (3/29/2023) - ?etiology.  ?reactive to seizure  Undetectable procalc  chest CT, UA not c/w infectious process      Rib fracture (3/29/2023) / Back pain  supportive care   Pain control with tyl, oxy, dilaudid IV prn    Right foot pain  Improved  XR negative     Hyperglycemia (3/29/2023)  Likely reactive  A1c 5.3  Glucose checks with insulin sliding scale coverage    Hypercalcemia (3/29/2023)  check PTH  IVF's and monitor labs     HTN   resume home PO meds when able to safely swallow PO  PRN hydralazine       Body mass index is 20.54 kg/m².:  18.5 - 24.9:  Normal weight    F: NS KCl 20 meq 75 ml/hr  E: Hyperkalemia and Will replace and monitor  N: ADULT DIET Clear Liquid       Risk of deterioration: Moderate     Discussed:  Pt's condition, Imaging findings, Lab findings, Assessment, and Care Plan discussed with: Patient, Spouse at bedside , RN, and Care Manager    Prophylaxis:  SCD's    Anticipated discharge disposition:  Home with family        Total time: -25- minutes **I personally saw and examined the patient during this time period**                   ___________________________________________________    Signed:    Jess Boyer MD

## 2023-03-31 NOTE — DISCHARGE INSTRUCTIONS
DMV Seizure/Blackout Policy  ([email or web address])    It is the policy of the Department of Motor Vehicles, based upon guidance and recommendations from the Medical Advisory Board, that an individual must be free of seizures for at least six months in order to establish that medication for the seizure or underlying cause of the seizure is effective and/or that none of the medical conditions that may have caused the seizure have reoccurred. Monitoring and Review of the   If an individual has a history of seizures, the individual and his/her physician must complete a Customer Medical Report ([email or web address]. pdf) before a [address] license may be issued. Once the medical report is received by [facility], it is reviewed to determine if the individual may be licensed to drive a motor vehicle. [facility] reserves the right to request additional information in order to assess the persons ability to safely operate a motor vehicle. Once the individual is licensed to operate a motor vehicle, he/she will be placed on periodic medical review with [facility] based on the medical information received, and will be required to furnish medical reports to [facility] every three, six, twelve or twenty-four months. [facility] may impose additional requirements on the individual depending on the information received by the agency. If an individual is currently licensed and [facility] is notified that this individual has experienced a seizure, his/her driving privilege will be suspended for a period of six months from the date of the last episode. At the end of the six-month period, the individual must submit a Customer Medical Report (BloggingAssistance.[email or web address]. pdf) to [facility] with Parts A, F and the Customer Information Sections completed so that the agency may determine whether the individuals driving privilege may be reinstated.  [facility] may also impose additional requirements on the individual depending on the information received by the agency. Unexplained Blackout/Loss of Consciousness   If an individual suffers an unexplained blackout or loss of consciousness (negative medical work-up with no defined cause), DMV will suspend the driving privilege for a period of 6 months from the date of the unexplained blackout, alteration of awareness, or loss of consciousness. At the end of the 6-month suspension period, the  is required to furnish a medical report to indicate that he/she was examined exactly 6 months from the date of his/her last blackout or loss of consciousness and has not suffered a subsequent blackout or loss of consciousness since that date. DMV may also impose additional requirements on the individual depending on the information received by the agency. Breakthrough Seizures   If an individual suffers a breakthrough seizure, defined as a seizure due to non-compliance (missed medication), physician manipulation of the drug regimen/dosage due to side effects, pregnancy, sleep deprivation, or concomitant illnesses, the individual may resume driving three months after the seizure. Proof of compliance may be requested. Isolated (Single-Event) Seizures  If an individual has an isolated (single-event) seizure with a clearly identified cause for which treatment has been completed and with no risk of reoccurrence, the suspension period may be reduced to three months provided there is documentation from the individuals neurologist that the individual is now stable and no unfavorable conditions have been noted.  Unfavorable conditions include, but are not limited to:  A positive family history of seizures   The seizure was focal in origin   The EEG showed focal or generalized spikes   Neurological deficits were noted before the seizure              Patient Discharge Instructions    Corinne Buys / [id number] : 1950    Admitted 3/29/2023 Discharged: 3/31/2023       Discharge Medications: It is important that you take the medication exactly as they are prescribed. Keep your medication in the bottles provided by the pharmacist and keep a list of the medication names, dosages, and times to be taken in your wallet. Do not take other medications without consulting your doctor. Call your PCP for medication refills      What to do at Home    Take medications as prescribed and follow up with PCP and Neurology. Call your PCP for refills of your medications. Recommended Diet: Cardiac Diet       Recommended Activity: No driving for at least 6 months as stated above per [place] DMV policy. **If you experience any of the following symptoms chest pain, shortness of breath, fevers, chills, or dizziness, please follow up with PCP or go to the nearest ER.**      It was a pleasure taking care of you I hope you recover soon. Please stop taking sofosbuvir-velpatasvir. May have contributed to your fall and seizure or the fall may have contributed to seizure unclear right now. You do not need antiseizure medication but he should stop this medication and not restart until discussion with your primary care. Once again take care and good luck.          Ting Gongora MD     March 31, 2023

## 2023-03-31 NOTE — PROGRESS NOTES
I notified attending regarding PT's recommendation for home health. Home health PT order obtained from attending. I discussed [facility] of choice with pt and [facility] of choice letter signed and placed in pt.'s chart to be scanned. Choices for home health: [facility] ,[address] [place] [address],[address] ot=r any company that accepts [employer],    Order with clinicals faxed to [facility]. If accepted ,I will place on AVS .  As pt has a low RUR,pt will kashmir=ke his own PCP appointment.     Barb Perez

## 2023-03-31 NOTE — PROGRESS NOTES
Chart and MRI reviewed, no evidence of intercranial process. Agree with Dr. Yareli Trimble that this was a provoked seizure. AED's not indicated at this time. Discussed [place] related to [place] with patient and wife.

## 2023-03-31 NOTE — PROGRESS NOTES
Problem: Self Care Deficits Care Plan (Adult)  Goal: *Therapy Goal (Edit Goal, Insert Text)  Description: FUNCTIONAL STATUS PRIOR TO ADMISSION: Patient was independent and active without use of DME. Lives with spouse in split foyer home with 7 steps to upstaits and to downstairs levels. Occupational Therapy  Initiated 3/30/23  1. Patient will perform grooming in stand with modified independence within 7 day(s). 2.  Patient will perform bathing with modified independence within 7 day(s). 3.  Patient will perform lower body dressing with modified independence within 7 day(s). 4.  Patient will perform toilet transfers with modified independence within 7 day(s). 5.  Patient will perform all aspects of toileting with modified independence within 7 day(s). Outcome: Progressing Towards Goal   OCCUPATIONAL THERAPY TREATMENT  Patient: Soraida Giordano (89 y.o. male)  Date: 3/31/2023  Diagnosis: Seizure ([facility] [R56.9] Seizure ([facility]      Precautions:  fall  Chart, occupational therapy assessment, plan of care, and goals were reviewed. ASSESSMENT  Patient continues with skilled OT services and is progressing towards goals. Mr. Ashley Wilson was received in bed agreeable to activity. He performed bed mobility, x4 sit to stand transfers, short distance ambulation, toilet transfer, and transfer to the recliner to remain OOB to the chair at end of tx. Patient performed all aspects of toileting, standing grooming, and UB/LB dressing. Patient was educated on adaptive ADL techniques, safe transfer techniques, and energy conservation techniques. Patient would benefit from continued skilled OT to progress towards goals and improve overall independence. Current Level of Function Impacting Discharge (ADLs): Functional mobility, Supervision-CGA; ADLs, independent- mod I           PLAN :  Patient continues to benefit from skilled intervention to address the above impairments.   Continue treatment per established plan of care to address goals. Recommend with staff:   Recommend patient be OOB to chair as frequently as tolerated; Goal of 3x/day for all meals for 60 minutes at a time. For toileting needs, recommend transfer to/from [facility]. Encourage patient involvement in personal care as able. Encourage exercises frequently throughout the day. Recommend next OT session: Continue towards set OT goals. Recommendation for discharge: (in order for the patient to meet his/her long term goals)  No skilled occupational therapy/ follow up rehabilitation needs identified at this time. This discharge recommendation:  Has been made in collaboration with the attending provider and/or case management    IF patient discharges home will need the following DME: none       SUBJECTIVE:   Patient agreeable to OT tx.       OBJECTIVE DATA SUMMARY:   Cognitive/Behavioral Status:  Neurologic State: Alert  Orientation Level: Oriented X4  Cognition: Follows commands  Perception: Appears intact  Perseveration: No perseveration noted  Safety/Judgement: Awareness of environment    Functional Mobility and Transfers for ADLs:  Bed Mobility:  Supine to Sit: Supervision    Transfers:  Sit to Stand: Contact guard assistance  Functional Transfers  Toilet Transfer : Contact guard assistance       Balance:  Sitting: Intact  Standing: Impaired  Standing - Static: Good  Standing - Dynamic : Fair    ADL Intervention:  Grooming  Grooming Assistance: Independent    Upper Body Dressing Assistance  Dressing Assistance: Independent    Lower Body Dressing Assistance  Dressing Assistance: Modified independent  Underpants: Modified independent  Socks: Modified independent  Position Performed: Seated edge of bed  Cues: Verbal cues provided    Toileting  Toileting Assistance: Modified independent  Bladder Hygiene: Modified independent  Bowel Hygiene: Modified indpendent  Clothing Management: Modified independent  Cues: Verbal cues provided  Adaptive Equipment: Grab bars    Cognitive Retraining  Safety/Judgement: Awareness of environment    Activity Tolerance:   Good    After treatment patient left in no apparent distress:   Sitting in chair, Call bell within reach, Bed / chair alarm activated, and Caregiver / family present    COMMUNICATION/COLLABORATION:   The patients plan of care was discussed with: Physical therapist, Registered nurse, and patient .      Tracy Nieves OTR/L  Time Calculation: 45 mins
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
## 2023-05-10 ENCOUNTER — ANESTHESIA EVENT (OUTPATIENT)
Facility: HOSPITAL | Age: 73
End: 2023-05-10
Payer: MEDICARE

## 2023-05-10 ENCOUNTER — ANESTHESIA (OUTPATIENT)
Facility: HOSPITAL | Age: 73
End: 2023-05-10
Payer: MEDICARE

## 2023-05-10 ENCOUNTER — HOSPITAL ENCOUNTER (OUTPATIENT)
Facility: HOSPITAL | Age: 73
Setting detail: OUTPATIENT SURGERY
Discharge: HOME | End: 2023-05-10
Attending: INTERNAL MEDICINE | Admitting: INTERNAL MEDICINE
Payer: MEDICARE

## 2023-05-10 VITALS
BODY MASS INDEX: 21.22 KG/M2 | HEART RATE: 57 BPM | TEMPERATURE: 98.2 F | HEIGHT: 69 IN | OXYGEN SATURATION: 100 % | SYSTOLIC BLOOD PRESSURE: 116 MMHG | DIASTOLIC BLOOD PRESSURE: 85 MMHG | RESPIRATION RATE: 12 BRPM | WEIGHT: 143.3 LBS

## 2023-05-10 PROCEDURE — 2580000003 HC RX 258: Performed by: INTERNAL MEDICINE

## 2023-05-10 PROCEDURE — 7100000010 HC PHASE II RECOVERY - FIRST 15 MIN: Performed by: INTERNAL MEDICINE

## 2023-05-10 PROCEDURE — 88305 TISSUE EXAM BY PATHOLOGIST: CPT

## 2023-05-10 PROCEDURE — 3700000001 HC ADD 15 MINUTES (ANESTHESIA): Performed by: INTERNAL MEDICINE

## 2023-05-10 PROCEDURE — 3600007512: Performed by: INTERNAL MEDICINE

## 2023-05-10 PROCEDURE — 3700000000 HC ANESTHESIA ATTENDED CARE: Performed by: INTERNAL MEDICINE

## 2023-05-10 PROCEDURE — 2709999900 HC NON-CHARGEABLE SUPPLY: Performed by: INTERNAL MEDICINE

## 2023-05-10 PROCEDURE — 7100000011 HC PHASE II RECOVERY - ADDTL 15 MIN: Performed by: INTERNAL MEDICINE

## 2023-05-10 PROCEDURE — 6360000002 HC RX W HCPCS: Performed by: NURSE ANESTHETIST, CERTIFIED REGISTERED

## 2023-05-10 PROCEDURE — 3600007502: Performed by: INTERNAL MEDICINE

## 2023-05-10 RX ORDER — ALPRAZOLAM 1 MG/1
1 TABLET ORAL 2 TIMES DAILY PRN
COMMUNITY
Start: 2023-02-13

## 2023-05-10 RX ORDER — CLONAZEPAM 1 MG/1
TABLET ORAL
COMMUNITY
Start: 2023-05-05

## 2023-05-10 RX ORDER — SODIUM CHLORIDE 0.9 % (FLUSH) 0.9 %
5-40 SYRINGE (ML) INJECTION EVERY 12 HOURS SCHEDULED
Status: DISCONTINUED | OUTPATIENT
Start: 2023-05-10 | End: 2023-05-10 | Stop reason: HOSPADM

## 2023-05-10 RX ORDER — LIDOCAINE HYDROCHLORIDE 20 MG/ML
INJECTION, SOLUTION INTRAVENOUS PRN
Status: DISCONTINUED | OUTPATIENT
Start: 2023-05-10 | End: 2023-05-10 | Stop reason: SDUPTHER

## 2023-05-10 RX ORDER — ASCORBIC ACID 500 MG
500 TABLET ORAL DAILY
COMMUNITY

## 2023-05-10 RX ORDER — SODIUM CHLORIDE 9 MG/ML
25 INJECTION, SOLUTION INTRAVENOUS PRN
Status: DISCONTINUED | OUTPATIENT
Start: 2023-05-10 | End: 2023-05-10 | Stop reason: HOSPADM

## 2023-05-10 RX ORDER — OXYCODONE HYDROCHLORIDE 5 MG/1
CAPSULE ORAL
COMMUNITY
Start: 2023-05-04

## 2023-05-10 RX ORDER — AMLODIPINE BESYLATE 10 MG/1
10 TABLET ORAL DAILY
COMMUNITY

## 2023-05-10 RX ORDER — PROPOFOL 10 MG/ML
INJECTION, EMULSION INTRAVENOUS PRN
Status: DISCONTINUED | OUTPATIENT
Start: 2023-05-10 | End: 2023-05-10 | Stop reason: SDUPTHER

## 2023-05-10 RX ORDER — SODIUM CHLORIDE 0.9 % (FLUSH) 0.9 %
5-40 SYRINGE (ML) INJECTION PRN
Status: DISCONTINUED | OUTPATIENT
Start: 2023-05-10 | End: 2023-05-10 | Stop reason: HOSPADM

## 2023-05-10 RX ADMIN — LIDOCAINE HYDROCHLORIDE 100 MG: 20 INJECTION, SOLUTION INTRAVENOUS at 14:59

## 2023-05-10 RX ADMIN — PROPOFOL 50 MG: 10 INJECTION, EMULSION INTRAVENOUS at 14:59

## 2023-05-10 RX ADMIN — SODIUM CHLORIDE: 9 INJECTION, SOLUTION INTRAVENOUS at 14:08

## 2023-05-10 ASSESSMENT — PAIN - FUNCTIONAL ASSESSMENT: PAIN_FUNCTIONAL_ASSESSMENT: 0-10

## 2023-05-10 NOTE — PROGRESS NOTES
1458  Timeout performed. Anesthesia staff at patient's bedside administering anesthesia and monitoring patients vital signs throughout procedure. See anesthesia note. Post procedure, report received from Hayley MENDOZA. 18  Endoscope was pre-cleaned at bedside immediately following procedure by Sara roy RN.    0968  Patient tolerated procedure. Abdomen soft and patient arousable and voices no complaints. Patient transported to endoscopy recovery area. Report given to post procedure RNAnastacio.

## 2023-05-10 NOTE — H&P
Gris Kelsey M.D.  [phone]            History and Physical       NAME:  Janeth    :   1950   MRN:   [id number]       Referring Physician:  VASYL Temple NP      Consult Date: 5/10/2023 1:42 PM    Chief Complaint:  Colon cancer screening    History of Present Illness:  Patient is a 68 y.o. who is seen for colon cancer screening. Denies any ongoing GI complaints. PMH:  Past Medical History:   Diagnosis Date    Diverticulitis     Hepatitis C 1992    Hypertension     Pancreatitis        PSH:  Past Surgical History:   Procedure Laterality Date    COLONOSCOPY N/A 2018    COLONOSCOPY performed by Delvis Salvador MD at [address]       Allergies:  Not on File    Home Medications:  None       Hospital Medications:  No current facility-administered medications for this encounter. Social History:  Social History     Tobacco Use    Smoking status: Never    Smokeless tobacco: Never   Substance Use Topics    Alcohol use: No       Family History:  Family History   Problem Relation Age of Onset    Cancer Father              Review of Systems:      Constitutional: negative fever, negative chills, negative weight loss  Eyes:   negative visual changes  ENT:   negative sore throat, tongue or lip swelling  Respiratory:  negative cough, negative dyspnea  Cards:  negative for chest pain, palpitations, lower extremity edema  GI:   See HPI  :  negative for frequency, dysuria  Integument:  negative for rash and pruritus  Heme:  negative for easy bruising and gum/nose bleeding  Musculoskel: negative for myalgias,  back pain and muscle weakness  Neuro: negative for headaches, dizziness, vertigo  Psych:  negative for feelings of anxiety, depression       Objective:   No data found. No intake/output data recorded. No intake/output data recorded.     EXAM:     NEURO-a&o   HEENT-wnl   LUNGS-clear    COR-regular rate and rhythym

## 2023-05-10 NOTE — ANESTHESIA PRE PROCEDURE
Value Date/Time    PROTIME 10.6 03/29/2023 05:43 PM    INR 1.0 03/29/2023 05:43 PM       HCG (If Applicable): No results found for: PREGTESTUR, PREGSERUM, HCG, HCGQUANT     ABGs: No results found for: PHART, PO2ART, ALE8OAF, KRC4HRZ, BEART, N9LLBTFV     Type & Screen (If Applicable):  No results found for: LABABO, LABRH    Drug/Infectious Status (If Applicable):  No results found for: HIV, HEPCAB    COVID-19 Screening (If Applicable): No results found for: COVID19        Anesthesia Evaluation  Patient summary reviewed and Nursing notes reviewed  Airway: Mallampati: II  TM distance: >3 FB   Neck ROM: full  Mouth opening: > = 3 FB   Dental: normal exam         Pulmonary:Negative Pulmonary ROS breath sounds clear to auscultation                             Cardiovascular:    (+) hypertension: mild,         Rhythm: regular  Rate: normal                    Neuro/Psych:               GI/Hepatic/Renal:             Endo/Other:                     Abdominal:             Vascular: Other Findings:           Anesthesia Plan      MAC     ASA 2             Anesthetic plan and risks discussed with patient. Plan discussed with CRNA.                     Hortencia Barreto MD   5/10/2023

## 2023-05-10 NOTE — PROCEDURES
pathology  #3, 20 mm in size, located in the descending colon removed by snare cautery and retrieved for pathology    Complications: None. EBL:  None. Impression:  3 polyps removed as above                        Mild left colonic diverticulosis                        Evidence of prior sigmoidectomy with end to end anastomosis noted    Recommendations:  -Await pathology. -Repeat colonoscopy in 3 years. Discharge Disposition:  Home in the company of a  when able to ambulate.     Cassy Coy MD  5/10/2023  3:19 PM

## 2023-05-10 NOTE — DISCHARGE INSTRUCTIONS
5/10/2023    Rebecka Brunner McDonald  :  1950  [place] Medical Record Number:  [id number]      COLONOSCOPY FINDINGS:  Your colonoscopy showed: 3 large polyps and diverticulosis noted. DISCOMFORT:  Redness at IV site- apply warm compress to area; if redness or soreness persist- contact your physician  There may be a slight amount of blood passed from the rectum  Gaseous discomfort- walking, belching will help relieve any discomfort  You may not operate a vehicle for 12 hours  You may not engage in an occupation involving machinery or appliances for rest of today  You may not drink alcoholic beverages for at least 12 hours  Avoid making any critical decisions for at least 24 hour  DIET:   in the home   - however -  remember your colon is empty and a heavy meal will produce gas. Avoid these foods:  vegetables, fried / greasy foods, carbonated drinks for today     ACTIVITY:  You may resume your normal daily activities it is recommended that you spend the remainder of the day resting -  avoid any strenuous activity. CALL M.D. ANY SIGN OF:   Increasing pain, nausea, vomiting  Abdominal distension (swelling)  New increased bleeding (oral or rectal)  Fever (chills)  Pain in chest area  Bloody discharge from nose or mouth   Shortness of breath    Follow-up Instructions:   Call Dr. Selvin Rosario if any questions or problems. Telephone # [phone]  Biopsy results will be available in  5 to 7 days  Should have a repeat colonoscopy in 3 years.

## 2023-05-10 NOTE — PROGRESS NOTES
Endoscopy discharge instructions have been reviewed and given to patient. The patient verbalized understanding and acceptance of instructions. Dr. Kelton Peterson discussed with patient procedure findings and next steps.

## 2023-05-10 NOTE — PROGRESS NOTES
Wayne De La Fuente  1950  [id number]    Situation:  Verbal report received from: [facility] RN   Procedure: Procedure(s) with comments:  COLONOSCOPY - with biopsy    Background:    Preoperative diagnosis: Screen for colon cancer [Z12.11]  Postoperative diagnosis: * No post-op diagnosis entered *    :  Dr. Ciara Vu  Assistant(s): Circulator: Judith Cuba RN  Circulator Assist: Wilton Poole RN    Specimens:   ID Type Source Tests Collected by Time Destination   A : ascending polyp Tissue Colon-Ascending SURGICAL PATHOLOGY Amanda Costello MD 5/10/2023 1506    B : Transverse Tissue Colon-Transverse SURGICAL PATHOLOGY Amanda Costello MD 5/10/2023 1514      H. Pylori  No    Assessment:    Anesthesia gave intra-procedure sedation and medications, see anesthesia flow sheet No    Intravenous fluids: NS@ KVO     Vital signs stable     Abdominal assessment: round and soft     Recommendation:  Discharge patient per MD order.   Return to floor  Family or Friend   Permission to share finding with family or friend Yes

## 2023-05-11 NOTE — ANESTHESIA POSTPROCEDURE EVALUATION
Department of Anesthesiology  Postprocedure Note    Patient: Gaby Ray  MRN: [id number]  YOB: 1950  Date of evaluation: 5/11/2023      Procedure Summary     Date: 05/10/23 Room / Location: [facility] 03 / [facility] ENDOSCOPY    Anesthesia Start: 5782 Anesthesia Stop: 1416    Procedure: COLONOSCOPY (Lower GI Region) Diagnosis:       Screen for colon cancer      (Screen for colon cancer [Z12.11])    Surgeons: Yazan Fowler MD Responsible Provider: Ivory Abbott MD    Anesthesia Type: MAC ASA Status: 2          Anesthesia Type: No value filed.     Candelario Phase I: Candelario Score: 10    Candelario Phase II: Candelario Score: 10      Anesthesia Post Evaluation    Patient location during evaluation: bedside  Patient participation: complete - patient participated  Level of consciousness: awake  Airway patency: patent  Nausea & Vomiting: no vomiting and no nausea  Complications: no  Cardiovascular status: hemodynamically stable  Respiratory status: acceptable  Hydration status: stable

## 2023-07-14 ENCOUNTER — OFFICE VISIT (OUTPATIENT)
Age: 73
End: 2023-07-14
Payer: MEDICARE

## 2023-07-14 VITALS
DIASTOLIC BLOOD PRESSURE: 79 MMHG | WEIGHT: 146.8 LBS | HEIGHT: 69 IN | OXYGEN SATURATION: 98 % | SYSTOLIC BLOOD PRESSURE: 111 MMHG | HEART RATE: 71 BPM | TEMPERATURE: 98 F | BODY MASS INDEX: 21.74 KG/M2

## 2023-07-14 DIAGNOSIS — R74.8 ELEVATED LIVER ENZYMES: Primary | ICD-10-CM

## 2023-07-14 PROBLEM — B18.2 CHRONIC HEPATITIS C (HCC): Status: ACTIVE | Noted: 2023-07-14

## 2023-07-14 PROCEDURE — 99203 OFFICE O/P NEW LOW 30 MIN: CPT | Performed by: INTERNAL MEDICINE

## 2023-07-14 PROCEDURE — G8427 DOCREV CUR MEDS BY ELIG CLIN: HCPCS | Performed by: INTERNAL MEDICINE

## 2023-07-14 PROCEDURE — G8420 CALC BMI NORM PARAMETERS: HCPCS | Performed by: INTERNAL MEDICINE

## 2023-07-14 PROCEDURE — 1036F TOBACCO NON-USER: CPT | Performed by: INTERNAL MEDICINE

## 2023-07-14 PROCEDURE — 1123F ACP DISCUSS/DSCN MKR DOCD: CPT | Performed by: INTERNAL MEDICINE

## 2023-07-14 PROCEDURE — 3017F COLORECTAL CA SCREEN DOC REV: CPT | Performed by: INTERNAL MEDICINE

## 2023-07-14 RX ORDER — CIPROFLOXACIN 500 MG/1
500 TABLET, FILM COATED ORAL 2 TIMES DAILY
Qty: 14 TABLET | Refills: 0 | Status: SHIPPED | OUTPATIENT
Start: 2023-07-14 | End: 2023-07-21

## 2023-07-14 RX ORDER — ALBUTEROL SULFATE 90 UG/1
AEROSOL, METERED RESPIRATORY (INHALATION)
COMMUNITY
Start: 2023-06-08

## 2023-07-14 ASSESSMENT — PATIENT HEALTH QUESTIONNAIRE - PHQ9
2. FEELING DOWN, DEPRESSED OR HOPELESS: 0
SUM OF ALL RESPONSES TO PHQ QUESTIONS 1-9: 0
SUM OF ALL RESPONSES TO PHQ9 QUESTIONS 1 & 2: 0
SUM OF ALL RESPONSES TO PHQ QUESTIONS 1-9: 0
SUM OF ALL RESPONSES TO PHQ QUESTIONS 1-9: 0
1. LITTLE INTEREST OR PLEASURE IN DOING THINGS: 0
SUM OF ALL RESPONSES TO PHQ QUESTIONS 1-9: 0

## 2023-07-14 NOTE — PROGRESS NOTES
MD Mata, FACP, [place], [place]      MONO Myers, [facility]-BC   Lizette Youssef, [facility]-AG   Jose L Lloyd, [facility]-C  Bebeto Serrano FNP-C   Sukh Dexter, PCNP-BC   Jeannie Whiting, FNP-BC      [address]   at [facility]   [address] [facility], [address] [facility], [facility]   [phone]   FAX: [address]   at [facility], [address] [facility], [address]   [phone]   FAX: [phone]       Patient Care Team:  VASYL Cruz NP as PCP - General (Nurse Practitioner)  VASYL Cruz NP as PCP - Empaneled Provider  Ebony Alexander MD (Neurology)  VASYL Saez NP (Nurse Practitioner)      Patient Active Problem List   Diagnosis    Seizure [facility])    Hypokalemia    Leukocytosis    Rib fracture    Hyperglycemia    Hypercalcemia    Chronic hepatitis C [facility])       The clinicians listed above have asked me to see Rashmi Noguera in consultation regarding chronic HCV and its management. All medical records sent by the referring physicians were reviewed including imaging studies     The patient is a 68 y.o. male who was found to have abnormalities in liver chemistries and subsequently tested positive for chronic HCV in the 1990s    Risk factors for acquiring HCV are not apparent     The most recent imaging of the liver was CT performed in 1/2018. Results suggest that the liver is normal.      An assessment of liver fibrosis with biopsy or elastography has not been performed. The patient was treated with standard interferon [id number] and then with peginterferon in the early 2000s. The patient did not achieve SVR.     In the office today the patient has the following symptoms:  fatigue,   Dizziness,     The patient is not experiencing

## 2023-07-15 LAB
A1AT SERPL-MCNC: 167 MG/DL (ref 101–187)
ALBUMIN SERPL-MCNC: 4.7 G/DL (ref 3.8–4.8)
ALP SERPL-CCNC: 76 IU/L (ref 44–121)
ALT SERPL-CCNC: 55 IU/L (ref 0–44)
AST SERPL-CCNC: 28 IU/L (ref 0–40)
BASOPHILS # BLD AUTO: 0 X10E3/UL (ref 0–0.2)
BASOPHILS NFR BLD AUTO: 1 %
BILIRUB DIRECT SERPL-MCNC: <0.1 MG/DL (ref 0–0.4)
BILIRUB SERPL-MCNC: 0.2 MG/DL (ref 0–1.2)
BUN SERPL-MCNC: 11 MG/DL (ref 8–27)
BUN/CREAT SERPL: 13 (ref 10–24)
CALCIUM SERPL-MCNC: 11 MG/DL (ref 8.6–10.2)
CERULOPLASMIN SERPL-MCNC: 28.3 MG/DL (ref 16–31)
CHLORIDE SERPL-SCNC: 103 MMOL/L (ref 96–106)
CO2 SERPL-SCNC: 26 MMOL/L (ref 20–29)
CREAT SERPL-MCNC: 0.88 MG/DL (ref 0.76–1.27)
EGFRCR SERPLBLD CKD-EPI 2021: 91 ML/MIN/1.73
EOSINOPHIL # BLD AUTO: 0.2 X10E3/UL (ref 0–0.4)
EOSINOPHIL NFR BLD AUTO: 3 %
ERYTHROCYTE [DISTWIDTH] IN BLOOD BY AUTOMATED COUNT: 12.5 % (ref 11.6–15.4)
GLUCOSE SERPL-MCNC: 77 MG/DL (ref 70–99)
HCT VFR BLD AUTO: 44.1 % (ref 37.5–51)
HGB BLD-MCNC: 14.7 G/DL (ref 13–17.7)
IMM GRANULOCYTES # BLD AUTO: 0 X10E3/UL (ref 0–0.1)
IMM GRANULOCYTES NFR BLD AUTO: 0 %
LYMPHOCYTES # BLD AUTO: 1.7 X10E3/UL (ref 0.7–3.1)
LYMPHOCYTES NFR BLD AUTO: 29 %
MCH RBC QN AUTO: 31.5 PG (ref 26.6–33)
MCHC RBC AUTO-ENTMCNC: 33.3 G/DL (ref 31.5–35.7)
MCV RBC AUTO: 94 FL (ref 79–97)
MONOCYTES # BLD AUTO: 0.7 X10E3/UL (ref 0.1–0.9)
MONOCYTES NFR BLD AUTO: 12 %
NEUTROPHILS # BLD AUTO: 3.2 X10E3/UL (ref 1.4–7)
NEUTROPHILS NFR BLD AUTO: 55 %
PLATELET # BLD AUTO: 166 X10E3/UL (ref 150–450)
POTASSIUM SERPL-SCNC: 4.2 MMOL/L (ref 3.5–5.2)
PROT SERPL-MCNC: 7.2 G/DL (ref 6–8.5)
RBC # BLD AUTO: 4.67 X10E6/UL (ref 4.14–5.8)
SODIUM SERPL-SCNC: 143 MMOL/L (ref 134–144)
WBC # BLD AUTO: 5.8 X10E3/UL (ref 3.4–10.8)

## 2023-07-16 LAB — SMA IGG SER-ACNC: 5 UNITS (ref 0–19)

## 2023-07-17 ENCOUNTER — TELEPHONE (OUTPATIENT)
Age: 73
End: 2023-07-17

## 2023-07-17 LAB
HCV GENTYP SERPL NAA+PROBE: NORMAL
HCV GENTYP SERPL NAA+PROBE: NORMAL
HCV RNA SERPL NAA+PROBE-ACNC: NORMAL IU/ML
HCV RNA SERPL NAA+PROBE-LOG IU: 4.12 LOG10 IU/ML
LABORATORY COMMENT REPORT: NORMAL
LABORATORY COMMENT REPORT: NORMAL

## 2023-07-18 LAB — ANA SER QL IF: NEGATIVE

## 2023-08-01 ENCOUNTER — OFFICE VISIT (OUTPATIENT)
Age: 73
End: 2023-08-01
Payer: MEDICARE

## 2023-08-01 VITALS
DIASTOLIC BLOOD PRESSURE: 80 MMHG | HEART RATE: 64 BPM | TEMPERATURE: 97.6 F | OXYGEN SATURATION: 97 % | WEIGHT: 138 LBS | RESPIRATION RATE: 18 BRPM | SYSTOLIC BLOOD PRESSURE: 130 MMHG | BODY MASS INDEX: 20.44 KG/M2 | HEIGHT: 69 IN

## 2023-08-01 DIAGNOSIS — F07.81 POSTCONCUSSION SYNDROME: ICD-10-CM

## 2023-08-01 DIAGNOSIS — G44.86 CERVICOGENIC HEADACHE: ICD-10-CM

## 2023-08-01 DIAGNOSIS — R41.3 MEMORY LOSS: ICD-10-CM

## 2023-08-01 DIAGNOSIS — R56.9 SEIZURE (HCC): Primary | ICD-10-CM

## 2023-08-01 PROCEDURE — 1123F ACP DISCUSS/DSCN MKR DOCD: CPT | Performed by: PSYCHIATRY & NEUROLOGY

## 2023-08-01 PROCEDURE — 3017F COLORECTAL CA SCREEN DOC REV: CPT | Performed by: PSYCHIATRY & NEUROLOGY

## 2023-08-01 PROCEDURE — G8420 CALC BMI NORM PARAMETERS: HCPCS | Performed by: PSYCHIATRY & NEUROLOGY

## 2023-08-01 PROCEDURE — G8427 DOCREV CUR MEDS BY ELIG CLIN: HCPCS | Performed by: PSYCHIATRY & NEUROLOGY

## 2023-08-01 PROCEDURE — 99215 OFFICE O/P EST HI 40 MIN: CPT | Performed by: PSYCHIATRY & NEUROLOGY

## 2023-08-01 PROCEDURE — 1036F TOBACCO NON-USER: CPT | Performed by: PSYCHIATRY & NEUROLOGY

## 2023-08-01 RX ORDER — AMITRIPTYLINE HYDROCHLORIDE 10 MG/1
TABLET, FILM COATED ORAL
Qty: 90 TABLET | Refills: 3 | Status: SHIPPED | OUTPATIENT
Start: 2023-08-01

## 2023-08-01 ASSESSMENT — PATIENT HEALTH QUESTIONNAIRE - PHQ9
SUM OF ALL RESPONSES TO PHQ QUESTIONS 1-9: 0
2. FEELING DOWN, DEPRESSED OR HOPELESS: 0
SUM OF ALL RESPONSES TO PHQ QUESTIONS 1-9: 0
SUM OF ALL RESPONSES TO PHQ9 QUESTIONS 1 & 2: 0
1. LITTLE INTEREST OR PLEASURE IN DOING THINGS: 0

## 2023-08-01 NOTE — PROGRESS NOTES
NEUROLOGY CLINIC NOTE    Patient ID:  Alex Guillaume  [id number]  55 y.o.  1950    Date of Consultation:  August 1, 2023    Reason for Consultation:  hospital follow up    Chief Complaint   Patient presents with    Follow-Up from Hospital     4 month follow up for seizure. Patient denies any seizures since 3/29/2023. History of Present Illness:     Patient Active Problem List    Diagnosis Date Noted    Chronic hepatitis C ([address]) 07/14/2023    Seizure ([address]) 03/29/2023    Hypokalemia 03/29/2023    Leukocytosis 03/29/2023    Rib fracture 03/29/2023    Hyperglycemia 03/29/2023    Hypercalcemia 03/29/2023     Past Medical History:   Diagnosis Date    Diverticulitis 2018    Headache     Often    Hearing loss 2019    worse every day    Hepatitis C 1992    Hypertension     Pancreatitis 2016    Seizures ([address]) 3/31/2023    1 as a result of a concussion    Sleep apnea 1980      Past Surgical History:   Procedure Laterality Date    COLONOSCOPY N/A 2/23/2018    COLONOSCOPY performed by Vero Campbell MD at [facility] N/A 5/10/2023    COLONOSCOPY performed by Gisela Arteaga MD at [address] [place] [address]      Prior to Admission medications    Medication Sig Start Date End Date Taking?  Authorizing Provider   albuterol sulfate HFA (PROVENTIL;VENTOLIN;PROAIR) 108 (90 Base) MCG/ACT inhaler INHALE 2 PUFFS BY MOUTH EVERY 4 HOURS AS NEEDED FOR COUGH,WHEEZING OR SHORTNESS OF BREATH 6/8/23  Yes Historical Provider, MD   Multiple Vitamins-Minerals (MULTIVIT/MULTIMINERAL ADULT PO) Take by mouth   Yes Historical Provider, MD   B Complex Vitamins (VITAMIN B COMPLEX PO) Take 1 tablet by mouth daily   Yes Historical Provider, MD   ascorbic acid (VITAMIN C) 500 MG tablet Take 1 tablet by mouth daily   Yes Historical Provider, MD   vitamin D (CHOLECALCIFEROL) 25 MCG (1000 UT) TABS tablet Take 1 tablet by mouth daily   Yes Historical Provider, MD   clonazePAM (KLONOPIN) 1 MG tablet TAKE 1 TABLET BY MOUTH EVERY

## 2023-08-14 ENCOUNTER — OFFICE VISIT (OUTPATIENT)
Age: 73
End: 2023-08-14
Payer: MEDICARE

## 2023-08-14 VITALS
HEART RATE: 65 BPM | RESPIRATION RATE: 17 BRPM | HEIGHT: 69 IN | TEMPERATURE: 97.8 F | DIASTOLIC BLOOD PRESSURE: 77 MMHG | BODY MASS INDEX: 21.48 KG/M2 | SYSTOLIC BLOOD PRESSURE: 110 MMHG | WEIGHT: 145 LBS | OXYGEN SATURATION: 98 %

## 2023-08-14 DIAGNOSIS — B18.2 CHRONIC HEPATITIS C WITHOUT HEPATIC COMA (HCC): Primary | ICD-10-CM

## 2023-08-14 PROCEDURE — G8427 DOCREV CUR MEDS BY ELIG CLIN: HCPCS | Performed by: NURSE PRACTITIONER

## 2023-08-14 PROCEDURE — G8420 CALC BMI NORM PARAMETERS: HCPCS | Performed by: NURSE PRACTITIONER

## 2023-08-14 PROCEDURE — 1123F ACP DISCUSS/DSCN MKR DOCD: CPT | Performed by: NURSE PRACTITIONER

## 2023-08-14 PROCEDURE — 99214 OFFICE O/P EST MOD 30 MIN: CPT | Performed by: NURSE PRACTITIONER

## 2023-08-14 PROCEDURE — 1036F TOBACCO NON-USER: CPT | Performed by: NURSE PRACTITIONER

## 2023-08-14 PROCEDURE — 91200 LIVER ELASTOGRAPHY: CPT | Performed by: NURSE PRACTITIONER

## 2023-08-14 PROCEDURE — 3017F COLORECTAL CA SCREEN DOC REV: CPT | Performed by: NURSE PRACTITIONER

## 2023-08-14 RX ORDER — VELPATASVIR AND SOFOSBUVIR 100; 400 MG/1; MG/1
1 TABLET, FILM COATED ORAL DAILY
Qty: 28 TABLET | Refills: 2 | Status: ACTIVE | OUTPATIENT
Start: 2023-08-14

## 2023-08-14 RX ORDER — ALPRAZOLAM 1 MG/1
1 TABLET ORAL 2 TIMES DAILY PRN
COMMUNITY
Start: 2023-08-10

## 2023-08-14 RX ORDER — GLECAPREVIR AND PIBRENTASVIR 40; 100 MG/1; MG/1
3 TABLET, FILM COATED ORAL DAILY
Qty: 84 TABLET | Refills: 1 | Status: SHIPPED | OUTPATIENT
Start: 2023-08-14 | End: 2023-08-14

## 2023-08-14 RX ORDER — TESTOSTERONE CYPIONATE 200 MG/ML
INJECTION, SOLUTION INTRAMUSCULAR
COMMUNITY
Start: 2023-08-10

## 2023-08-14 NOTE — PROGRESS NOTES
Identified pt with two pt identifiers(name and ). Reviewed record in preparation for visit and have obtained necessary documentation. There were no vitals filed for this visit. Health Maintenance Review: Patient reminded of \"due or due soon\" health maintenance. I have asked the patient to contact his/her primary care provider (PCP) for follow-up on his/her health maintenance. Coordination of Care Questionnaire:  :   1) Have you been to an emergency room, urgent care, or hospitalized since your last visit? If yes, where when, and reason for visit? no       2. Have seen or consulted any other health care provider since your last visit? If yes, where when, and reason for visit? Yes, Neurology f/u      Patient is accompanied by self I have received verbal consent from Ramakrishna Peterson to discuss any/all medical information while they are present in the room.

## 2023-08-14 NOTE — PROGRESS NOTES
MD Mata, FACP, [place], [place]      MONO Ramírez, [facility]-BC   Ronal Grant, [facility]-   Bianca Aponte FNP-MARCELO Farias FNP-MARCELO Davalos, [facility]   [facility]      [address]   at [facility]   [address] [facility], [address] [facility], [facility]   [phone]   FAX: [phone]  Liver [place] [facility]   at [facility], [address] [facility], [address]   [phone]   FAX: [phone]       Patient Care Team:  VASYL Youngblood NP as PCP - General (Nurse Practitioner)  VASYL Youngblood NP as PCP - Empaneled Provider  Kathy German MD (Neurology)  VASYL Nur NP (Nurse Practitioner)      Patient Active Problem List   Diagnosis    Seizure [facility])    Hypokalemia    Leukocytosis    Rib fracture    Hyperglycemia    Hypercalcemia    Chronic hepatitis C ([address] )     [facility] for management of   chronic HCV. The active problem list, all pertinent past medical history, medications,   liver histology, radiologic findings and laboratory findings related to the liver disorder were reviewed and discussed with the patient. The patient is a 68 y.o. male who was found to have abnormalities in liver chemistries and subsequently tested positive for chronic HCV in the 1990s. Risk factors for acquiring HCV are not apparent. The most recent imaging of the liver was CT performed in 1/2018. Results suggest that the liver is normal.      The patient was treated with standard interferon [id number] and then with peginterferon in the early 2000s. The patient did not achieve SVR. In the office today the patient has the following symptoms:  The patient feels well and has no complaints.      The

## 2023-10-23 ENCOUNTER — HOSPITAL ENCOUNTER (OUTPATIENT)
Facility: HOSPITAL | Age: 73
Discharge: HOME | End: 2023-10-26
Attending: SPECIALIST
Payer: MEDICARE

## 2023-10-23 DIAGNOSIS — R22.1 NECK MASS: ICD-10-CM

## 2023-10-23 PROCEDURE — 70491 CT SOFT TISSUE NECK W/DYE: CPT

## 2023-10-23 PROCEDURE — 82565 ASSAY OF CREATININE: CPT

## 2023-10-23 PROCEDURE — 6360000004 HC RX CONTRAST MEDICATION: Performed by: SPECIALIST

## 2023-10-24 LAB — CREAT BLD-MCNC: 1 MG/DL (ref 0.6–1.3)

## 2023-10-24 PROCEDURE — 6360000004 HC RX CONTRAST MEDICATION: Performed by: SPECIALIST

## 2023-10-24 RX ADMIN — IOPAMIDOL 100 ML: 755 INJECTION, SOLUTION INTRAVENOUS at 07:09

## 2023-11-15 ENCOUNTER — OFFICE VISIT (OUTPATIENT)
Age: 73
End: 2023-11-15
Payer: MEDICARE

## 2023-11-15 VITALS
OXYGEN SATURATION: 97 % | TEMPERATURE: 97.9 F | HEART RATE: 66 BPM | WEIGHT: 148.8 LBS | RESPIRATION RATE: 16 BRPM | SYSTOLIC BLOOD PRESSURE: 118 MMHG | DIASTOLIC BLOOD PRESSURE: 70 MMHG | HEIGHT: 69 IN | BODY MASS INDEX: 22.04 KG/M2

## 2023-11-15 DIAGNOSIS — B18.2 CHRONIC HEPATITIS C WITHOUT HEPATIC COMA (HCC): Primary | ICD-10-CM

## 2023-11-15 PROCEDURE — 3017F COLORECTAL CA SCREEN DOC REV: CPT | Performed by: NURSE PRACTITIONER

## 2023-11-15 PROCEDURE — 99214 OFFICE O/P EST MOD 30 MIN: CPT | Performed by: NURSE PRACTITIONER

## 2023-11-15 PROCEDURE — 1036F TOBACCO NON-USER: CPT | Performed by: NURSE PRACTITIONER

## 2023-11-15 PROCEDURE — G8484 FLU IMMUNIZE NO ADMIN: HCPCS | Performed by: NURSE PRACTITIONER

## 2023-11-15 PROCEDURE — G8420 CALC BMI NORM PARAMETERS: HCPCS | Performed by: NURSE PRACTITIONER

## 2023-11-15 PROCEDURE — 1123F ACP DISCUSS/DSCN MKR DOCD: CPT | Performed by: NURSE PRACTITIONER

## 2023-11-15 PROCEDURE — G8427 DOCREV CUR MEDS BY ELIG CLIN: HCPCS | Performed by: NURSE PRACTITIONER

## 2023-11-15 RX ORDER — AMITRIPTYLINE HYDROCHLORIDE 10 MG/1
TABLET, FILM COATED ORAL
COMMUNITY
Start: 2023-10-30 | End: 2023-11-15

## 2023-11-15 RX ORDER — AMOXICILLIN 875 MG/1
875 TABLET, COATED ORAL EVERY 12 HOURS
COMMUNITY
Start: 2023-10-05 | End: 2023-11-15

## 2023-11-15 ASSESSMENT — PATIENT HEALTH QUESTIONNAIRE - PHQ9
SUM OF ALL RESPONSES TO PHQ9 QUESTIONS 1 & 2: 0
SUM OF ALL RESPONSES TO PHQ QUESTIONS 1-9: 0
2. FEELING DOWN, DEPRESSED OR HOPELESS: 0
1. LITTLE INTEREST OR PLEASURE IN DOING THINGS: 0
SUM OF ALL RESPONSES TO PHQ QUESTIONS 1-9: 0

## 2023-11-15 NOTE — PROGRESS NOTES
MD Mata, FACP, [place], [place]      Rukhsana Ramirez, MONO Warner, [facility]-BC   Joana Blackwell, [facility]   Robyn Pena, FNMAKI-MARCELO Conrad, FNMAKI-MARCELO Kaiser, [facility]-BC   Beatrizzaire Munoz, [facility]      [address] [address], [address]   at [facility]   [address] [facility], [address] [facility], [employer]   [phone]   FAX: [phone]  Liver [place] [facility]   at [facility], [address] [place] [address], [address]   [phone]   FAX: [phone]       Patient Care Team:  VASYL Fan NP as PCP - General (Nurse Practitioner)  Lucero Dougherty MD (Neurology)  VASYL Meeks NP (Nurse Practitioner)      Patient Active Problem List   Diagnosis    Seizure [facility])    Hypokalemia    Leukocytosis    Rib fracture    Hyperglycemia    Hypercalcemia    Chronic hepatitis C ([address] [facility])     Malcolm Jose is being [facility] at [facility] for management of   chronic HCV. The active problem list, all pertinent past medical history, medications,   liver histology, radiologic findings and laboratory findings related to the liver disorder were reviewed and discussed with the patient. The patient is a 68 y.o. male who was found to have abnormalities in liver chemistries and subsequently tested positive for chronic HCV in the 1990s. Risk factors for acquiring HCV are not apparent. The most recent imaging of the liver was CT performed in 1/2018. Results suggest that the liver is normal.      The patient was treated with standard interferon [id number] and then with peginterferon in the early 2000s. The patient did not achieve SVR. He has completed the majority of a 12 week course of Epclusa. In the office today the patient has the following symptoms:  The patient feels well and has no complaints.

## 2023-11-16 LAB
ALBUMIN SERPL-MCNC: 4 G/DL (ref 3.5–5)
ALBUMIN/GLOB SERPL: 1.3 (ref 1.1–2.2)
ALP SERPL-CCNC: 68 U/L (ref 45–117)
ALT SERPL-CCNC: 24 U/L (ref 12–78)
ANION GAP SERPL CALC-SCNC: 3 MMOL/L (ref 5–15)
AST SERPL-CCNC: 19 U/L (ref 15–37)
BASOPHILS # BLD: 0 K/UL (ref 0–0.1)
BASOPHILS NFR BLD: 0 % (ref 0–1)
BILIRUB SERPL-MCNC: 0.3 MG/DL (ref 0.2–1)
BUN SERPL-MCNC: 11 MG/DL (ref 6–20)
BUN/CREAT SERPL: 11 (ref 12–20)
CALCIUM SERPL-MCNC: 10.3 MG/DL (ref 8.5–10.1)
CHLORIDE SERPL-SCNC: 107 MMOL/L (ref 97–108)
CO2 SERPL-SCNC: 31 MMOL/L (ref 21–32)
CREAT SERPL-MCNC: 0.96 MG/DL (ref 0.7–1.3)
DIFFERENTIAL METHOD BLD: ABNORMAL
EOSINOPHIL # BLD: 0.1 K/UL (ref 0–0.4)
EOSINOPHIL NFR BLD: 1 % (ref 0–7)
ERYTHROCYTE [DISTWIDTH] IN BLOOD BY AUTOMATED COUNT: 12.6 % (ref 11.5–14.5)
GLOBULIN SER CALC-MCNC: 3 G/DL (ref 2–4)
GLUCOSE SERPL-MCNC: 99 MG/DL (ref 65–100)
HCT VFR BLD AUTO: 46.8 % (ref 36.6–50.3)
HGB BLD-MCNC: 15.5 G/DL (ref 12.1–17)
IMM GRANULOCYTES # BLD AUTO: 0 K/UL (ref 0–0.04)
IMM GRANULOCYTES NFR BLD AUTO: 0 % (ref 0–0.5)
LYMPHOCYTES # BLD: 1.6 K/UL (ref 0.8–3.5)
LYMPHOCYTES NFR BLD: 16 % (ref 12–49)
MCH RBC QN AUTO: 30.6 PG (ref 26–34)
MCHC RBC AUTO-ENTMCNC: 33.1 G/DL (ref 30–36.5)
MCV RBC AUTO: 92.3 FL (ref 80–99)
MONOCYTES # BLD: 0.7 K/UL (ref 0–1)
MONOCYTES NFR BLD: 7 % (ref 5–13)
NEUTS SEG # BLD: 7.8 K/UL (ref 1.8–8)
NEUTS SEG NFR BLD: 76 % (ref 32–75)
NRBC # BLD: 0 K/UL (ref 0–0.01)
NRBC BLD-RTO: 0 PER 100 WBC
PLATELET # BLD AUTO: 168 K/UL (ref 150–400)
PMV BLD AUTO: 12.1 FL (ref 8.9–12.9)
POTASSIUM SERPL-SCNC: 4.1 MMOL/L (ref 3.5–5.1)
PROT SERPL-MCNC: 7 G/DL (ref 6.4–8.2)
RBC # BLD AUTO: 5.07 M/UL (ref 4.1–5.7)
SODIUM SERPL-SCNC: 141 MMOL/L (ref 136–145)
WBC # BLD AUTO: 10.1 K/UL (ref 4.1–11.1)

## 2023-11-17 LAB
HCV GENTYP SERPL NAA+PROBE: NORMAL
HCV RNA SERPL NAA+PROBE-ACNC: NORMAL IU/ML
HCV RNA SERPL NAA+PROBE-LOG IU: NORMAL LOG10 IU/ML
LABORATORY COMMENT REPORT: NORMAL

## 2024-02-19 ENCOUNTER — OFFICE VISIT (OUTPATIENT)
Age: 74
End: 2024-02-19
Payer: MEDICARE

## 2024-02-19 VITALS
SYSTOLIC BLOOD PRESSURE: 128 MMHG | WEIGHT: 149.4 LBS | RESPIRATION RATE: 17 BRPM | TEMPERATURE: 97.4 F | HEIGHT: 69 IN | BODY MASS INDEX: 22.13 KG/M2 | DIASTOLIC BLOOD PRESSURE: 82 MMHG | HEART RATE: 70 BPM | OXYGEN SATURATION: 98 %

## 2024-02-19 DIAGNOSIS — Z86.19 HEPATITIS C VIRUS INFECTION CURED AFTER ANTIVIRAL DRUG THERAPY: ICD-10-CM

## 2024-02-19 DIAGNOSIS — B18.2 CHRONIC HEPATITIS C WITHOUT HEPATIC COMA (HCC): Primary | ICD-10-CM

## 2024-02-19 PROCEDURE — G8420 CALC BMI NORM PARAMETERS: HCPCS | Performed by: NURSE PRACTITIONER

## 2024-02-19 PROCEDURE — 1123F ACP DISCUSS/DSCN MKR DOCD: CPT | Performed by: NURSE PRACTITIONER

## 2024-02-19 PROCEDURE — G8484 FLU IMMUNIZE NO ADMIN: HCPCS | Performed by: NURSE PRACTITIONER

## 2024-02-19 PROCEDURE — 99214 OFFICE O/P EST MOD 30 MIN: CPT | Performed by: NURSE PRACTITIONER

## 2024-02-19 PROCEDURE — 91200 LIVER ELASTOGRAPHY: CPT | Performed by: NURSE PRACTITIONER

## 2024-02-19 PROCEDURE — 1036F TOBACCO NON-USER: CPT | Performed by: NURSE PRACTITIONER

## 2024-02-19 PROCEDURE — 3017F COLORECTAL CA SCREEN DOC REV: CPT | Performed by: NURSE PRACTITIONER

## 2024-02-19 PROCEDURE — G8427 DOCREV CUR MEDS BY ELIG CLIN: HCPCS | Performed by: NURSE PRACTITIONER

## 2024-02-19 RX ORDER — AMLODIPINE BESYLATE 10 MG/1
10 TABLET ORAL DAILY
COMMUNITY
Start: 2024-01-11

## 2024-02-19 RX ORDER — PHYTONADIONE 5 MG/1
5 TABLET ORAL ONCE
COMMUNITY

## 2024-02-19 ASSESSMENT — PATIENT HEALTH QUESTIONNAIRE - PHQ9
2. FEELING DOWN, DEPRESSED OR HOPELESS: 0
SUM OF ALL RESPONSES TO PHQ QUESTIONS 1-9: 0
1. LITTLE INTEREST OR PLEASURE IN DOING THINGS: 0
SUM OF ALL RESPONSES TO PHQ QUESTIONS 1-9: 0
SUM OF ALL RESPONSES TO PHQ9 QUESTIONS 1 & 2: 0
SUM OF ALL RESPONSES TO PHQ QUESTIONS 1-9: 0
SUM OF ALL RESPONSES TO PHQ QUESTIONS 1-9: 0

## 2024-02-19 NOTE — PROGRESS NOTES
[facility]      José Patricia MD, FACP, FACG, FAASLD      FRANCISCO Sanchez-MARCELO Warner, [facility]   Vanesa Carreon, [facility]   Jennifer Gage, [facility]  Neto Katz, [facility]   Mae Valladares, [facility]   Denise Hensley, [facility]   [address], [address]   [place]  [address] [phone]   FAX: [phone]  [facility]   [address], [address]   [place]  [address] [phone]   FAX: [phone]       Patient Care Team:  Fatoumata Marques APRN - NP as PCP - General (Nurse Practitioner)  Gregorio Griffin Jr., MD (Neurology)  Taiwo Peña APRN - NP (Nurse Practitioner)      Patient Active Problem List   Diagnosis    Seizure (HCC)    Hypokalemia    Leukocytosis    Rib fracture    Hyperglycemia    Hypercalcemia    Hepatitis C virus infection cured after antiviral drug therapy     Lev Faust is being [facility] at [facility] for management of chronic HCV.The active problem list, all pertinent past medical history, medications, liver histology, radiologic findings and laboratory findings related to the liver disorder were reviewed and discussed with the patient.      The patient is a 73 y.o. male who was found to have abnormalities in liver chemistries and subsequently tested positive for chronic HCV in the 1990s.    Risk factors for acquiring HCV are not apparent.     The most recent imaging of the liver was CT performed in 1/2018.  Results suggest that the liver is normal.      The patient was treated with standard interferon in the 1990's and then with peginterferon in the early 2000's.  The patient did not achieve SVR.    The patient has completed the a 12 week course of Epclusa (sofosbuvir-velpatasvir). Viral load was undetectable in 11/2024.     In

## 2024-02-19 NOTE — PROGRESS NOTES
Identified pt with two pt identifiers(name and ). Reviewed record in preparation for visit and have obtained necessary documentation.  Vitals:    24 1446   BP: 128/82   Site: Left Upper Arm   Position: Sitting   Cuff Size: Medium Adult   Pulse: 70   Resp: 17   Temp: 97.4 °F (36.3 °C)   TempSrc: Temporal   SpO2: 98%   Weight: 67.8 kg (149 lb 6.4 oz)   Height: 1.753 m (5' 9\")        Health Maintenance Review: Patient reminded of \"due or due soon\" health maintenance. I have asked the patient to contact his/her primary care provider (PCP) for follow-up on his/her health maintenance.    Coordination of Care Questionnaire:  :   1) Have you been to an emergency room, urgent care, or hospitalized since your last visit?  If yes, where when, and reason for visit? no       2. Have seen or consulted any other health care provider since your last visit?   If yes, where when, and reason for visit?  No      Patient is accompanied by self I have received verbal consent from Lev Faust to discuss any/all medical information while they are present in the room.

## 2024-02-20 PROBLEM — B18.2 CHRONIC HEPATITIS C (HCC): Status: RESOLVED | Noted: 2023-07-14 | Resolved: 2024-02-20

## 2024-02-20 PROBLEM — Z86.19 HEPATITIS C VIRUS INFECTION CURED AFTER ANTIVIRAL DRUG THERAPY: Status: ACTIVE | Noted: 2024-02-20

## 2024-02-20 LAB
ALBUMIN SERPL-MCNC: 4 G/DL (ref 3.5–5)
ALBUMIN/GLOB SERPL: 1.3 (ref 1.1–2.2)
ALP SERPL-CCNC: 76 U/L (ref 45–117)
ALT SERPL-CCNC: 23 U/L (ref 12–78)
ANION GAP SERPL CALC-SCNC: 5 MMOL/L (ref 5–15)
AST SERPL-CCNC: 19 U/L (ref 15–37)
BASOPHILS # BLD: 0.1 K/UL (ref 0–0.1)
BASOPHILS NFR BLD: 1 % (ref 0–1)
BILIRUB DIRECT SERPL-MCNC: 0.1 MG/DL (ref 0–0.2)
BILIRUB SERPL-MCNC: 0.3 MG/DL (ref 0.2–1)
BUN SERPL-MCNC: 8 MG/DL (ref 6–20)
BUN/CREAT SERPL: 9 (ref 12–20)
CALCIUM SERPL-MCNC: 10.8 MG/DL (ref 8.5–10.1)
CHLORIDE SERPL-SCNC: 109 MMOL/L (ref 97–108)
CO2 SERPL-SCNC: 28 MMOL/L (ref 21–32)
CREAT SERPL-MCNC: 0.9 MG/DL (ref 0.7–1.3)
DIFFERENTIAL METHOD BLD: NORMAL
EOSINOPHIL # BLD: 0.1 K/UL (ref 0–0.4)
EOSINOPHIL NFR BLD: 1 % (ref 0–7)
ERYTHROCYTE [DISTWIDTH] IN BLOOD BY AUTOMATED COUNT: 13.2 % (ref 11.5–14.5)
GLOBULIN SER CALC-MCNC: 3.1 G/DL (ref 2–4)
GLUCOSE SERPL-MCNC: 114 MG/DL (ref 65–100)
HCT VFR BLD AUTO: 46.4 % (ref 36.6–50.3)
HGB BLD-MCNC: 15.1 G/DL (ref 12.1–17)
IMM GRANULOCYTES # BLD AUTO: 0 K/UL (ref 0–0.04)
IMM GRANULOCYTES NFR BLD AUTO: 0 % (ref 0–0.5)
LYMPHOCYTES # BLD: 1.7 K/UL (ref 0.8–3.5)
LYMPHOCYTES NFR BLD: 24 % (ref 12–49)
MCH RBC QN AUTO: 31.1 PG (ref 26–34)
MCHC RBC AUTO-ENTMCNC: 32.5 G/DL (ref 30–36.5)
MCV RBC AUTO: 95.5 FL (ref 80–99)
MONOCYTES # BLD: 0.9 K/UL (ref 0–1)
MONOCYTES NFR BLD: 13 % (ref 5–13)
NEUTS SEG # BLD: 4.2 K/UL (ref 1.8–8)
NEUTS SEG NFR BLD: 61 % (ref 32–75)
NRBC # BLD: 0 K/UL (ref 0–0.01)
NRBC BLD-RTO: 0 PER 100 WBC
PLATELET # BLD AUTO: 176 K/UL (ref 150–400)
PMV BLD AUTO: 12.7 FL (ref 8.9–12.9)
POTASSIUM SERPL-SCNC: 4.3 MMOL/L (ref 3.5–5.1)
PROT SERPL-MCNC: 7.1 G/DL (ref 6.4–8.2)
RBC # BLD AUTO: 4.86 M/UL (ref 4.1–5.7)
SODIUM SERPL-SCNC: 142 MMOL/L (ref 136–145)
WBC # BLD AUTO: 7.1 K/UL (ref 4.1–11.1)

## 2024-03-29 ENCOUNTER — HOSPITAL ENCOUNTER (INPATIENT)
Facility: HOSPITAL | Age: 74
LOS: 2 days | Discharge: HOME | DRG: 897 | End: 2024-03-31
Attending: EMERGENCY MEDICINE | Admitting: INTERNAL MEDICINE
Payer: MEDICARE

## 2024-03-29 ENCOUNTER — APPOINTMENT (OUTPATIENT)
Facility: HOSPITAL | Age: 74
DRG: 897 | End: 2024-03-29
Payer: MEDICARE

## 2024-03-29 DIAGNOSIS — R56.9 SEIZURE (HCC): ICD-10-CM

## 2024-03-29 DIAGNOSIS — Z86.73 HISTORY OF STROKE: Chronic | ICD-10-CM

## 2024-03-29 PROBLEM — R79.89 ELEVATED LACTIC ACID LEVEL: Status: ACTIVE | Noted: 2024-03-29

## 2024-03-29 LAB
ALBUMIN SERPL-MCNC: 3.9 G/DL (ref 3.5–5)
ALBUMIN/GLOB SERPL: 1 (ref 1.1–2.2)
ALP SERPL-CCNC: 63 U/L (ref 45–117)
ALT SERPL-CCNC: 23 U/L (ref 12–78)
ALT SERPL-CCNC: ABNORMAL U/L (ref 12–78)
ANION GAP SERPL CALC-SCNC: 2 MMOL/L (ref 5–15)
APPEARANCE UR: CLEAR
AST SERPL-CCNC: 19 U/L (ref 15–37)
AST SERPL-CCNC: ABNORMAL U/L (ref 15–37)
BACTERIA URNS QL MICRO: NEGATIVE /HPF
BASOPHILS # BLD: 0 K/UL (ref 0–0.1)
BASOPHILS NFR BLD: 0 % (ref 0–1)
BILIRUB SERPL-MCNC: 0.4 MG/DL (ref 0.2–1)
BILIRUB UR QL: NEGATIVE
BUN SERPL-MCNC: 10 MG/DL (ref 6–20)
BUN/CREAT SERPL: 10 (ref 12–20)
CALCIUM SERPL-MCNC: 10.2 MG/DL (ref 8.5–10.1)
CHLORIDE SERPL-SCNC: 104 MMOL/L (ref 97–108)
CO2 SERPL-SCNC: 26 MMOL/L (ref 21–32)
COLOR UR: ABNORMAL
COMMENT:: NORMAL
CREAT SERPL-MCNC: 0.99 MG/DL (ref 0.7–1.3)
DIFFERENTIAL METHOD BLD: ABNORMAL
EOSINOPHIL # BLD: 0 K/UL (ref 0–0.4)
EOSINOPHIL NFR BLD: 0 % (ref 0–7)
EPITH CASTS URNS QL MICRO: ABNORMAL /LPF
ERYTHROCYTE [DISTWIDTH] IN BLOOD BY AUTOMATED COUNT: 13.2 % (ref 11.5–14.5)
GLOBULIN SER CALC-MCNC: 4.1 G/DL (ref 2–4)
GLUCOSE BLD STRIP.AUTO-MCNC: 129 MG/DL (ref 65–117)
GLUCOSE SERPL-MCNC: 126 MG/DL (ref 65–100)
GLUCOSE UR STRIP.AUTO-MCNC: NEGATIVE MG/DL
HCT VFR BLD AUTO: 47.1 % (ref 36.6–50.3)
HGB BLD-MCNC: 16.1 G/DL (ref 12.1–17)
HGB UR QL STRIP: NEGATIVE
HYALINE CASTS URNS QL MICRO: ABNORMAL /LPF (ref 0–2)
IMM GRANULOCYTES # BLD AUTO: 0 K/UL (ref 0–0.04)
IMM GRANULOCYTES NFR BLD AUTO: 0 % (ref 0–0.5)
KETONES UR QL STRIP.AUTO: ABNORMAL MG/DL
LACTATE BLD-SCNC: 2.4 MMOL/L (ref 0.4–2)
LACTATE BLD-SCNC: 3.91 MMOL/L (ref 0.4–2)
LEUKOCYTE ESTERASE UR QL STRIP.AUTO: NEGATIVE
LYMPHOCYTES # BLD: 0.8 K/UL (ref 0.8–3.5)
LYMPHOCYTES NFR BLD: 4 % (ref 12–49)
MCH RBC QN AUTO: 31.3 PG (ref 26–34)
MCHC RBC AUTO-ENTMCNC: 34.2 G/DL (ref 30–36.5)
MCV RBC AUTO: 91.6 FL (ref 80–99)
MONOCYTES # BLD: 0.8 K/UL (ref 0–1)
MONOCYTES NFR BLD: 4 % (ref 5–13)
NEUTS SEG # BLD: 17.6 K/UL (ref 1.8–8)
NEUTS SEG NFR BLD: 92 % (ref 32–75)
NITRITE UR QL STRIP.AUTO: NEGATIVE
NRBC # BLD: 0 K/UL (ref 0–0.01)
NRBC BLD-RTO: 0 PER 100 WBC
PH UR STRIP: 5 (ref 5–8)
PLATELET # BLD AUTO: 192 K/UL (ref 150–400)
PMV BLD AUTO: 12.9 FL (ref 8.9–12.9)
POTASSIUM SERPL-SCNC: 3.2 MMOL/L (ref 3.5–5.1)
POTASSIUM SERPL-SCNC: ABNORMAL MMOL/L (ref 3.5–5.1)
PROCALCITONIN SERPL-MCNC: <0.05 NG/ML
PROT SERPL-MCNC: 8 G/DL (ref 6.4–8.2)
PROT UR STRIP-MCNC: 30 MG/DL
RBC # BLD AUTO: 5.14 M/UL (ref 4.1–5.7)
RBC #/AREA URNS HPF: ABNORMAL /HPF (ref 0–5)
RBC MORPH BLD: ABNORMAL
SERVICE CMNT-IMP: ABNORMAL
SODIUM SERPL-SCNC: 132 MMOL/L (ref 136–145)
SP GR UR REFRACTOMETRY: 1.01 (ref 1–1.03)
SPECIMEN HOLD: NORMAL
URINE CULTURE IF INDICATED: ABNORMAL
UROBILINOGEN UR QL STRIP.AUTO: 0.2 EU/DL (ref 0.2–1)
WBC # BLD AUTO: 19.2 K/UL (ref 4.1–11.1)
WBC URNS QL MICRO: ABNORMAL /HPF (ref 0–4)

## 2024-03-29 PROCEDURE — 70450 CT HEAD/BRAIN W/O DYE: CPT

## 2024-03-29 PROCEDURE — 1100000000 HC RM PRIVATE

## 2024-03-29 PROCEDURE — 82962 GLUCOSE BLOOD TEST: CPT

## 2024-03-29 PROCEDURE — 84145 PROCALCITONIN (PCT): CPT

## 2024-03-29 PROCEDURE — 81001 URINALYSIS AUTO W/SCOPE: CPT

## 2024-03-29 PROCEDURE — 84132 ASSAY OF SERUM POTASSIUM: CPT

## 2024-03-29 PROCEDURE — 71045 X-RAY EXAM CHEST 1 VIEW: CPT

## 2024-03-29 PROCEDURE — 6360000002 HC RX W HCPCS: Performed by: EMERGENCY MEDICINE

## 2024-03-29 PROCEDURE — 80053 COMPREHEN METABOLIC PANEL: CPT

## 2024-03-29 PROCEDURE — 84460 ALANINE AMINO (ALT) (SGPT): CPT

## 2024-03-29 PROCEDURE — 84450 TRANSFERASE (AST) (SGOT): CPT

## 2024-03-29 PROCEDURE — 36415 COLL VENOUS BLD VENIPUNCTURE: CPT

## 2024-03-29 PROCEDURE — 83605 ASSAY OF LACTIC ACID: CPT

## 2024-03-29 PROCEDURE — 96375 TX/PRO/DX INJ NEW DRUG ADDON: CPT

## 2024-03-29 PROCEDURE — 2580000003 HC RX 258: Performed by: EMERGENCY MEDICINE

## 2024-03-29 PROCEDURE — 96374 THER/PROPH/DIAG INJ IV PUSH: CPT

## 2024-03-29 PROCEDURE — 85025 COMPLETE CBC W/AUTO DIFF WBC: CPT

## 2024-03-29 PROCEDURE — 99285 EMERGENCY DEPT VISIT HI MDM: CPT

## 2024-03-29 RX ORDER — METOCLOPRAMIDE HYDROCHLORIDE 5 MG/ML
10 INJECTION INTRAMUSCULAR; INTRAVENOUS
Status: COMPLETED | OUTPATIENT
Start: 2024-03-29 | End: 2024-03-29

## 2024-03-29 RX ORDER — 0.9 % SODIUM CHLORIDE 0.9 %
1000 INTRAVENOUS SOLUTION INTRAVENOUS ONCE
Status: COMPLETED | OUTPATIENT
Start: 2024-03-29 | End: 2024-03-29

## 2024-03-29 RX ORDER — DIPHENHYDRAMINE HYDROCHLORIDE 50 MG/ML
25 INJECTION INTRAMUSCULAR; INTRAVENOUS
Status: COMPLETED | OUTPATIENT
Start: 2024-03-29 | End: 2024-03-29

## 2024-03-29 RX ORDER — LORAZEPAM 2 MG/ML
INJECTION INTRAMUSCULAR
Status: DISCONTINUED
Start: 2024-03-29 | End: 2024-03-29 | Stop reason: WASHOUT

## 2024-03-29 RX ORDER — ONDANSETRON 2 MG/ML
4 INJECTION INTRAMUSCULAR; INTRAVENOUS ONCE
Status: COMPLETED | OUTPATIENT
Start: 2024-03-29 | End: 2024-03-29

## 2024-03-29 RX ORDER — KETOROLAC TROMETHAMINE 15 MG/ML
15 INJECTION, SOLUTION INTRAMUSCULAR; INTRAVENOUS ONCE
Status: COMPLETED | OUTPATIENT
Start: 2024-03-29 | End: 2024-03-29

## 2024-03-29 RX ADMIN — METOCLOPRAMIDE 10 MG: 5 INJECTION, SOLUTION INTRAMUSCULAR; INTRAVENOUS at 21:20

## 2024-03-29 RX ADMIN — DIPHENHYDRAMINE HYDROCHLORIDE 25 MG: 50 INJECTION, SOLUTION INTRAMUSCULAR; INTRAVENOUS at 21:20

## 2024-03-29 RX ADMIN — ONDANSETRON 4 MG: 2 INJECTION INTRAMUSCULAR; INTRAVENOUS at 19:23

## 2024-03-29 RX ADMIN — LEVETIRACETAM 4000 MG: 100 INJECTION, SOLUTION INTRAVENOUS at 19:31

## 2024-03-29 RX ADMIN — KETOROLAC TROMETHAMINE 15 MG: 15 INJECTION, SOLUTION INTRAMUSCULAR; INTRAVENOUS at 18:24

## 2024-03-29 RX ADMIN — SODIUM CHLORIDE 1000 ML: 9 INJECTION, SOLUTION INTRAVENOUS at 18:23

## 2024-03-29 ASSESSMENT — PAIN - FUNCTIONAL ASSESSMENT: PAIN_FUNCTIONAL_ASSESSMENT: 0-10

## 2024-03-29 ASSESSMENT — PAIN SCALES - GENERAL: PAINLEVEL_OUTOF10: 8

## 2024-03-29 NOTE — ED PROVIDER NOTES
[facility] EMERGENCY DEPT  EMERGENCY DEPARTMENT ENCOUNTER      Pt Name: Lev Faust  MRN: [id number]  Birthdate 1950  Date of evaluation: 3/29/2024  Provider: Ricki Llanos MD    CHIEF COMPLAINT       Chief Complaint   Patient presents with    Seizures         HISTORY OF PRESENT ILLNESS   (Location/Symptom, Timing/Onset, Context/Setting, Quality, Duration, Modifying Factors, Severity)  Note limiting factors.   73-year-old man with PMHx of hepatitis C status posttreatment, hypertension, pancreatitis, and seizures 1 year ago not on any antiepileptic medication presents to the emergency department via EMS for evaluation following a witnessed seizure lasting two minutes.  Pt was postictal when EMS arrived.  Pt's previous seizure was a year ago 03/31/2023.  No other known seizure activity.  He has no additional complaints at this time    The history is provided by the patient, the EMS personnel and the spouse.         Review of External Medical Records:     Nursing Notes were reviewed.    REVIEW OF SYSTEMS    (2-9 systems for level 4, 10 or more for level 5)     Review of Systems   Constitutional: Negative.    HENT: Negative.     Eyes: Negative.    Respiratory: Negative.     Cardiovascular: Negative.    Gastrointestinal: Negative.    Genitourinary: Negative.    Musculoskeletal: Negative.    Skin: Negative.    Neurological:  Positive for seizures.   Psychiatric/Behavioral: Negative.         Except as noted above the remainder of the review of systems was reviewed and negative.       PAST MEDICAL HISTORY     Past Medical History:   Diagnosis Date    Diverticulitis 2018    Erectile dysfunction 2020    Headache     Often    Hearing loss 2019    worse every day    Hepatitis C 1992    Hypertension     Pancreatitis 2016    Seizures (HCC) 3/31/2023    1 as a result of a concussion    Sleep apnea 1980         SURGICAL HISTORY       Past Surgical History:   Procedure Laterality Date    COLONOSCOPY N/A 2/23/2018

## 2024-03-29 NOTE — ED TRIAGE NOTES
Pt arrives via EMS reporting witnessed seizure lasting two minutes.  Pt was postictal when EMS arrived.  Pt's previous seizure was a year ago 03/31/2023.  No other known seizure activity.

## 2024-03-29 NOTE — ED NOTES
Wife reaches out for help that pt is seizing.  NRB applied for pulse ox in the mid 80's.  Suction for secretion.  Activity stops before ativan is available.  Pt is postictal and mildly combative.  Pt has abrasion to right side of forehead.

## 2024-03-30 ENCOUNTER — APPOINTMENT (OUTPATIENT)
Facility: HOSPITAL | Age: 74
DRG: 897 | End: 2024-03-30
Payer: MEDICARE

## 2024-03-30 ENCOUNTER — TELEPHONE (OUTPATIENT)
Age: 74
End: 2024-03-30

## 2024-03-30 LAB
COMMENT:: NORMAL
LACTATE SERPL-SCNC: 2.7 MMOL/L (ref 0.4–2)
POTASSIUM SERPL-SCNC: 3.4 MMOL/L (ref 3.5–5.1)
SPECIMEN HOLD: NORMAL

## 2024-03-30 PROCEDURE — 83605 ASSAY OF LACTIC ACID: CPT

## 2024-03-30 PROCEDURE — 36415 COLL VENOUS BLD VENIPUNCTURE: CPT

## 2024-03-30 PROCEDURE — 6370000000 HC RX 637 (ALT 250 FOR IP): Performed by: PSYCHIATRY & NEUROLOGY

## 2024-03-30 PROCEDURE — 70551 MRI BRAIN STEM W/O DYE: CPT

## 2024-03-30 PROCEDURE — 2060000000 HC ICU INTERMEDIATE R&B

## 2024-03-30 PROCEDURE — 6360000002 HC RX W HCPCS: Performed by: INTERNAL MEDICINE

## 2024-03-30 PROCEDURE — 6370000000 HC RX 637 (ALT 250 FOR IP): Performed by: INTERNAL MEDICINE

## 2024-03-30 PROCEDURE — 2580000003 HC RX 258: Performed by: INTERNAL MEDICINE

## 2024-03-30 PROCEDURE — 80307 DRUG TEST PRSMV CHEM ANLYZR: CPT

## 2024-03-30 PROCEDURE — 99223 1ST HOSP IP/OBS HIGH 75: CPT | Performed by: PSYCHIATRY & NEUROLOGY

## 2024-03-30 PROCEDURE — 84132 ASSAY OF SERUM POTASSIUM: CPT

## 2024-03-30 RX ORDER — ONDANSETRON 2 MG/ML
4 INJECTION INTRAMUSCULAR; INTRAVENOUS EVERY 6 HOURS PRN
Status: DISCONTINUED | OUTPATIENT
Start: 2024-03-30 | End: 2024-03-31 | Stop reason: HOSPADM

## 2024-03-30 RX ORDER — POTASSIUM CHLORIDE 750 MG/1
40 TABLET, FILM COATED, EXTENDED RELEASE ORAL ONCE
Status: COMPLETED | OUTPATIENT
Start: 2024-03-30 | End: 2024-03-30

## 2024-03-30 RX ORDER — POTASSIUM CHLORIDE 7.45 MG/ML
10 INJECTION INTRAVENOUS PRN
Status: DISCONTINUED | OUTPATIENT
Start: 2024-03-30 | End: 2024-03-31 | Stop reason: HOSPADM

## 2024-03-30 RX ORDER — ENOXAPARIN SODIUM 100 MG/ML
40 INJECTION SUBCUTANEOUS DAILY
Status: DISCONTINUED | OUTPATIENT
Start: 2024-03-30 | End: 2024-03-31 | Stop reason: HOSPADM

## 2024-03-30 RX ORDER — MAGNESIUM SULFATE IN WATER 40 MG/ML
2000 INJECTION, SOLUTION INTRAVENOUS PRN
Status: DISCONTINUED | OUTPATIENT
Start: 2024-03-30 | End: 2024-03-31 | Stop reason: HOSPADM

## 2024-03-30 RX ORDER — ALPRAZOLAM 0.5 MG/1
1 TABLET ORAL 2 TIMES DAILY PRN
Status: DISCONTINUED | OUTPATIENT
Start: 2024-03-30 | End: 2024-03-31 | Stop reason: HOSPADM

## 2024-03-30 RX ORDER — SODIUM CHLORIDE 9 MG/ML
INJECTION, SOLUTION INTRAVENOUS PRN
Status: DISCONTINUED | OUTPATIENT
Start: 2024-03-30 | End: 2024-03-31 | Stop reason: HOSPADM

## 2024-03-30 RX ORDER — SODIUM CHLORIDE 0.9 % (FLUSH) 0.9 %
5-40 SYRINGE (ML) INJECTION PRN
Status: DISCONTINUED | OUTPATIENT
Start: 2024-03-30 | End: 2024-03-31 | Stop reason: HOSPADM

## 2024-03-30 RX ORDER — ONDANSETRON 4 MG/1
4 TABLET, ORALLY DISINTEGRATING ORAL EVERY 8 HOURS PRN
Status: DISCONTINUED | OUTPATIENT
Start: 2024-03-30 | End: 2024-03-31 | Stop reason: HOSPADM

## 2024-03-30 RX ORDER — OXYCODONE HYDROCHLORIDE 5 MG/1
5 TABLET ORAL EVERY 4 HOURS PRN
Status: DISCONTINUED | OUTPATIENT
Start: 2024-03-30 | End: 2024-03-31 | Stop reason: HOSPADM

## 2024-03-30 RX ORDER — ACETAMINOPHEN 650 MG/1
650 SUPPOSITORY RECTAL EVERY 6 HOURS PRN
Status: DISCONTINUED | OUTPATIENT
Start: 2024-03-30 | End: 2024-03-31 | Stop reason: HOSPADM

## 2024-03-30 RX ORDER — ATORVASTATIN CALCIUM 10 MG/1
10 TABLET, FILM COATED ORAL NIGHTLY
Status: DISCONTINUED | OUTPATIENT
Start: 2024-03-30 | End: 2024-03-31 | Stop reason: HOSPADM

## 2024-03-30 RX ORDER — SODIUM CHLORIDE 0.9 % (FLUSH) 0.9 %
5-40 SYRINGE (ML) INJECTION EVERY 12 HOURS SCHEDULED
Status: DISCONTINUED | OUTPATIENT
Start: 2024-03-30 | End: 2024-03-31 | Stop reason: HOSPADM

## 2024-03-30 RX ORDER — POTASSIUM CHLORIDE 750 MG/1
40 TABLET, FILM COATED, EXTENDED RELEASE ORAL PRN
Status: DISCONTINUED | OUTPATIENT
Start: 2024-03-30 | End: 2024-03-31 | Stop reason: HOSPADM

## 2024-03-30 RX ORDER — ACETAMINOPHEN 325 MG/1
650 TABLET ORAL EVERY 6 HOURS PRN
Status: DISCONTINUED | OUTPATIENT
Start: 2024-03-30 | End: 2024-03-31 | Stop reason: HOSPADM

## 2024-03-30 RX ORDER — ASPIRIN 81 MG/1
81 TABLET, CHEWABLE ORAL DAILY
Status: DISCONTINUED | OUTPATIENT
Start: 2024-03-30 | End: 2024-03-31 | Stop reason: HOSPADM

## 2024-03-30 RX ORDER — LEVETIRACETAM 500 MG/5ML
1000 INJECTION, SOLUTION, CONCENTRATE INTRAVENOUS EVERY 12 HOURS
Status: DISCONTINUED | OUTPATIENT
Start: 2024-03-30 | End: 2024-03-31 | Stop reason: HOSPADM

## 2024-03-30 RX ORDER — SODIUM CHLORIDE 9 MG/ML
INJECTION, SOLUTION INTRAVENOUS CONTINUOUS
Status: DISCONTINUED | OUTPATIENT
Start: 2024-03-30 | End: 2024-03-31 | Stop reason: HOSPADM

## 2024-03-30 RX ORDER — POLYETHYLENE GLYCOL 3350 17 G/17G
17 POWDER, FOR SOLUTION ORAL DAILY PRN
Status: DISCONTINUED | OUTPATIENT
Start: 2024-03-30 | End: 2024-03-31 | Stop reason: HOSPADM

## 2024-03-30 RX ORDER — CLONAZEPAM 0.5 MG/1
1 TABLET ORAL NIGHTLY PRN
Status: DISCONTINUED | OUTPATIENT
Start: 2024-03-30 | End: 2024-03-31 | Stop reason: HOSPADM

## 2024-03-30 RX ADMIN — SODIUM CHLORIDE, PRESERVATIVE FREE 10 ML: 5 INJECTION INTRAVENOUS at 20:35

## 2024-03-30 RX ADMIN — ACETAMINOPHEN 650 MG: 325 TABLET ORAL at 23:09

## 2024-03-30 RX ADMIN — LEVETIRACETAM 1000 MG: 100 INJECTION, SOLUTION INTRAVENOUS at 06:03

## 2024-03-30 RX ADMIN — OXYCODONE 5 MG: 5 TABLET ORAL at 12:39

## 2024-03-30 RX ADMIN — POTASSIUM CHLORIDE 40 MEQ: 750 TABLET, EXTENDED RELEASE ORAL at 01:09

## 2024-03-30 RX ADMIN — OXYCODONE 5 MG: 5 TABLET ORAL at 20:33

## 2024-03-30 RX ADMIN — SODIUM CHLORIDE, PRESERVATIVE FREE 10 ML: 5 INJECTION INTRAVENOUS at 09:11

## 2024-03-30 RX ADMIN — ATORVASTATIN CALCIUM 10 MG: 10 TABLET, FILM COATED ORAL at 20:33

## 2024-03-30 RX ADMIN — ASPIRIN 81 MG: 81 TABLET, CHEWABLE ORAL at 12:39

## 2024-03-30 RX ADMIN — SODIUM CHLORIDE: 9 INJECTION, SOLUTION INTRAVENOUS at 21:38

## 2024-03-30 RX ADMIN — ENOXAPARIN SODIUM 40 MG: 100 INJECTION SUBCUTANEOUS at 09:13

## 2024-03-30 RX ADMIN — ACETAMINOPHEN 650 MG: 325 TABLET ORAL at 01:09

## 2024-03-30 RX ADMIN — SODIUM CHLORIDE: 9 INJECTION, SOLUTION INTRAVENOUS at 09:28

## 2024-03-30 RX ADMIN — POTASSIUM CHLORIDE 40 MEQ: 750 TABLET, EXTENDED RELEASE ORAL at 14:33

## 2024-03-30 ASSESSMENT — PAIN SCALES - GENERAL
PAINLEVEL_OUTOF10: 4
PAINLEVEL_OUTOF10: 8
PAINLEVEL_OUTOF10: 5
PAINLEVEL_OUTOF10: 7
PAINLEVEL_OUTOF10: 7
PAINLEVEL_OUTOF10: 5

## 2024-03-30 ASSESSMENT — PAIN DESCRIPTION - LOCATION
LOCATION: HEAD;ABDOMEN;BACK
LOCATION: BACK;HEAD
LOCATION: BACK;ABDOMEN;OTHER (COMMENT)

## 2024-03-30 ASSESSMENT — PAIN DESCRIPTION - PAIN TYPE: TYPE: ACUTE PAIN

## 2024-03-30 ASSESSMENT — PAIN - FUNCTIONAL ASSESSMENT: PAIN_FUNCTIONAL_ASSESSMENT: ACTIVITIES ARE NOT PREVENTED

## 2024-03-30 ASSESSMENT — PAIN DESCRIPTION - DESCRIPTORS: DESCRIPTORS: ACHING

## 2024-03-30 NOTE — FLOWSHEET NOTE
Pt arrived to the unit from the ED on a stretcher. Pt was transferred to the bed, placed on the monitor, VS taken, and admission completed. Suction and Yankauer in place. Seizure pads on all 4 rails.

## 2024-03-30 NOTE — CONSULTS
1102 03/30/24 1239   BP: 114/70 124/79 (!) 131/91    Pulse: 60 52 58    Resp: 16 15 16 14   Temp: 99.3 °F (37.4 °C) 98.4 °F (36.9 °C) 98.4 °F (36.9 °C)    TempSrc: Oral Oral Oral    SpO2: 97% 95% 98%    Weight:       Height:            NEUROLOGIC EXAM:    Bruising over right forehead    Mental Status   Oriented to: x 3 but pt says he doesn't feel like alert  Speech: no dysarthria, no aphasia;     Cranial Nerves: EOMI, VF normal bilateral, face symmetric (bruising in right frontal area), smile symmetric, tongue midline/ bite on left side, increased sensitivity to LT on right side face, normal LT on left side face, hearing is normal, shrug not examined    Motor:    5/5 in all exts     Sensory:   Intact LT in all exts    Cerebellar   No resting, postural, or intention tremor on either side    DTRs   1+ symmetric    Plantar response  Not examined    Gait/ Romberg   Not examined    =====================================    IMPRESSION/ PLAN:     Episode of seizure x 2 on 3-.  Pt reports being out of his Clonazepam x 3 weeks and Olanzapine x 1.5 weeks (and not sleeping because of that) leading up to these seizures. Had seizure in March 2023 which was felt to be symptomatic in nature. Was loaded on Keppra 4000 mg IV x 1 in ER.  EEG in past was normal.  Brain MRI here doesn't show any acute abnormality.  Cannot do EEG here on weekend unless it's emergency (ie suspect status epilepticus).  Discussed with patient that as he has had 2 seizure episodes in the past 12 months, my recommendation is to start on a daily anti-seizure medication to reduce chance of future seizure.  He considered but feels this episode of seizure was again symptomatic in nature and declines to start AED at this time.      Elevated WBC count: no pain with neck flexion or rotation, no pain with knee flexion, no fevers; No exam findings to suggest meningitis.  I think this is secondary to the 2 recent seizures.  He had similar dx (Leukocytosis on

## 2024-03-30 NOTE — ED NOTES
Report given to VAN King on PCC for continuation of care. Report included SBAR, MAR, ED Summary, recent results, and plan of care. Opportunity to answer questions and provide clarification given.

## 2024-03-30 NOTE — PROGRESS NOTES
[facility]  [address] [place], [place] [address] [phone]      Hospitalist  Progress Note      NAME:       Lev Faust   :        1950  MRM:        [id number]    Date of service: 3/30/2024      Subjective: Patient seen and examined by me. Patient admitted with seizure activity. He says he had not slept well for s few days and that he was trying to wean himself off alprazolam. Currently stable.      Objective:    Vital Signs:    /79   Pulse 52   Temp 98.4 °F (36.9 °C) (Oral)   Resp 15   Ht 1.753 m (5' 9\")   Wt 67 kg (147 lb 11.2 oz)   SpO2 95%   BMI 21.81 kg/m²        Intake/Output Summary (Last 24 hours) at 3/30/2024 0824  Last data filed at 3/30/2024 0639  Gross per 24 hour   Intake 1700 ml   Output --   Net 1700 ml        Current inpatient medications reviewed:  Current Facility-Administered Medications   Medication Dose Route Frequency    ALPRAZolam (XANAX) tablet 1 mg  1 mg Oral BID PRN    clonazePAM (KLONOPIN) tablet 1 mg  1 mg Oral Nightly PRN    oxyCODONE (ROXICODONE) immediate release tablet 5 mg  5 mg Oral Q4H PRN    sodium chloride flush 0.9 % injection 5-40 mL  5-40 mL IntraVENous 2 times per day    sodium chloride flush 0.9 % injection 5-40 mL  5-40 mL IntraVENous PRN    0.9 % sodium chloride infusion   IntraVENous PRN    potassium chloride (KLOR-CON) extended release tablet 40 mEq  40 mEq Oral PRN    Or    potassium bicarb-citric acid (EFFER-K) effervescent tablet 40 mEq  40 mEq Oral PRN    Or    potassium chloride 10 mEq/100 mL IVPB (Peripheral Line)  10 mEq IntraVENous PRN    magnesium sulfate 2000 mg in 50 mL IVPB premix  2,000 mg IntraVENous PRN    enoxaparin (LOVENOX) injection 40 mg  40 mg SubCUTAneous Daily    ondansetron (ZOFRAN-ODT) disintegrating tablet 4 mg  4 mg Oral Q8H PRN    Or    ondansetron (ZOFRAN) injection 4 mg  4 mg IntraVENous Q6H PRN    polyethylene

## 2024-03-30 NOTE — ED NOTES
Pt gown changed, personal items placed into belonging bags. Non slip socks applied. Pt provided with juice per request.

## 2024-03-30 NOTE — H&P
[facility]  [address] [place]  [address] [phone]    Hospital Medicine Admission History and Physical      NAME:  Lev Faust   :   1950   MRN:  [id number]     PCP:  Fatoumata Marques APRN - NP     Date of service:  3/29/2024         Subjective:     CHIEF COMPLAINT: Seizure    HISTORY OF PRESENT ILLNESS:     Mr. Faust is a 73 y.o.   male who is admitted with seizure.  Mr. Faust's past medical history of seizure, hepatitis C, HTN presented to ER after he had a seizure.  Patient is drowsy after he was given medication in the ER.  History was obtained from ER staff.  In the ER, had another episode.  Last seizure also about a year ago.  Patient is not on any antiseizure medication.  After his initial seizure, patient was in postictal, confused.      Past Medical History:   Diagnosis Date    Diverticulitis 2018    Erectile dysfunction     Headache     Often    Hearing loss 2019    worse every day    Hepatitis C 1992    Hypertension     Pancreatitis 2016    Seizures (HCC) 3/31/2023    1 as a result of a concussion    Sleep apnea         Past Surgical History:   Procedure Laterality Date    COLONOSCOPY N/A 2018    COLONOSCOPY performed by Kenji Sosa MD at [facility] ENDOSCOPY    COLONOSCOPY N/A 05/10/2023    COLONOSCOPY performed by Anatoly Spann MD at [facility] ENDOSCOPY    SMALL INTESTINE SURGERY  2018    Divertriiculits    TONSILLECTOMY         Social History     Tobacco Use    Smoking status: Never    Smokeless tobacco: Never    Tobacco comments:     N/A   Substance Use Topics    Alcohol use: Yes     Alcohol/week: 3.0 standard drinks of alcohol     Types: 3 Glasses of wine per week        Family History   Problem Relation Age of Onset    Cancer Father         Smoker    Cancer Brother         Smoker        No Known Allergies     Prior to Admission medications    Medication Sig Start Date End Date Taking? Authorizing Provider   [email or web address]

## 2024-03-30 NOTE — ED NOTES
Pt resting in comfortable position in bed at this time. Pt provided with blankets. Pt able to void via urinal with minimal assistance.     Pt oriented to self, requiring redirection to time, pt oriented to self and situation.

## 2024-03-30 NOTE — ED NOTES
Pt remaining in postictal state, requiring redirection to time. Pt reporting persistent nausea after zofran.

## 2024-03-30 NOTE — TELEPHONE ENCOUNTER
Patient needs a hospital follow up appointment    Provider: Dr Griffin  In person or virtual: in person  When: 6 weeks  Diagnosis/ Reason for follow-up: Recurrent seizure    Additional information (I.e, best phone number or family member to call, etc):     Contact pt to set up visit

## 2024-03-31 VITALS
BODY MASS INDEX: 21.88 KG/M2 | DIASTOLIC BLOOD PRESSURE: 80 MMHG | SYSTOLIC BLOOD PRESSURE: 145 MMHG | RESPIRATION RATE: 17 BRPM | OXYGEN SATURATION: 97 % | HEART RATE: 60 BPM | HEIGHT: 69 IN | WEIGHT: 147.7 LBS | TEMPERATURE: 98.4 F

## 2024-03-31 LAB
ANION GAP SERPL CALC-SCNC: 3 MMOL/L (ref 5–15)
BASOPHILS # BLD: 0 K/UL (ref 0–0.1)
BASOPHILS NFR BLD: 0 % (ref 0–1)
BUN SERPL-MCNC: 7 MG/DL (ref 6–20)
BUN/CREAT SERPL: 11 (ref 12–20)
CALCIUM SERPL-MCNC: 8.8 MG/DL (ref 8.5–10.1)
CHLORIDE SERPL-SCNC: 110 MMOL/L (ref 97–108)
CHOLEST SERPL-MCNC: 104 MG/DL
CO2 SERPL-SCNC: 28 MMOL/L (ref 21–32)
CREAT SERPL-MCNC: 0.65 MG/DL (ref 0.7–1.3)
DIFFERENTIAL METHOD BLD: ABNORMAL
EOSINOPHIL # BLD: 0.1 K/UL (ref 0–0.4)
EOSINOPHIL NFR BLD: 1 % (ref 0–7)
ERYTHROCYTE [DISTWIDTH] IN BLOOD BY AUTOMATED COUNT: 12.7 % (ref 11.5–14.5)
GLUCOSE SERPL-MCNC: 96 MG/DL (ref 65–100)
HCT VFR BLD AUTO: 39.5 % (ref 36.6–50.3)
HDLC SERPL-MCNC: 45 MG/DL
HDLC SERPL: 2.3 (ref 0–5)
HGB BLD-MCNC: 13.4 G/DL (ref 12.1–17)
IMM GRANULOCYTES # BLD AUTO: 0 K/UL (ref 0–0.04)
IMM GRANULOCYTES NFR BLD AUTO: 0 % (ref 0–0.5)
LACTATE SERPL-SCNC: 0.7 MMOL/L (ref 0.4–2)
LDLC SERPL CALC-MCNC: 45.8 MG/DL (ref 0–100)
LYMPHOCYTES # BLD: 2.3 K/UL (ref 0.8–3.5)
LYMPHOCYTES NFR BLD: 25 % (ref 12–49)
MCH RBC QN AUTO: 31 PG (ref 26–34)
MCHC RBC AUTO-ENTMCNC: 33.9 G/DL (ref 30–36.5)
MCV RBC AUTO: 91.4 FL (ref 80–99)
MONOCYTES # BLD: 0.8 K/UL (ref 0–1)
MONOCYTES NFR BLD: 9 % (ref 5–13)
NEUTS SEG # BLD: 6.1 K/UL (ref 1.8–8)
NEUTS SEG NFR BLD: 65 % (ref 32–75)
NRBC # BLD: 0 K/UL (ref 0–0.01)
NRBC BLD-RTO: 0 PER 100 WBC
PLATELET # BLD AUTO: 136 K/UL (ref 150–400)
PMV BLD AUTO: 11.8 FL (ref 8.9–12.9)
POTASSIUM SERPL-SCNC: 3.6 MMOL/L (ref 3.5–5.1)
RBC # BLD AUTO: 4.32 M/UL (ref 4.1–5.7)
RBC MORPH BLD: ABNORMAL
SODIUM SERPL-SCNC: 141 MMOL/L (ref 136–145)
TRIGL SERPL-MCNC: 66 MG/DL
VLDLC SERPL CALC-MCNC: 13.2 MG/DL
WBC # BLD AUTO: 9.3 K/UL (ref 4.1–11.1)

## 2024-03-31 PROCEDURE — 6370000000 HC RX 637 (ALT 250 FOR IP): Performed by: INTERNAL MEDICINE

## 2024-03-31 PROCEDURE — 85025 COMPLETE CBC W/AUTO DIFF WBC: CPT

## 2024-03-31 PROCEDURE — 2580000003 HC RX 258: Performed by: INTERNAL MEDICINE

## 2024-03-31 PROCEDURE — 80061 LIPID PANEL: CPT

## 2024-03-31 PROCEDURE — 36415 COLL VENOUS BLD VENIPUNCTURE: CPT

## 2024-03-31 PROCEDURE — 80048 BASIC METABOLIC PNL TOTAL CA: CPT

## 2024-03-31 PROCEDURE — 83605 ASSAY OF LACTIC ACID: CPT

## 2024-03-31 RX ADMIN — OXYCODONE 5 MG: 5 TABLET ORAL at 04:25

## 2024-03-31 RX ADMIN — OXYCODONE 5 MG: 5 TABLET ORAL at 00:33

## 2024-03-31 RX ADMIN — SODIUM CHLORIDE: 9 INJECTION, SOLUTION INTRAVENOUS at 05:10

## 2024-03-31 ASSESSMENT — PAIN SCALES - GENERAL
PAINLEVEL_OUTOF10: 7
PAINLEVEL_OUTOF10: 7

## 2024-03-31 NOTE — DISCHARGE SUMMARY
[facility]  [address] [place] [address]  Tel: [phone]    Hospital Medicine Discharge Summary    Patient ID:    Lev Faust  Age:              73 y.o.    : 1950  MRN:             [id number]     PCP: Fatoumata Marques APRN - NP     Date of Admission: 3/29/2024    Date of Discharge:  3/31/2024    Discharge Diagnoses:  Principal Problem:    Seizure (HCC)  Active Problems:    Hypokalemia    Hepatitis C virus infection cured after antiviral drug therapy    Elevated lactic acid level  Resolved Problems:    * No resolved hospital problems. *       Reason for admission:    Seizure (HCC) [R56.9]    Diagnostic testing:    Laboratory data reviewed and independently interpreted:    Recent Labs     24  0408   WBC 19.2* 9.3   HGB 16.1 13.4   HCT 47.1 39.5   RBC 5.14 4.32   MCV 91.6 91.4   MCH 31.3 31.0    136*     No results found for: \"LACTA\"  Recent Labs     24  1919 24  0916 24  0408   *  --   --  141   K Hemolyzed, Recollection Recommended 3.2* 3.4* 3.6     --   --  110*   CO2 26  --   --  28   GLUCOSE 126*  --   --  96   BUN 10  --   --  7   CREATININE 0.99  --   --  0.65*   CALCIUM 10.2*  --   --  8.8   PROT 8.0  --   --   --    BILITOT 0.4  --   --   --    ALKPHOS 63  --   --   --    AST Hemolyzed, Recollection Recommended 19  --   --    ALT Hemolyzed, Recollection Recommended 23  --   --      No components found for: \"GLUCOSEPOC\"  No results found for: \"CHOL\", \"TRIG\", \"HDL\", \"LDLCALC\"    Imaging data reviewed:    MRI BRAIN WO CONTRAST    Result Date: 3/30/2024  No significant change. No evidence of acute process.    CT HEAD WO CONTRAST    Result Date: 3/29/2024  No acute abnormality    XR CHEST PORTABLE    Result Date: 3/29/2024  1. No acute disease     Hospital Course:     Mr. Faust is a 73 y.o. admitted to [facility] and treated for the

## 2024-03-31 NOTE — DISCHARGE INSTRUCTIONS
Hospital Medicine DISCHARGE INSTRUCTIONS    NAME: Lev Faust   :  1950   MRN:  [id number]     Date:     3/31/2024    Admission date: 3/29/2024     Discharge date:  3/31/2024     Reason for your admission:  Seizure (HCC) [R56.9]    Discharge Diagnoses:  Seizure    DISCHARGE INSTRUCTIONS:    Thank you for allowing us to participate in your care. Your discharging Hospitalist is Asad Pritchett MD. You were admitted for evaluation and treatment for the above diagnoses.    Medications:     It is important that medications are taken exactly as they are prescribed on the discharge medication instructions and keep them your  in the bottles provided by the pharmacist.   Keep a list of the medication names, dosages, and times to be taken at all times.    Do not take other medications without consulting your doctor.     Recommended diet:  regular diet    Recommended activity: activity as tolerated and no driving for 3 months    Post discharge care:    For questions regarding your Hospitalization or to contact the Hospital Medicine team, please call [phone].    Notify follow up health care provider or return to the emergency department if you cannot get hold of your doctor if you feel worse or experience symptoms similar to those that brought you to hospital    Fatoumata Marques, APRN - NP  [address] [facility] [address]  [address]  [facility] [address] [phone]    Schedule an appointment as soon as possible for a visit  to schedule a regular follow up after discharge    Gregorio Griffin Jr., MD  [address]  [facility] [address] [phone]    Follow up  Call to confirm follow up with neurology in 6 weeks if you do not hear from them       Information obtained by :  I understand that if any problems occur once I am at home I am to contact my physician and I understand and acknowledge receipt of the instructions indicated above.

## 2024-04-02 ENCOUNTER — TELEPHONE (OUTPATIENT)
Age: 74
End: 2024-04-02

## 2024-04-02 ASSESSMENT — ENCOUNTER SYMPTOMS
EYES NEGATIVE: 1
RESPIRATORY NEGATIVE: 1
GASTROINTESTINAL NEGATIVE: 1

## 2024-04-04 LAB
AMPHETAMINES UR QL SCN: NEGATIVE NG/ML
BARBITURATES UR QL SCN: NEGATIVE NG/ML
BENZODIAZ UR QL: NEGATIVE NG/ML
BZE UR QL: NEGATIVE NG/ML
CANNABINOIDS UR QL CFM: POSITIVE
CANNABINOIDS UR QL SCN: NORMAL NG/ML
MDMA URINE: NEGATIVE NG/ML
METHADONE UR QL SCN: NEGATIVE NG/ML
METHAQUALONE UR QL: NEGATIVE NG/ML
OPIATES UR QL: NEGATIVE NG/ML
PCP UR QL: NEGATIVE NG/ML
PROPOXYPH UR QL: NEGATIVE NG/ML
THC UR CFM-MCNC: >750 NG/ML

## 2024-04-26 ENCOUNTER — TELEPHONE (OUTPATIENT)
Age: 74
End: 2024-04-26

## 2024-04-26 NOTE — TELEPHONE ENCOUNTER
Reached out to the patient to schedule a hospital follow up with Caroline Lovell. We can leave the follow up with Dr. Lowe but since the patient needed to be seen sooner schedule with Cristela CANNON to call and schedule.

## 2024-07-01 ENCOUNTER — OFFICE VISIT (OUTPATIENT)
Age: 74
End: 2024-07-01
Payer: MEDICARE

## 2024-07-01 VITALS
RESPIRATION RATE: 18 BRPM | OXYGEN SATURATION: 97 % | TEMPERATURE: 97.6 F | SYSTOLIC BLOOD PRESSURE: 132 MMHG | DIASTOLIC BLOOD PRESSURE: 80 MMHG | HEART RATE: 70 BPM | HEIGHT: 69 IN | WEIGHT: 146 LBS | BODY MASS INDEX: 21.62 KG/M2

## 2024-07-01 DIAGNOSIS — Z86.73 HISTORY OF STROKE: ICD-10-CM

## 2024-07-01 DIAGNOSIS — G44.86 CERVICOGENIC HEADACHE: ICD-10-CM

## 2024-07-01 DIAGNOSIS — R56.9 SEIZURE (HCC): Primary | ICD-10-CM

## 2024-07-01 DIAGNOSIS — R41.3 MEMORY LOSS: ICD-10-CM

## 2024-07-01 PROCEDURE — G8427 DOCREV CUR MEDS BY ELIG CLIN: HCPCS | Performed by: PSYCHIATRY & NEUROLOGY

## 2024-07-01 PROCEDURE — 1123F ACP DISCUSS/DSCN MKR DOCD: CPT | Performed by: PSYCHIATRY & NEUROLOGY

## 2024-07-01 PROCEDURE — 1036F TOBACCO NON-USER: CPT | Performed by: PSYCHIATRY & NEUROLOGY

## 2024-07-01 PROCEDURE — G8420 CALC BMI NORM PARAMETERS: HCPCS | Performed by: PSYCHIATRY & NEUROLOGY

## 2024-07-01 PROCEDURE — 3017F COLORECTAL CA SCREEN DOC REV: CPT | Performed by: PSYCHIATRY & NEUROLOGY

## 2024-07-01 PROCEDURE — 99215 OFFICE O/P EST HI 40 MIN: CPT | Performed by: PSYCHIATRY & NEUROLOGY

## 2024-07-01 ASSESSMENT — PATIENT HEALTH QUESTIONNAIRE - PHQ9
SUM OF ALL RESPONSES TO PHQ QUESTIONS 1-9: 0
SUM OF ALL RESPONSES TO PHQ QUESTIONS 1-9: 0
SUM OF ALL RESPONSES TO PHQ9 QUESTIONS 1 & 2: 0
2. FEELING DOWN, DEPRESSED OR HOPELESS: NOT AT ALL
SUM OF ALL RESPONSES TO PHQ QUESTIONS 1-9: 0
1. LITTLE INTEREST OR PLEASURE IN DOING THINGS: NOT AT ALL
SUM OF ALL RESPONSES TO PHQ QUESTIONS 1-9: 0

## 2024-07-01 NOTE — PROGRESS NOTES
NEUROLOGY CLINIC NOTE    Patient ID:  Lev Faust  [id number]  74 y.o.  1950    Date of Visit:  July 1, 2024    Reason for Visit:  hospital follow up    Chief Complaint   Patient presents with    Seizures     Patient reports he went to [facility] 3/29/2024 due to having a seizure. Patient stated the seizure was witnessed and said he fell on floor and started shaking and last about 2 minutes. Patient stated he had another seizure while he was in the hospital. Patient denies having anymore seizures since then.       History of Present Illness:     Patient Active Problem List    Diagnosis Date Noted    Elevated lactic acid level 03/29/2024    Hepatitis C virus infection cured after antiviral drug therapy 02/20/2024    Seizure (HCC) 03/29/2023    Hypokalemia 03/29/2023    Leukocytosis 03/29/2023    Rib fracture 03/29/2023    Hyperglycemia 03/29/2023    Hypercalcemia 03/29/2023     Past Medical History:   Diagnosis Date    Diverticulitis 2018    Erectile dysfunction 2020    Headache     Often    Hearing loss 2019    worse every day    Hepatitis C 1992    Hypertension     Pancreatitis 2016    Seizures (HCC) 3/31/2023    1 as a result of a concussion    Sleep apnea 1980      Past Surgical History:   Procedure Laterality Date    COLONOSCOPY N/A 2/23/2018    COLONOSCOPY performed by Kenji Sosa MD at [facility] ENDOSCOPY    COLONOSCOPY N/A 05/10/2023    COLONOSCOPY performed by Anatoly Spann MD at [facility] ENDOSCOPY    SMALL INTESTINE SURGERY  2018    Divertriiculits    TONSILLECTOMY  1955      Current Outpatient Medications on File Prior to Visit   Medication Sig Dispense Refill    amLODIPine (NORVASC) 10 MG tablet Take 1 tablet by mouth daily      phytonadione (VITAMIN K) 5 MG tablet Take 1 tablet by mouth once      MULTIPLE VITAMIN PO Take by mouth      ALPRAZolam (XANAX) 1 MG tablet Take 1 tablet by mouth 2 times daily as needed.      testosterone cypionate (DEPOTESTOTERONE CYPIONATE) 200 MG/ML injection INJECT 1 ML

## 2024-07-03 ENCOUNTER — PROCEDURE VISIT (OUTPATIENT)
Age: 74
End: 2024-07-03

## 2024-07-03 DIAGNOSIS — R56.9 SEIZURE (HCC): Primary | ICD-10-CM

## 2024-07-03 NOTE — PROGRESS NOTES
[facility]     Electroencephalogram Report    Procedure ID: WC  Procedure Date: 07/03/2024   Patient Name: Lev Faust YOB: 1950   Procedure Type: Routine Medical Record No: [id number]     INDICATION: Seizure    STATE:  Awake and sleep .    DESCRIPTION OF PROCEDURE: Electrodes were applied in accordance with the international 10-20 system of electrode placement.  EEG was reviewed in both bipolar and referential montages.    Description of Activity:  During wakefulness, there is continuous runs of 9-10 Hz symmetric posterior alpha rhythm, that attenuate symmetrically with eye opening. Low voltage beta activity occurs symmetrically at the anterior head regions bilaterally.    During drowsiness, there is attenuation of the alpha rhythm and low voltage theta activity occurs bilaterally. K complexes, vertex sharp waves and sleep spindles occur and are symmetric.     Intermittent photic stimulation was performed and induces bilaterally symmetric posterior driving responses.     No sharp or spike discharges, seizures or epileptiform discharges seen. No focal asymmetry.    Clinical Interpretation:  This EEG, performed during wakefulness and sleep is normal. There is no focal asymmetry, seizures or epileptiform discharges seen.       Medications:  Current Outpatient Medications   Medication Sig    amLODIPine (NORVASC) 10 MG tablet Take 1 tablet by mouth daily    phytonadione (VITAMIN K) 5 MG tablet Take 1 tablet by mouth once    MULTIPLE VITAMIN PO Take by mouth    ALPRAZolam (XANAX) 1 MG tablet Take 1 tablet by mouth 2 times daily as needed.    testosterone cypionate (DEPOTESTOTERONE CYPIONATE) 200 MG/ML injection INJECT 1 ML INTRAMUSCULARLY EVERY TWO WEEKS    B Complex Vitamins (VITAMIN B COMPLEX PO) Take 1 tablet by mouth daily    ascorbic acid (VITAMIN C) 500 MG tablet Take 1 tablet by mouth daily    vitamin D (CHOLECALCIFEROL) 25 MCG (1000 UT) TABS

## 2024-08-01 ENCOUNTER — TELEPHONE (OUTPATIENT)
Age: 74
End: 2024-08-01

## 2024-08-01 NOTE — TELEPHONE ENCOUNTER
Patient requesting a call. He stated Dr. Lowe told him the office would be contacting him with a referral a Neurological Specialist or they would make the appt and call him.

## 2024-08-02 ENCOUNTER — TELEPHONE (OUTPATIENT)
Age: 74
End: 2024-08-02

## 2024-08-02 NOTE — TELEPHONE ENCOUNTER
Spoke with patient,  verified, and informed that Dr. Griffin had referred for neuropsych testing and that someone should be calling to schedule an appointment.Patient stated it has been a month since the referral and needs to have done prior to going back to work. Informed patient will check with the neuropsych coordinator. Patient verbalized understanding.    Informed Hetal Restrepo,neuropsych coordinator and she will contact patient.

## 2024-08-20 ENCOUNTER — TELEPHONE (OUTPATIENT)
Age: 74
End: 2024-08-20

## 2024-08-20 DIAGNOSIS — G44.86 CERVICOGENIC HEADACHE: Primary | ICD-10-CM

## 2024-08-20 RX ORDER — AMITRIPTYLINE HYDROCHLORIDE 10 MG/1
TABLET, FILM COATED ORAL
Qty: 90 TABLET | Refills: 5 | Status: SHIPPED | OUTPATIENT
Start: 2024-08-20 | End: 2024-08-21 | Stop reason: SINTOL

## 2024-08-21 DIAGNOSIS — G44.86 CERVICOGENIC HEADACHE: Primary | ICD-10-CM

## 2024-08-21 RX ORDER — TOPIRAMATE 25 MG/1
TABLET ORAL
Qty: 60 TABLET | Refills: 5 | Status: SHIPPED | OUTPATIENT
Start: 2024-08-21

## 2024-08-30 ENCOUNTER — OFFICE VISIT (OUTPATIENT)
Age: 74
End: 2024-08-30
Payer: MEDICARE

## 2024-08-30 VITALS
DIASTOLIC BLOOD PRESSURE: 78 MMHG | RESPIRATION RATE: 18 BRPM | HEIGHT: 69 IN | TEMPERATURE: 98.5 F | BODY MASS INDEX: 21.3 KG/M2 | WEIGHT: 143.8 LBS | HEART RATE: 70 BPM | OXYGEN SATURATION: 96 % | SYSTOLIC BLOOD PRESSURE: 126 MMHG

## 2024-08-30 DIAGNOSIS — G43.719 CHRONIC MIGRAINE WITHOUT AURA, INTRACTABLE, WITHOUT STATUS MIGRAINOSUS: Primary | ICD-10-CM

## 2024-08-30 DIAGNOSIS — R41.3 MEMORY LOSS: ICD-10-CM

## 2024-08-30 DIAGNOSIS — Z86.73 HISTORY OF STROKE: ICD-10-CM

## 2024-08-30 DIAGNOSIS — R56.9 SEIZURE (HCC): ICD-10-CM

## 2024-08-30 PROCEDURE — 3017F COLORECTAL CA SCREEN DOC REV: CPT | Performed by: PSYCHIATRY & NEUROLOGY

## 2024-08-30 PROCEDURE — 1036F TOBACCO NON-USER: CPT | Performed by: PSYCHIATRY & NEUROLOGY

## 2024-08-30 PROCEDURE — G8427 DOCREV CUR MEDS BY ELIG CLIN: HCPCS | Performed by: PSYCHIATRY & NEUROLOGY

## 2024-08-30 PROCEDURE — 1123F ACP DISCUSS/DSCN MKR DOCD: CPT | Performed by: PSYCHIATRY & NEUROLOGY

## 2024-08-30 PROCEDURE — G8420 CALC BMI NORM PARAMETERS: HCPCS | Performed by: PSYCHIATRY & NEUROLOGY

## 2024-08-30 PROCEDURE — 99214 OFFICE O/P EST MOD 30 MIN: CPT | Performed by: PSYCHIATRY & NEUROLOGY

## 2024-08-30 RX ORDER — AMITRIPTYLINE HYDROCHLORIDE 10 MG/1
TABLET ORAL
COMMUNITY
End: 2024-08-30

## 2024-08-30 NOTE — PROGRESS NOTES
mentions issues with sleep.  Patient was prescribed trazodone and meclizine.  Advised to follow-up with neurology.    Since the last visit on 7/1/2024:  Patient underwent EEG 7/3/2024 which revealed normal awake and sleep patterns.  No seizures.  Patient reports no recurrence of any seizure-like activity.      Persistent issues with cognitive difficulties.  Feels like he hears his voice much louder and as if there is nothing inside his brain.  Has difficulty filling out forms.      Current issue of headaches  Condition ongoing for the past 3 years or so  Worse since 3/2024. It has been daily.  Sudden onset  Mid frontal or around the eye or bitemporal  Stabbing and sharp pain  At its worst a 10/10. Constantly a 7-8/10  Associated with dizziness  Worse headaches occur every 3 day or 10 or more  Takes extra strength Tylenol or Advil - takes the edge off    Medications previously tried for headache:  Amitriptyline, topiramate, ibuprofen, Advil, Aleve, Tylenol, aspirin, Tylenol No. 3, naproxen, Cafergot, Fiorinal, Percocet, Darvon, Vicodin, oxycodone, Darvocet, codeine, metoprolol, Soma, Zoloft, magnesium, antibiotics, Benadryl, prednisone, Xanax, melatonin, paroxetine, Wellbutrin    Outside reports reviewed: EEG    Review of Systems:    A comprehensive review of systems was performed:   Constitutional: positive for fatigue  Eyes: positive for vision problems  Ears, nose, mouth, throat, and face: positive for hearing problem  Respiratory: positive for none  Cardiovascular: positive for high blood pressure  Gastrointestinal: positive for none  Genitourinary: positive for none  Integument/breast: positive for none  Hematologic/lymphatic: positive for none  Musculoskeletal: positive for muscle pain, joint pain  Neurological: positive for headaches, memory loss, falls  Behavioral/Psych: positive for anxiety  Endocrine: positive for none  Allergic/Immunologic: positive for none    Objective:     /78   Pulse 70   Temp  98.5 °F (36.9 °C) (Temporal)   Resp 18   Ht 1.753 m (5' 9\")   Wt 65.2 kg (143 lb 12.8 oz)   SpO2 96%   BMI 21.24 kg/m²     PHYSICAL EXAM:    NEUROLOGICAL EXAM:    Appearance:  The patient is well developed, well nourished, provides a coherent history and is in no acute distress.   Mental Status: Oriented to time, place and person. Fluent, no aphasia or dysarthria. Mood and affect appropriate.   Cranial Nerves:   II - XII were intact.   Motor:  5/5 strengt. Normal bulk and tone. No pronator drift.    Reflexes:   Deep tendon reflexes were symmetrical.   Sensory:   Intact.   Gait:  Steady gait. No Romberg.    Tremor:   No tremor noted.   Cerebellar:  Intact FTN/FREDA/HTS.         Anterior head posture with shoulders rotated      Assessment:      Diagnosis Orders   1. Chronic migraine without aura, intractable, without status migrainosus  [facility] - Referral for Botox    Onabotulinumtoxin A (BOTOX) 200 units injection      2. Seizure (HCC)        3. Memory loss        4. History of stroke                Plan:   Neurological examination is nonfocal.  Worse headaches consistent with migraines.  Currently intractable daily with exacerbations.  Failed multiple maintenance and abortive therapies.  Trial of chemodenervation with Botox 155 units every 3 months using a chronic migraine protocol.  Authorization to be obtained.  Prescription was ordered.    No recurrence of seizures.  Seizure while in the hospital was again a provoked event.  Likely secondary to abrupt cessation of clonazepam and olanzapine as well as use of THC.  Previous Head CT without contrast did not reveal any acute process.  Previous brain MRI without contrast did not reveal any acute process.  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  Previous head and neck CTA did not reveal any flow-limiting stenosis or aneurysm.  No ICA stenosis.  Previous EEG done revealed normal awake and sleep patterns.  Repeat EEG was again

## 2024-09-09 ENCOUNTER — TELEPHONE (OUTPATIENT)
Age: 74
End: 2024-09-09

## 2024-10-09 ENCOUNTER — PROCEDURE VISIT (OUTPATIENT)
Age: 74
End: 2024-10-09

## 2024-10-09 DIAGNOSIS — G43.719 CHRONIC MIGRAINE WITHOUT AURA, INTRACTABLE, WITHOUT STATUS MIGRAINOSUS: Primary | ICD-10-CM

## 2024-10-09 NOTE — PROGRESS NOTES
[facility] [place]  OFFICE PROCEDURE NOTE        Chart reviewed for the following:   Gregorio HUTCHINS Jr, MD, have reviewed the History, Physical and updated the Allergic reactions for Lev Faust     TIME OUT performed immediately prior to start of procedure:   Gregorio HUTCHINS Jr, MD, have performed the following reviews on Lev Faust prior to the start of the procedure:            * Patient was identified by name and date of birth   * Agreement on procedure being performed was verified  * Risks and Benefits explained to the patient  * Procedure site verified and marked as necessary  * Patient was positioned for comfort  * Consent was signed and verified     Time: 1040  Date of procedure: 10/9/2024  Procedure performed by:  Dr. Gregorio Griffin  Provider assisted by: None  Patient assisted by: none  How tolerated by patient: well  Comments: None    Botox Injection Note    Indication:   Patient has chronic migraine, and has tried/ failed multiple headache preventive medications (see clinic notes for details). He presents for initial Botox Injection to reduce overall headache frequency, including migraines. Currently having daily headaches.        Procedure:   Botox concentration: 200 units in 4 ml of preservative-free normal saline.   31 sites injections, distribution as follow        Units/site Sites Sides Subtotal    Procerus 5 1 1 5    5 1 2 10   Frontalis 5 2 2 20   Temporalis 5 4 2 40   Occipitalis 5 3 2 30   Upper cervical paraspinalis 5 2 2 20   Trapezius 5 3 2 30          200 units Botox were reconstituted, 155 units injected as above with 45 units of wastage.     Lot. No.  [id number]  Exp. Date  12/2026     Patient tolerated procedure well.     RTC in 1 month

## 2024-11-04 ENCOUNTER — TELEPHONE (OUTPATIENT)
Age: 74
End: 2024-11-04

## 2024-11-04 NOTE — TELEPHONE ENCOUNTER
Botox Drug Acquisition: BUY AND BILL  Drug: BOTOX 200 units Dx: G43.719  Insurance: [employer]  Submission Type: [employer] Automated renewal system  [facility]:   CPT: 52748  Reference #: [id number]  Approval Range: 01/01/25 to 12/31/25    Scanned approval letter to media

## 2024-11-12 ENCOUNTER — OFFICE VISIT (OUTPATIENT)
Age: 74
End: 2024-11-12
Payer: MEDICARE

## 2024-11-12 VITALS
RESPIRATION RATE: 18 BRPM | DIASTOLIC BLOOD PRESSURE: 82 MMHG | HEIGHT: 69 IN | WEIGHT: 142 LBS | SYSTOLIC BLOOD PRESSURE: 120 MMHG | HEART RATE: 68 BPM | BODY MASS INDEX: 21.03 KG/M2 | OXYGEN SATURATION: 98 %

## 2024-11-12 DIAGNOSIS — G43.719 CHRONIC MIGRAINE WITHOUT AURA, INTRACTABLE, WITHOUT STATUS MIGRAINOSUS: Primary | ICD-10-CM

## 2024-11-12 DIAGNOSIS — R41.3 MEMORY LOSS: ICD-10-CM

## 2024-11-12 DIAGNOSIS — R56.9 SEIZURE (HCC): ICD-10-CM

## 2024-11-12 DIAGNOSIS — Z86.73 HISTORY OF STROKE: ICD-10-CM

## 2024-11-12 PROCEDURE — 3017F COLORECTAL CA SCREEN DOC REV: CPT | Performed by: PSYCHIATRY & NEUROLOGY

## 2024-11-12 PROCEDURE — 1126F AMNT PAIN NOTED NONE PRSNT: CPT | Performed by: PSYCHIATRY & NEUROLOGY

## 2024-11-12 PROCEDURE — 1123F ACP DISCUSS/DSCN MKR DOCD: CPT | Performed by: PSYCHIATRY & NEUROLOGY

## 2024-11-12 PROCEDURE — G8484 FLU IMMUNIZE NO ADMIN: HCPCS | Performed by: PSYCHIATRY & NEUROLOGY

## 2024-11-12 PROCEDURE — 99214 OFFICE O/P EST MOD 30 MIN: CPT | Performed by: PSYCHIATRY & NEUROLOGY

## 2024-11-12 PROCEDURE — 1159F MED LIST DOCD IN RCRD: CPT | Performed by: PSYCHIATRY & NEUROLOGY

## 2024-11-12 PROCEDURE — 1036F TOBACCO NON-USER: CPT | Performed by: PSYCHIATRY & NEUROLOGY

## 2024-11-12 PROCEDURE — G8427 DOCREV CUR MEDS BY ELIG CLIN: HCPCS | Performed by: PSYCHIATRY & NEUROLOGY

## 2024-11-12 PROCEDURE — G8420 CALC BMI NORM PARAMETERS: HCPCS | Performed by: PSYCHIATRY & NEUROLOGY

## 2024-11-12 NOTE — PROGRESS NOTES
Room:  I have reviewed all needed documentation in preparation for visit. Verified patient by name and date of birth  Chief Complaint   Patient presents with    Follow-up     Botox-having migraines twice a day       Vitals:    11/12/24 0924   BP: 120/82   Pulse: 68   Resp: 18   SpO2: 98%   Weight: 64.4 kg (142 lb)   Height: 1.753 m (5' 9\")        Health Maintenance Due   Topic Date Due    Pneumococcal 65+ years Vaccine (1 of 2 - PCV) Never done    Hepatitis A vaccine (1 of 2 - Risk 2-dose series) Never done    DTaP/Tdap/Td vaccine (1 - Tdap) Never done    Shingles vaccine (1 of 2) Never done    Hepatitis B vaccine (1 of 3 - Risk 3-dose series) Never done    Respiratory Syncytial Virus (RSV) Pregnant or age 60 yrs+ (1 - 1-dose 60+ series) Never done    Annual Wellness Visit (Medicare)  Never done    Flu vaccine (1) Never done    COVID-19 Vaccine (1 - 2023-24 season) Never done        1. \"Have you been to the ER, urgent care clinic since your last visit?  Hospitalized since your last visit?\" No     2. \"Have you seen or consulted any other health care providers outside of the [facility] since your last visit?\" No

## 2024-11-12 NOTE — PROGRESS NOTES
NEUROLOGY CLINIC NOTE    Patient ID:  Lev Faust  [id number]  74 y.o.  1950    Date of Visit:  November 12, 2024    Reason for Visit:  headaches    Chief Complaint   Patient presents with    Follow-up     Botox-having migraines twice a day       History of Present Illness:     Patient Active Problem List    Diagnosis Date Noted    Elevated lactic acid level 03/29/2024    Hepatitis C virus infection cured after antiviral drug therapy 02/20/2024    Seizure (HCC) 03/29/2023    Hypokalemia 03/29/2023    Leukocytosis 03/29/2023    Rib fracture 03/29/2023    Hyperglycemia 03/29/2023    Hypercalcemia 03/29/2023     Past Medical History:   Diagnosis Date    Diverticulitis 2018    Erectile dysfunction 2020    Headache     Often    Hearing loss 2019    worse every day    Hepatitis C 1992    Hypertension     Pancreatitis 2016    Seizures (HCC) 3/31/2023    1 as a result of a concussion    Sleep apnea 1980      Past Surgical History:   Procedure Laterality Date    COLONOSCOPY N/A 2/23/2018    COLONOSCOPY performed by Kenji Sosa MD at [facility] ENDOSCOPY    COLONOSCOPY N/A 05/10/2023    COLONOSCOPY performed by Anatoly Spann MD at [facility] ENDOSCOPY    SMALL INTESTINE SURGERY  2018    Divertriiculits    TONSILLECTOMY  1955      Current Outpatient Medications on File Prior to Visit   Medication Sig Dispense Refill    Onabotulinumtoxin A (BOTOX) 200 units injection Inject 200 units by IM route to 31 FDA approved sites in head and neck every 12 weeks 1 each 3    amLODIPine (NORVASC) 10 MG tablet Take 1 tablet by mouth daily      phytonadione (VITAMIN K) 5 MG tablet Take 1 tablet by mouth once      MULTIPLE VITAMIN PO Take by mouth      ALPRAZolam (XANAX) 1 MG tablet Take 1 tablet by mouth 2 times daily as needed.      testosterone cypionate (DEPOTESTOTERONE CYPIONATE) 200 MG/ML injection INJECT 1 ML INTRAMUSCULARLY EVERY TWO WEEKS      B Complex Vitamins (VITAMIN B COMPLEX PO) Take 1 tablet by mouth daily      ascorbic acid

## 2024-11-26 ENCOUNTER — TELEPHONE (OUTPATIENT)
Age: 74
End: 2024-11-26

## 2024-11-26 NOTE — TELEPHONE ENCOUNTER
Called patient,  verified, and appt for Botox has been scheduled for 2025 at 3:30pm and need to arrive by 3:15pm. Patient accepted appt and verbalized understanding.

## 2025-01-03 ENCOUNTER — TELEPHONE (OUTPATIENT)
Age: 75
End: 2025-01-03

## 2025-01-03 NOTE — TELEPHONE ENCOUNTER
Called patient LM will have to call insurance regarding copay and being a new year may have to go towards deductible. Call back if any questions.

## 2025-01-03 NOTE — TELEPHONE ENCOUNTER
Patient called on 1/3/25 requesting a call to cancel his appt for 1/10/25    He stated the Co-pay is to high.    He's requesting the nurse to contact him to discuss this.

## 2025-04-02 ENCOUNTER — TELEPHONE (OUTPATIENT)
Age: 75
End: 2025-04-02

## 2025-04-08 ENCOUNTER — TELEPHONE (OUTPATIENT)
Age: 75
End: 2025-04-08

## 2025-04-09 NOTE — TELEPHONE ENCOUNTER
Spoke with patient,  verified, and have scheduled Botox appt for 2025 at 11am and arrive by 10:45am at the [place] location. Patient accepted appt.

## 2025-04-23 ENCOUNTER — PROCEDURE VISIT (OUTPATIENT)
Age: 75
End: 2025-04-23
Payer: MEDICARE

## 2025-04-23 DIAGNOSIS — G43.719 CHRONIC MIGRAINE WITHOUT AURA, INTRACTABLE, WITHOUT STATUS MIGRAINOSUS: Primary | ICD-10-CM

## 2025-04-23 PROCEDURE — 64615 CHEMODENERV MUSC MIGRAINE: CPT | Performed by: PSYCHIATRY & NEUROLOGY

## 2025-04-23 NOTE — PROGRESS NOTES
[facility] [place]  OFFICE PROCEDURE NOTE        Chart reviewed for the following:   Gregorio HUTCHINS Jr, MD, have reviewed the History, Physical and updated the Allergic reactions for Lev Faust     TIME OUT performed immediately prior to start of procedure:   Gregorio HUTCHINS Jr, MD, have performed the following reviews on Lev Faust prior to the start of the procedure:            * Patient was identified by name and date of birth   * Agreement on procedure being performed was verified  * Risks and Benefits explained to the patient  * Procedure site verified and marked as necessary  * Patient was positioned for comfort  * Consent was signed and verified     Time: 1110  Date of procedure: 4/23/2025  Procedure performed by:  Dr. Gregorio Griffin  Provider assisted by: None  Patient assisted by: none  How tolerated by patient: well  Comments: None    Botox Injection Note    Indication:   Patient has chronic migraine, and has tried/ failed multiple headache preventive medications (see clinic notes for details). He presents for repeat Botox Injection to reduce overall headache frequency, including migraines. Currently having 4 to 5 migraine headaches a month.        Procedure:   Botox concentration: 200 units in 4 ml of preservative-free normal saline.   31 sites injections, distribution as follow        Units/site Sites Sides Subtotal    Procerus 5 1 1 5    5 1 2 10   Frontalis 5 2 2 20   Temporalis 5 4 2 40   Occipitalis 5 3 2 30   Upper cervical paraspinalis 5 2 2 20   Trapezius 5 3 2 30          200 units Botox were reconstituted, 155 units injected as above with 45 units of wastage.     Lot. No.  [id number]  Exp. Date  04/2027     Patient tolerated procedure well.     RTC in 3 months

## 2025-06-10 ENCOUNTER — TRANSCRIBE ORDERS (OUTPATIENT)
Facility: HOSPITAL | Age: 75
End: 2025-06-10

## 2025-06-10 DIAGNOSIS — R06.09 DYSPNEA ON EXERTION: ICD-10-CM

## 2025-06-10 DIAGNOSIS — I10 HYPERTENSION, UNSPECIFIED TYPE: Primary | ICD-10-CM

## 2025-06-10 DIAGNOSIS — G44.52 NEW DAILY PERSISTENT HEADACHE: ICD-10-CM

## 2025-06-11 ENCOUNTER — HOSPITAL ENCOUNTER (OUTPATIENT)
Facility: HOSPITAL | Age: 75
Discharge: HOME | End: 2025-06-14
Payer: MEDICARE

## 2025-06-11 DIAGNOSIS — R06.09 DYSPNEA ON EXERTION: ICD-10-CM

## 2025-06-11 DIAGNOSIS — G44.52 NEW DAILY PERSISTENT HEADACHE: ICD-10-CM

## 2025-06-11 DIAGNOSIS — I10 HYPERTENSION, UNSPECIFIED TYPE: ICD-10-CM

## 2025-06-11 PROCEDURE — A9579 GAD-BASE MR CONTRAST NOS,1ML: HCPCS | Performed by: FAMILY MEDICINE

## 2025-06-11 PROCEDURE — 70553 MRI BRAIN STEM W/O & W/DYE: CPT

## 2025-06-11 PROCEDURE — 6360000004 HC RX CONTRAST MEDICATION: Performed by: FAMILY MEDICINE

## 2025-06-11 RX ORDER — GADOTERIDOL 279.3 MG/ML
14 INJECTION INTRAVENOUS
Status: COMPLETED | OUTPATIENT
Start: 2025-06-11 | End: 2025-06-11

## 2025-06-11 RX ADMIN — GADOTERIDOL 14 ML: 279.3 INJECTION, SOLUTION INTRAVENOUS at 08:25

## 2025-06-18 ENCOUNTER — TRANSCRIBE ORDERS (OUTPATIENT)
Facility: HOSPITAL | Age: 75
End: 2025-06-18

## 2025-06-18 DIAGNOSIS — E21.0 PRIMARY HYPERPARATHYROIDISM: Primary | ICD-10-CM

## 2025-06-27 NOTE — PROGRESS NOTES
INTAKE NOTE     Name: Lev Faust MRN: [id number]   Age: 75 y.o.  YOB: 1950   Gender: male Date of Intake: 6/30/2025   Race/Ethnicity: White (non-)     Education: Specialized/technical school     Handedness: Right Referral Source: Gregorio Griffin Jr., MD (Neurology)     EVALUATION METHODS: The following information was gathered from a clinical interview with Mr. Faust and a review of available medical records. This evaluation was conducted for clinical treatment planning and may not be valid for other purposes. Potential risks and benefits, limits of confidentiality, and evaluation procedures were discussed. Mr. Faust expressed an understanding of the purpose of the visit and consented to the procedures.     REASON FOR REFERRAL: Mr. Faust was referred by his neurologist to evaluate cognitive functioning and rule out impairment in the context of a history of seizures (two hospitalizations, one for each occurrence), stroke per neuroimaging, and migraines who presented with worsening cognitive issues. Neurologist discussed with him at his most recent visit that white matter changes may cause difficulties with memory, possible chronic THC use as well. Goal of evaluation is to rule out etiological factors. Per records, \"This will also help in determining whether patient can still do gainful employment.\" Information gathered today will assist in differential diagnosis and treatment planning/recommendations.     PRESENTING CONCERNS     Cognitive Functioning: Onset of cognitive difficulties reportedly began after a hospitalization in 2023 involving a fall, altered mental status, and witnessed seizure. Since then, he reported to persistent issues with short-term memory. He noted that his mind will suddenly think about needing to do something and once he gets to where he is going, he will suddenly forget what it is he needs to do. Details of information that he has known about someone

## 2025-06-30 ENCOUNTER — OFFICE VISIT (OUTPATIENT)
Age: 75
End: 2025-06-30

## 2025-06-30 DIAGNOSIS — R41.3 SHORT-TERM MEMORY LOSS: Primary | ICD-10-CM

## 2025-06-30 DIAGNOSIS — Z86.73 HISTORY OF STROKE: ICD-10-CM

## 2025-06-30 DIAGNOSIS — F41.9 ANXIETY: ICD-10-CM

## 2025-06-30 DIAGNOSIS — Z87.898 HISTORY OF SEIZURES: ICD-10-CM

## 2025-06-30 NOTE — PATIENT INSTRUCTIONS
Thank you for choosing us for your neuropsychological evaluation. We will examine your overall cognitive functioning, including areas such as memory, attention, concentration, language, and problem solving.   You were seen for an initial visit by Dr. Yue Kasper (Neuropsychologist) today. Please refer to the dates listed under \"What's Next\" for your scheduled Procedure (testing) and Follow-Up appointments.    Reminders for your testing appointment:   If you wear glasses/contacts and/or hearing assistive devices, please be sure to bring them with you to your testing appointment.   You will be working with my Psychometrist, Alicia, on the day of testing. She is specially trained to administer these cognitive tests to you.   Please prepare accordingly, as the duration of testing may take a few hours to complete.     To reschedule or cancel your appointment, please call [phone].   In case of an emergency, call [phone] or report to the nearest emergency department.  It has been our pleasure to work with you, and we look forward to speaking with you soon.

## 2025-07-14 ENCOUNTER — PROCEDURE VISIT (OUTPATIENT)
Age: 75
End: 2025-07-14
Payer: MEDICARE

## 2025-07-14 DIAGNOSIS — G31.84 MILD NEUROCOGNITIVE DISORDER: Primary | ICD-10-CM

## 2025-07-14 PROCEDURE — 96132 NRPSYC TST EVAL PHYS/QHP 1ST: CPT | Performed by: STUDENT IN AN ORGANIZED HEALTH CARE EDUCATION/TRAINING PROGRAM

## 2025-07-16 ENCOUNTER — TELEPHONE (OUTPATIENT)
Age: 75
End: 2025-07-16

## 2025-07-16 NOTE — TELEPHONE ENCOUNTER
Patient came into [place] his appointment with Dr. Griffin for today 7/16.  He stated that the address from his notification was [address] [place] [address], not [address] [facility].  Called over to  office and let them know he would like a call to reschedule the appointment.  He thanked me for helping him.

## 2025-07-23 ENCOUNTER — PROCEDURE VISIT (OUTPATIENT)
Age: 75
End: 2025-07-23
Payer: MEDICARE

## 2025-07-23 DIAGNOSIS — G43.719 CHRONIC MIGRAINE WITHOUT AURA, INTRACTABLE, WITHOUT STATUS MIGRAINOSUS: Primary | ICD-10-CM

## 2025-07-23 PROCEDURE — 64615 CHEMODENERV MUSC MIGRAINE: CPT | Performed by: PSYCHIATRY & NEUROLOGY

## 2025-07-23 NOTE — PROGRESS NOTES
[facility]UROLOGY [facility]INIC [place]  OFFICE PROCEDURE NOTE        Chart reviewed for the following:   Gregorio HUTCHINS Jr, MD, have reviewed the History, Physical and updated the Allergic reactions for Lev Faust     TIME OUT performed immediately prior to start of procedure:   Gregorio HUTCHINS Jr, MD, have performed the following reviews on Lev Faust prior to the start of the procedure:            * Patient was identified by name and date of birth   * Agreement on procedure being performed was verified  * Risks and Benefits explained to the patient  * Procedure site verified and marked as necessary  * Patient was positioned for comfort  * Consent was signed and verified     Time: 0910  Date of procedure: 7/23/2025  Procedure performed by:  Dr. Gregorio Griffin  Provider assisted by: None  Patient assisted by: none  How tolerated by patient: well  Comments: None    Botox Injection Note    Indication:   Patient has chronic migraine, and has tried/ failed multiple headache preventive medications (see clinic notes for details). He presents for repeat Botox Injection to reduce overall headache frequency, including migraines. Currently having 4 to 5 migraine headaches a month.        Procedure:   Botox concentration: 200 units in 4 ml of preservative-free normal saline.   31 sites injections, distribution as follow        Units/site Sites Sides Subtotal    Procerus 5 1 1 5    5 1 2 10   Frontalis 5 2 2 20   Temporalis 5 4 2 40   Occipitalis 5 3 2 30   Upper cervical paraspinalis 5 2 2 20   Trapezius 5 3 2 30          200 units Botox were reconstituted, 155 units injected as above with 45 units of wastage.     Lot. No.  [id number]  Exp. Date  10/2027     Patient tolerated procedure well.     RTC in 3 months

## 2025-07-28 ENCOUNTER — OFFICE VISIT (OUTPATIENT)
Age: 75
End: 2025-07-28
Payer: MEDICARE

## 2025-07-28 DIAGNOSIS — G31.84 MILD NEUROCOGNITIVE DISORDER: Primary | ICD-10-CM

## 2025-07-28 PROCEDURE — 96132 NRPSYC TST EVAL PHYS/QHP 1ST: CPT | Performed by: STUDENT IN AN ORGANIZED HEALTH CARE EDUCATION/TRAINING PROGRAM

## 2025-07-28 NOTE — PROGRESS NOTES
NEUROPSYCHOLOGICAL EVALUATION   Feedback     Prior to seeing the patient for today's visit, I reviewed pertinent records, including the previously completed report, the records in Epic, and any updated visits from other providers since the patient's last visit. Mr. Lev Faust was seen for a feedback appointment via in-person to discuss the results of his neuropsychological evaluation.     DISCUSSION OF RESULTS AND RECOMMENDATIONS:   I provided feedback about Mr. Faust's neuropsychological testing results in detail:   Cognitive diagnosis: Mild neurocognitive disorder, etiology appears less neurodegenerative at this time.   Contributing factors: Complex neurological history (e.g., seizures, migraine, incidental stroke finding), moderate anxiety, continued THC use.   Mr. Faust expressed agreement with the results and that the findings were consistent with what is noticed in daily life.     Recommendations from the report were discussed in detail. He expressed understanding. The patient and/or their care partner(s) will:   Follow-up with neurologist in light of these results and for continuity of care.   Cognitive baseline, return for re-eval if further progression of cognitive symptoms.   Patient does not intend to discontinue THC use after I provided an explanation of the cognitive impact of THC and possible alternative medications that can help anxiety instead.   Patient also not agreeable to talk therapy despite discussion of how this can benefit him in changing negative thought patterns which he expressed today.   No concern from a cognitive standpoint about his ability to continue working.     I answered any questions in detail, reminded him that a copy of his report could be accessed via mAPPn, and encouraged him and his family to contact us with any further questions or concerns moving forward.     MENTAL STATUS:  Arrival: On time and unaccompanied.  General appearance: Appropriately dressed and

## (undated) DEVICE — OPEN-END URETERAL CATHETER: Brand: COOK

## (undated) DEVICE — SKIN MARKER FINE TIP WITH RULER: Brand: DEVON

## (undated) DEVICE — POOLE SUCTION INSTRUMENT WITH REMOVABLE SHEATH: Brand: POOLE

## (undated) DEVICE — SURGICAL PROCEDURE PACK TISS 3X5 IN ABSORBABLE SEPRAFILM

## (undated) DEVICE — CONTAINER SPEC 20 ML LID NEUT BUFF FORMALIN 10 % POLYPR STS

## (undated) DEVICE — Z DISCONTINUEDSOLUTION PREP 2OZ 10% POVIDONE IOD SCR CAP BTL

## (undated) DEVICE — SUTURE PROL SZ 0 L30IN NONABSORBABLE BLU SH L26MM 1/2 CIR 8834H

## (undated) DEVICE — ADULT SPO2 SENSOR: Brand: NELLCOR

## (undated) DEVICE — DEVICE TRNSF SPIK STL 2008S] MICROTEK MEDICAL INC]

## (undated) DEVICE — TOWEL SURG W17XL27IN STD BLU COT NONFENESTRATED PREWASHED

## (undated) DEVICE — TRAP SUC MUCOUS 70ML -- MEDICHOICE MEDLINE

## (undated) DEVICE — INFECTION CONTROL KIT SYS

## (undated) DEVICE — BLADELESS OPTICAL TROCAR WITH FIXATION CANNULA: Brand: VERSAPORT

## (undated) DEVICE — SOLUTION IRRIG 3000ML 0.9% SOD CHL FLX CONT 0797208] ICU MEDICAL INC]

## (undated) DEVICE — CUFF RMFG BP INF SZ 11 DISP -- LAWSON OEM ITEM 238915

## (undated) DEVICE — ROCKER SWITCH PENCIL BLADE ELECTRODE, HOLSTER: Brand: EDGE

## (undated) DEVICE — TRAY CATH 16FR BLLN 5CC DRNGE BG 2000ML SIL F ANTIREFLX

## (undated) DEVICE — STERILE POLYISOPRENE POWDER-FREE SURGICAL GLOVES: Brand: PROTEXIS

## (undated) DEVICE — TRAY PREP DRY W/ PREM GLV 2 APPL 6 SPNG 2 UNDPD 1 OVERWRAP

## (undated) DEVICE — MARYLAND JAW LAPAROSCOPIC SEALER/DIVIDER COATED: Brand: LIGASURE

## (undated) DEVICE — BULB SYRINGE, IRRIGATION WITH PROTECTIVE CAP, 60 CC, INDIVIDUALLY WRAPPED: Brand: DOVER

## (undated) DEVICE — CIRCULAR STAPLER WITH DST SERIES TECHNOLOGY: Brand: EEA

## (undated) DEVICE — ABDOMINAL LITHOTOMY PACK: Brand: CONVERTORS

## (undated) DEVICE — SET GRAV CK VLV NEEDLESS ST 3 GANGED 4WAY STPCOCK HI FLO 10

## (undated) DEVICE — BAG BELONG PT PERS CLEAR HANDL

## (undated) DEVICE — REM POLYHESIVE ADULT PATIENT RETURN ELECTRODE: Brand: VALLEYLAB

## (undated) DEVICE — SOLUTION IV 1000ML 0.9% SOD CHL

## (undated) DEVICE — ACCESS PLATFORM FOR MINIMALLY INVASIVE SURGERY.: Brand: GELPORT® LAPAROSCOPIC  SYSTEM

## (undated) DEVICE — UNIVERSAL FIXATION CANNULA: Brand: VERSAONE

## (undated) DEVICE — 3000CC GUARDIAN II: Brand: GUARDIAN

## (undated) DEVICE — 3M™ CUROS™ DISINFECTING CAP FOR NEEDLELESS CONNECTORS 270/CARTON 20 CARTONS/CASE CFF1-270: Brand: CUROS™

## (undated) DEVICE — CANN NASAL O2 CAPNOGRAPHY AD -- FILTERLINE

## (undated) DEVICE — TRAY CATH OD16FR SIL URIN M STATLOK STBL DEV SURSTP

## (undated) DEVICE — OVERLAY MATRS H2IN FOAM CONVOLUTED STD DENS

## (undated) DEVICE — BAG SPEC BIOHZRD 10 X 10 IN --

## (undated) DEVICE — TIP SUCT BLU PLAS BLB W/O CTRL VENT YANK

## (undated) DEVICE — SYR 10ML LUER LOK 1/5ML GRAD --

## (undated) DEVICE — BAG COLLECTION FLD OR-TBL NS --

## (undated) DEVICE — CONTAINER,SPECIMEN,3OZ,OR STRL: Brand: MEDLINE

## (undated) DEVICE — SOLUTION IRRIGATION H2O 0797305] ICU MEDICAL INC]

## (undated) DEVICE — CATH NEPHSTMY 4 WNG MCOT 24FR -- LTX

## (undated) DEVICE — UNIVERSAL STAPLER: Brand: ENDO GIA ULTRA

## (undated) DEVICE — YANKAUER OPEN TIP, NO VENT: Brand: ARGYLE

## (undated) DEVICE — VISUALIZATION SYSTEM: Brand: CLEARIFY

## (undated) DEVICE — Device

## (undated) DEVICE — DRAPE FLD WRM W44XL66IN C6L FOR INTRATEMP SYS THERMABASIN

## (undated) DEVICE — CLICKLINE SCISSORS INSERT: Brand: CLICKLINE

## (undated) DEVICE — BLADE ELECTRODE: Brand: EDGE

## (undated) DEVICE — 3M™ DURAPORE™ SURGICAL TAPE 1538-0, 1/2 INCH X 10 YARD (1,25CM X 9,1M), 24 ROLLS/BOX: Brand: 3M™ DURAPORE™

## (undated) DEVICE — FORCEPS BX L240CM JAW DIA2.8MM L CAP W/ NDL MIC MESH TOOTH

## (undated) DEVICE — ARTICULATION RELOAD WITH TRI-STAPLE TECHNOLOGY: Brand: ENDO GIA

## (undated) DEVICE — DEVON™ KNEE AND BODY STRAP 60" X 3" (1.5 M X 7.6 CM): Brand: DEVON

## (undated) DEVICE — SUTURE PDS II SZ 1 L36IN ABSRB VLT CT L40MM 1/2 CIR TAPR Z359T

## (undated) DEVICE — (D)PREP SKN CHLRAPRP APPL 26ML -- CONVERT TO ITEM 371833

## (undated) DEVICE — 1200 GUARD II KIT W/5MM TUBE W/O VAC TUBE: Brand: GUARDIAN

## (undated) DEVICE — CYSTOSCOPY PACK: Brand: CONVERTORS

## (undated) DEVICE — SNARE ENDOSCP POLYP MED STD AD 2.4X27X240 CM 2.8 MM OVL SENS

## (undated) DEVICE — CYSTO/BLADDER IRRIGATION SET, REGULATING CLAMP

## (undated) DEVICE — BLUNT TIP TROCAR: Brand: AUTO SUTURE

## (undated) DEVICE — SOLIDIFIER MEDC 1200ML -- CONVERT TO 356117

## (undated) DEVICE — GOWN,SIRUS,FABRNF,XL,20/CS: Brand: MEDLINE

## (undated) DEVICE — SNARE ENDOSCP M L240CM W27MM SHTH DIA2.4MM CHN 2.8MM OVL

## (undated) DEVICE — TUBING INSUFLTN 10FT LUER -- CONVERT TO ITEM 368568

## (undated) DEVICE — JELLY,LUBE,STERILE,FLIP TOP,TUBE,4-OZ: Brand: MEDLINE

## (undated) DEVICE — KENDALL SCD EXPRESS SLEEVES, KNEE LENGTH, MEDIUM: Brand: KENDALL SCD

## (undated) DEVICE — 3M™ MEDIPORE™ H SOFT CLOTH TAPE SHORT ROLL TAPE 6INCHES X 2 YARDS 16 ROLLS/CASE 2866S: Brand: 3M™ MEDIPORE™

## (undated) DEVICE — DERMABOND SKIN ADH 0.7ML -- DERMABOND ADVANCED 12/BX

## (undated) DEVICE — MEDI-VAC NON-CONDUCTIVE SUCTION TUBING: Brand: CARDINAL HEALTH

## (undated) DEVICE — HYDROPHILIC COATED RED RUBBER URETHRAL CATHETER, SMOOTH ROUNDED TIP, 20 FR (6.7 MM): Brand: DOVER

## (undated) DEVICE — SURGICAL PROCEDURE KIT GEN LAPAROSCOPY LF

## (undated) DEVICE — STAPLER SKIN 35CT WD STRL DISP -- MULTIFIRE PREMIUM

## (undated) DEVICE — SPONGE LAP 18X18IN STRL -- 5/PK

## (undated) DEVICE — KENDALL RADIOLUCENT FOAM MONITORING ELECTRODE -RECTANGULAR SHAPE: Brand: KENDALL

## (undated) DEVICE — SUTURE MCRYL SZ 4-0 L27IN ABSRB UD L19MM PS-2 1/2 CIR PRIM Y426H

## (undated) DEVICE — BINDER ABD S/M 12X30-45IN --

## (undated) DEVICE — SIMPLICITY FLUFF UNDERPAD 23X36, MODERATE: Brand: SIMPLICITY

## (undated) DEVICE — 3M™ IOBAN™ 2 ANTIMICROBIAL INCISE DRAPE 6650EZ: Brand: IOBAN™ 2

## (undated) DEVICE — COVER LT HNDL BLU PLAS

## (undated) DEVICE — STERILE-Z MAYO STAND COVERS CLEAR POLYETHYLENE STERILE UNIVERSAL FIT 20 PER CASE: Brand: STERILE-Z

## (undated) DEVICE — COLON CLOSING PACK: Brand: MEDLINE INDUSTRIES, INC.

## (undated) DEVICE — DRAPE,REIN 53X77,STERILE: Brand: MEDLINE

## (undated) DEVICE — GUIDEWIRE ENDOSCP L150CM DIA0.035IN TIP L15CM BENT PTFE

## (undated) DEVICE — CATH IV AUTOGRD BC PNK 20GA 25 -- INSYTE

## (undated) DEVICE — AMD ANTIMICROBIAL DRAIN SPONGES, 6 PLY, 0.2% POLYHEXAMETHYLENE BIGUANIDE HCI (PHMB): Brand: EXCILON

## (undated) DEVICE — KIT COLON W/ 1.1OZ LUB AND 2 END

## (undated) DEVICE — NEEDLE HYPO 22GA L1.5IN BLK S STL HUB POLYPR SHLD REG BVL

## (undated) DEVICE — TELFA ADHESIVE ISLAND DRESSING: Brand: TELFA

## (undated) DEVICE — STERILE POLYISOPRENE POWDER-FREE SURGICAL GLOVES WITH EMOLLIENT COATING: Brand: PROTEXIS